# Patient Record
Sex: MALE | Race: BLACK OR AFRICAN AMERICAN | HISPANIC OR LATINO | Employment: FULL TIME | URBAN - METROPOLITAN AREA
[De-identification: names, ages, dates, MRNs, and addresses within clinical notes are randomized per-mention and may not be internally consistent; named-entity substitution may affect disease eponyms.]

---

## 2020-09-19 ENCOUNTER — HOSPITAL ENCOUNTER (EMERGENCY)
Facility: HOSPITAL | Age: 55
Discharge: HOME | End: 2020-09-19
Attending: EMERGENCY MEDICINE | Admitting: EMERGENCY MEDICINE
Payer: COMMERCIAL

## 2020-09-19 VITALS
WEIGHT: 214.1 LBS | DIASTOLIC BLOOD PRESSURE: 83 MMHG | SYSTOLIC BLOOD PRESSURE: 146 MMHG | RESPIRATION RATE: 18 BRPM | OXYGEN SATURATION: 99 % | TEMPERATURE: 97.5 F | HEART RATE: 89 BPM

## 2020-09-19 DIAGNOSIS — Z76.0 MEDICATION REFILL: Primary | ICD-10-CM

## 2020-09-19 PROCEDURE — 99281 EMR DPT VST MAYX REQ PHY/QHP: CPT

## 2020-09-19 PROCEDURE — 99281 EMR DPT VST MAYX REQ PHY/QHP: CPT | Performed by: EMERGENCY MEDICINE

## 2020-09-19 RX ORDER — ERGOCALCIFEROL (VITAMIN D2) 10 MCG
1 TABLET ORAL WEEKLY
Qty: 5 TABLET | Refills: 0 | Status: SHIPPED | OUTPATIENT
Start: 2020-09-19 | End: 2020-11-12 | Stop reason: SDUPTHER

## 2020-09-19 RX ORDER — LATANOPROST 50 UG/ML
1 SOLUTION/ DROPS OPHTHALMIC
Qty: 2.5 ML | Refills: 0 | Status: SHIPPED | OUTPATIENT
Start: 2020-09-19 | End: 2020-11-12 | Stop reason: SDUPTHER

## 2020-09-19 RX ORDER — LISINOPRIL 10 MG/1
10 TABLET ORAL DAILY
Qty: 20 TABLET | Refills: 0 | Status: SHIPPED | OUTPATIENT
Start: 2020-09-19 | End: 2020-10-19 | Stop reason: SDUPTHER

## 2020-09-19 NOTE — ED NOTES
Patient seen assessed and discharged before primary RN assessment      Kofi Rascon RN  09/19/20 2369

## 2020-09-19 NOTE — ED PROVIDER NOTES
History  Chief Complaint   Patient presents with    Medication Refill     needs refill on lisinopril 10mg; Vit D and lantanoprost     Recently moved from [facility]  No PCP  Working on getting insurance  Needs 3 medications refilled  No complaints at this time  History provided by:  Patient and spouse   used: Yes    Medication Refill       None       Past Medical History:   Diagnosis Date    Diabetes mellitus ([facility] [address] )     Glaucoma     Hypertension        Past Surgical History:   Procedure Laterality Date    CARPAL TUNNEL RELEASE      HERNIA REPAIR         History reviewed  No pertinent family history  I have reviewed and agree with the history as documented  E-Cigarette/Vaping    E-Cigarette Use Never User      E-Cigarette/Vaping Substances    Nicotine No     THC No     CBD No     Flavoring No     Other No     Unknown No      Social History     Tobacco Use    Smoking status: Never Smoker    Smokeless tobacco: Never Used   Substance Use Topics    Alcohol use: Never     Frequency: Never    Drug use: Never       Review of Systems   Constitutional: Negative  Negative for chills and fever  HENT: Negative  Negative for rhinorrhea, sore throat, trouble swallowing and voice change  Eyes: Negative  Negative for pain and visual disturbance  Respiratory: Negative  Negative for cough, shortness of breath and wheezing  Cardiovascular: Negative  Negative for chest pain and palpitations  Gastrointestinal: Negative for abdominal pain, diarrhea, nausea and vomiting  Genitourinary: Negative  Negative for dysuria and frequency  Musculoskeletal: Negative  Negative for neck pain and neck stiffness  Skin: Negative  Negative for rash  Neurological: Negative  Negative for dizziness, speech difficulty, weakness, light-headedness and numbness  Physical Exam  Physical Exam  Vitals signs and nursing note reviewed     Constitutional:       General: He is not in acute distress  Appearance: He is well-developed  HENT:      Head: Normocephalic and atraumatic  Eyes:      Conjunctiva/sclera: Conjunctivae normal       Pupils: Pupils are equal, round, and reactive to light  Neck:      Musculoskeletal: Normal range of motion and neck supple  Trachea: No tracheal deviation  Cardiovascular:      Rate and Rhythm: Normal rate and regular rhythm  Pulmonary:      Effort: Pulmonary effort is normal  No respiratory distress  Breath sounds: Normal breath sounds  No wheezing or rales  Abdominal:      General: Bowel sounds are normal  There is no distension  Palpations: Abdomen is soft  Tenderness: There is no abdominal tenderness  There is no guarding or rebound  Musculoskeletal: Normal range of motion  General: No tenderness or deformity  Skin:     General: Skin is warm and dry  Capillary Refill: Capillary refill takes less than 2 seconds  Findings: No rash  Neurological:      Mental Status: He is alert and oriented to person, place, and time  Psychiatric:         Behavior: Behavior normal          Vital Signs  ED Triage Vitals [09/19/20 1517]   Temperature Pulse Respirations Blood Pressure SpO2   97 5 °F (36 4 °C) 89 18 146/83 99 %      Temp Source Heart Rate Source Patient Position - Orthostatic VS BP Location FiO2 (%)   Tympanic Monitor Sitting Left arm --      Pain Score       --           Vitals:    09/19/20 1517   BP: 146/83   Pulse: 89   Patient Position - Orthostatic VS: Sitting         Visual Acuity      ED Medications  Medications - No data to display    Diagnostic Studies  Results Reviewed     None                 No orders to display              Procedures  Procedures         ED Course                                       MDM  Number of Diagnoses or Management Options  Medication refill:   Diagnosis management comments: Meds refilled, Vitals reviewed and okay   No complaints and no abnormalities noted on exam  Given PCP contact information  Strict return precautions discussed  Disposition  Final diagnoses:   Medication refill     Time reflects when diagnosis was documented in both MDM as applicable and the Disposition within this note     Time User Action Codes Description Comment    9/19/2020  3:21 PM Millicent Price Add [Z76 0] Medication refill       ED Disposition     ED Disposition Condition Date/Time Comment    Discharge Stable Sat Sep 19, 2020  3:21 PM Vince Buffalo discharge to home/self care  Follow-up Information     Follow up With Specialties Details Why Contact Info Additional [address] In 1 week  [address], [address] [facility] [id number]  [address], [address], [address] [place], [place], [address]          Patient's Medications   Discharge Prescriptions    ERGOCALCIFEROL (VITAMIN D2) 10 MCG (400 UNIT) TABS    Take 1 tablet (400 Units total) by mouth once a week       Start Date: 9/19/2020 End Date: --       Order Dose: 400 Units       Quantity: 5 tablet    Refills: 0    LATANOPROST (XALATAN) 0 005 % OPHTHALMIC SOLUTION    Administer 1 drop to both eyes daily at bedtime       Start Date: 9/19/2020 End Date: --       Order Dose: 1 drop       Quantity: 2 5 mL    Refills: 0    LISINOPRIL (ZESTRIL) 10 MG TABLET    Take 1 tablet (10 mg total) by mouth daily       Start Date: 9/19/2020 End Date: --       Order Dose: 10 mg       Quantity: 20 tablet    Refills: 0     No discharge procedures on file      PDMP Review     None          ED Provider  Electronically Signed by           Hector Thibodeaux DO  09/19/20 4913

## 2020-10-19 ENCOUNTER — HOSPITAL ENCOUNTER (EMERGENCY)
Facility: HOSPITAL | Age: 55
Discharge: HOME | End: 2020-10-19
Attending: EMERGENCY MEDICINE | Admitting: EMERGENCY MEDICINE
Payer: COMMERCIAL

## 2020-10-19 VITALS
OXYGEN SATURATION: 98 % | TEMPERATURE: 98.5 F | SYSTOLIC BLOOD PRESSURE: 160 MMHG | HEART RATE: 84 BPM | RESPIRATION RATE: 18 BRPM | DIASTOLIC BLOOD PRESSURE: 85 MMHG | WEIGHT: 217.59 LBS

## 2020-10-19 DIAGNOSIS — Z76.0 MEDICATION REFILL: Primary | ICD-10-CM

## 2020-10-19 LAB — GLUCOSE SERPL-MCNC: 198 MG/DL (ref 65–140)

## 2020-10-19 PROCEDURE — 82948 REAGENT STRIP/BLOOD GLUCOSE: CPT

## 2020-10-19 PROCEDURE — 99283 EMERGENCY DEPT VISIT LOW MDM: CPT | Performed by: EMERGENCY MEDICINE

## 2020-10-19 PROCEDURE — 99282 EMERGENCY DEPT VISIT SF MDM: CPT

## 2020-10-19 RX ORDER — LISINOPRIL 10 MG/1
10 TABLET ORAL DAILY
Qty: 30 TABLET | Refills: 1 | Status: SHIPPED | OUTPATIENT
Start: 2020-10-19 | End: 2020-11-12 | Stop reason: SDUPTHER

## 2020-10-26 ENCOUNTER — APPOINTMENT (OUTPATIENT)
Dept: LAB | Facility: CLINIC | Age: 55
End: 2020-10-26
Payer: COMMERCIAL

## 2020-10-26 ENCOUNTER — OFFICE VISIT (OUTPATIENT)
Dept: FAMILY MEDICINE CLINIC | Facility: CLINIC | Age: 55
End: 2020-10-26

## 2020-10-26 VITALS
OXYGEN SATURATION: 97 % | WEIGHT: 217 LBS | TEMPERATURE: 98 F | RESPIRATION RATE: 16 BRPM | DIASTOLIC BLOOD PRESSURE: 82 MMHG | SYSTOLIC BLOOD PRESSURE: 130 MMHG | BODY MASS INDEX: 34.87 KG/M2 | HEIGHT: 66 IN | HEART RATE: 88 BPM

## 2020-10-26 DIAGNOSIS — Z76.89 ESTABLISHING CARE WITH NEW DOCTOR, ENCOUNTER FOR: ICD-10-CM

## 2020-10-26 DIAGNOSIS — Z76.89 ESTABLISHING CARE WITH NEW DOCTOR, ENCOUNTER FOR: Primary | ICD-10-CM

## 2020-10-26 DIAGNOSIS — Z11.4 ENCOUNTER FOR SCREENING FOR HIV: ICD-10-CM

## 2020-10-26 DIAGNOSIS — E11.9 TYPE 2 DIABETES MELLITUS WITHOUT COMPLICATION, WITHOUT LONG-TERM CURRENT USE OF INSULIN (HCC): ICD-10-CM

## 2020-10-26 DIAGNOSIS — Z23 NEED FOR VACCINATION: ICD-10-CM

## 2020-10-26 PROBLEM — R53.83 OTHER FATIGUE: Status: ACTIVE | Noted: 2020-10-26

## 2020-10-26 PROBLEM — E78.49 OTHER HYPERLIPIDEMIA: Status: ACTIVE | Noted: 2020-10-26

## 2020-10-26 LAB
ALBUMIN SERPL BCP-MCNC: 4 G/DL (ref 3.5–5)
ALP SERPL-CCNC: 141 U/L (ref 46–116)
ALT SERPL W P-5'-P-CCNC: 69 U/L (ref 12–78)
ANION GAP SERPL CALCULATED.3IONS-SCNC: 3 MMOL/L (ref 4–13)
AST SERPL W P-5'-P-CCNC: 31 U/L (ref 5–45)
BASOPHILS # BLD AUTO: 0.04 THOUSANDS/ΜL (ref 0–0.1)
BASOPHILS NFR BLD AUTO: 1 % (ref 0–1)
BILIRUB SERPL-MCNC: 0.42 MG/DL (ref 0.2–1)
BUN SERPL-MCNC: 8 MG/DL (ref 5–25)
CALCIUM SERPL-MCNC: 9.3 MG/DL (ref 8.3–10.1)
CHLORIDE SERPL-SCNC: 104 MMOL/L (ref 100–108)
CHOLEST SERPL-MCNC: 175 MG/DL (ref 50–200)
CO2 SERPL-SCNC: 31 MMOL/L (ref 21–32)
CREAT SERPL-MCNC: 0.89 MG/DL (ref 0.6–1.3)
CREAT UR-MCNC: 387 MG/DL
EOSINOPHIL # BLD AUTO: 0.17 THOUSAND/ΜL (ref 0–0.61)
EOSINOPHIL NFR BLD AUTO: 3 % (ref 0–6)
ERYTHROCYTE [DISTWIDTH] IN BLOOD BY AUTOMATED COUNT: 13.3 % (ref 11.6–15.1)
GFR SERPL CREATININE-BSD FRML MDRD: 98 ML/MIN/1.73SQ M
GLUCOSE P FAST SERPL-MCNC: 147 MG/DL (ref 65–99)
HCT VFR BLD AUTO: 44.4 % (ref 36.5–49.3)
HDLC SERPL-MCNC: 53 MG/DL
HGB BLD-MCNC: 15.4 G/DL (ref 12–17)
IMM GRANULOCYTES # BLD AUTO: 0.03 THOUSAND/UL (ref 0–0.2)
IMM GRANULOCYTES NFR BLD AUTO: 1 % (ref 0–2)
LDLC SERPL CALC-MCNC: 99 MG/DL (ref 0–100)
LYMPHOCYTES # BLD AUTO: 1.52 THOUSANDS/ΜL (ref 0.6–4.47)
LYMPHOCYTES NFR BLD AUTO: 27 % (ref 14–44)
MCH RBC QN AUTO: 30 PG (ref 26.8–34.3)
MCHC RBC AUTO-ENTMCNC: 34.7 G/DL (ref 31.4–37.4)
MCV RBC AUTO: 87 FL (ref 82–98)
MICROALBUMIN UR-MCNC: 47 MG/L (ref 0–20)
MICROALBUMIN/CREAT 24H UR: 12 MG/G CREATININE (ref 0–30)
MONOCYTES # BLD AUTO: 0.49 THOUSAND/ΜL (ref 0.17–1.22)
MONOCYTES NFR BLD AUTO: 9 % (ref 4–12)
NEUTROPHILS # BLD AUTO: 3.42 THOUSANDS/ΜL (ref 1.85–7.62)
NEUTS SEG NFR BLD AUTO: 59 % (ref 43–75)
NRBC BLD AUTO-RTO: 0 /100 WBCS
PLATELET # BLD AUTO: 137 THOUSANDS/UL (ref 149–390)
PMV BLD AUTO: 12.6 FL (ref 8.9–12.7)
POTASSIUM SERPL-SCNC: 3.8 MMOL/L (ref 3.5–5.3)
PROT SERPL-MCNC: 7.8 G/DL (ref 6.4–8.2)
RBC # BLD AUTO: 5.13 MILLION/UL (ref 3.88–5.62)
SL AMB POCT HEMOGLOBIN AIC: 7.4 (ref ?–6.5)
SODIUM SERPL-SCNC: 138 MMOL/L (ref 136–145)
TRIGL SERPL-MCNC: 115 MG/DL
TSH SERPL DL<=0.05 MIU/L-ACNC: 3.13 UIU/ML (ref 0.36–3.74)
WBC # BLD AUTO: 5.67 THOUSAND/UL (ref 4.31–10.16)

## 2020-10-26 PROCEDURE — 90682 RIV4 VACC RECOMBINANT DNA IM: CPT

## 2020-10-26 PROCEDURE — 90471 IMMUNIZATION ADMIN: CPT

## 2020-10-26 PROCEDURE — 3051F HG A1C>EQUAL 7.0%<8.0%: CPT | Performed by: FAMILY MEDICINE

## 2020-10-26 PROCEDURE — 82043 UR ALBUMIN QUANTITATIVE: CPT | Performed by: FAMILY MEDICINE

## 2020-10-26 PROCEDURE — 99204 OFFICE O/P NEW MOD 45 MIN: CPT | Performed by: FAMILY MEDICINE

## 2020-10-26 PROCEDURE — 3061F NEG MICROALBUMINURIA REV: CPT | Performed by: FAMILY MEDICINE

## 2020-10-26 PROCEDURE — 36415 COLL VENOUS BLD VENIPUNCTURE: CPT

## 2020-10-26 PROCEDURE — 85025 COMPLETE CBC W/AUTO DIFF WBC: CPT

## 2020-10-26 PROCEDURE — 87389 HIV-1 AG W/HIV-1&-2 AB AG IA: CPT

## 2020-10-26 PROCEDURE — 3725F SCREEN DEPRESSION PERFORMED: CPT | Performed by: FAMILY MEDICINE

## 2020-10-26 PROCEDURE — 83036 HEMOGLOBIN GLYCOSYLATED A1C: CPT | Performed by: FAMILY MEDICINE

## 2020-10-26 PROCEDURE — 90715 TDAP VACCINE 7 YRS/> IM: CPT

## 2020-10-26 PROCEDURE — 3008F BODY MASS INDEX DOCD: CPT | Performed by: FAMILY MEDICINE

## 2020-10-26 PROCEDURE — 80061 LIPID PANEL: CPT

## 2020-10-26 PROCEDURE — 80053 COMPREHEN METABOLIC PANEL: CPT

## 2020-10-26 PROCEDURE — 82570 ASSAY OF URINE CREATININE: CPT | Performed by: FAMILY MEDICINE

## 2020-10-26 PROCEDURE — 84443 ASSAY THYROID STIM HORMONE: CPT

## 2020-10-26 PROCEDURE — 90472 IMMUNIZATION ADMIN EACH ADD: CPT

## 2020-10-27 LAB — HIV 1+2 AB+HIV1 P24 AG SERPL QL IA: NORMAL

## 2020-11-09 ENCOUNTER — TELEPHONE (OUTPATIENT)
Dept: FAMILY MEDICINE CLINIC | Facility: CLINIC | Age: 55
End: 2020-11-09

## 2020-11-11 DIAGNOSIS — Z76.0 MEDICATION REFILL: ICD-10-CM

## 2020-11-12 ENCOUNTER — TELEPHONE (OUTPATIENT)
Dept: FAMILY MEDICINE CLINIC | Facility: CLINIC | Age: 55
End: 2020-11-12

## 2020-11-12 RX ORDER — LATANOPROST 50 UG/ML
1 SOLUTION/ DROPS OPHTHALMIC
Qty: 2.5 ML | Refills: 0 | Status: SHIPPED | OUTPATIENT
Start: 2020-11-12 | End: 2020-11-16 | Stop reason: SDUPTHER

## 2020-11-12 RX ORDER — ERGOCALCIFEROL (VITAMIN D2) 10 MCG
1 TABLET ORAL WEEKLY
Qty: 5 TABLET | Refills: 0 | Status: SHIPPED | OUTPATIENT
Start: 2020-11-12 | End: 2020-11-16 | Stop reason: SDUPTHER

## 2020-11-12 RX ORDER — LISINOPRIL 10 MG/1
10 TABLET ORAL DAILY
Qty: 30 TABLET | Refills: 1 | Status: SHIPPED | OUTPATIENT
Start: 2020-11-12 | End: 2020-11-16 | Stop reason: SDUPTHER

## 2020-11-16 ENCOUNTER — TELEPHONE (OUTPATIENT)
Dept: FAMILY MEDICINE CLINIC | Facility: CLINIC | Age: 55
End: 2020-11-16

## 2020-11-16 DIAGNOSIS — Z76.0 MEDICATION REFILL: ICD-10-CM

## 2020-11-16 PROCEDURE — 4010F ACE/ARB THERAPY RXD/TAKEN: CPT | Performed by: FAMILY MEDICINE

## 2020-11-16 RX ORDER — ERGOCALCIFEROL (VITAMIN D2) 10 MCG
1 TABLET ORAL WEEKLY
Qty: 5 TABLET | Refills: 0 | Status: SHIPPED | OUTPATIENT
Start: 2020-11-16 | End: 2021-06-04 | Stop reason: ALTCHOICE

## 2020-11-16 RX ORDER — LISINOPRIL 10 MG/1
10 TABLET ORAL DAILY
Qty: 30 TABLET | Refills: 1 | Status: SHIPPED | OUTPATIENT
Start: 2020-11-16 | End: 2021-01-15 | Stop reason: SDUPTHER

## 2020-11-16 RX ORDER — LATANOPROST 50 UG/ML
1 SOLUTION/ DROPS OPHTHALMIC
Qty: 2.5 ML | Refills: 0 | Status: SHIPPED | OUTPATIENT
Start: 2020-11-16 | End: 2020-12-10 | Stop reason: SDUPTHER

## 2020-12-10 DIAGNOSIS — Z76.0 MEDICATION REFILL: ICD-10-CM

## 2020-12-10 RX ORDER — LATANOPROST 50 UG/ML
1 SOLUTION/ DROPS OPHTHALMIC
Qty: 2.5 ML | Refills: 0 | Status: CANCELLED | OUTPATIENT
Start: 2020-12-10

## 2020-12-10 RX ORDER — LATANOPROST 50 UG/ML
1 SOLUTION/ DROPS OPHTHALMIC
Qty: 2.5 ML | Refills: 0 | Status: SHIPPED | OUTPATIENT
Start: 2020-12-10 | End: 2021-01-15 | Stop reason: SDUPTHER

## 2020-12-15 ENCOUNTER — HOSPITAL ENCOUNTER (EMERGENCY)
Facility: HOSPITAL | Age: 55
Discharge: HOME | End: 2020-12-15
Attending: EMERGENCY MEDICINE
Payer: COMMERCIAL

## 2020-12-15 VITALS
WEIGHT: 215 LBS | RESPIRATION RATE: 18 BRPM | HEART RATE: 79 BPM | SYSTOLIC BLOOD PRESSURE: 155 MMHG | BODY MASS INDEX: 34.7 KG/M2 | TEMPERATURE: 98.5 F | OXYGEN SATURATION: 100 % | DIASTOLIC BLOOD PRESSURE: 77 MMHG

## 2020-12-15 DIAGNOSIS — Z20.822 SUSPECTED COVID-19 VIRUS INFECTION: Primary | ICD-10-CM

## 2020-12-15 PROCEDURE — 99283 EMERGENCY DEPT VISIT LOW MDM: CPT

## 2020-12-15 PROCEDURE — 99284 EMERGENCY DEPT VISIT MOD MDM: CPT | Performed by: PHYSICIAN ASSISTANT

## 2020-12-15 PROCEDURE — 87637 SARSCOV2&INF A&B&RSV AMP PRB: CPT | Performed by: PHYSICIAN ASSISTANT

## 2020-12-15 RX ORDER — BENZONATATE 100 MG/1
100 CAPSULE ORAL EVERY 8 HOURS
Qty: 21 CAPSULE | Refills: 0 | Status: SHIPPED | OUTPATIENT
Start: 2020-12-15 | End: 2021-01-15 | Stop reason: ALTCHOICE

## 2020-12-15 RX ORDER — ALBUTEROL SULFATE 90 UG/1
1-2 AEROSOL, METERED RESPIRATORY (INHALATION) EVERY 6 HOURS PRN
Qty: 1 INHALER | Refills: 0 | Status: SHIPPED | OUTPATIENT
Start: 2020-12-15 | End: 2021-06-04 | Stop reason: ALTCHOICE

## 2020-12-15 RX ORDER — MULTIVIT WITH MINERALS/LUTEIN
1000 TABLET ORAL DAILY
Qty: 14 TABLET | Refills: 0 | Status: SHIPPED | OUTPATIENT
Start: 2020-12-15 | End: 2021-06-04 | Stop reason: ALTCHOICE

## 2020-12-15 RX ORDER — MULTIVITAMIN
1 TABLET ORAL DAILY
Qty: 14 TABLET | Refills: 0 | Status: SHIPPED | OUTPATIENT
Start: 2020-12-15 | End: 2021-01-15 | Stop reason: ALTCHOICE

## 2020-12-15 RX ORDER — ACETAMINOPHEN 500 MG
1000 TABLET ORAL EVERY 6 HOURS PRN
Qty: 30 TABLET | Refills: 0 | Status: SHIPPED | OUTPATIENT
Start: 2020-12-15 | End: 2021-01-15 | Stop reason: ALTCHOICE

## 2020-12-15 RX ORDER — MELATONIN
1000 DAILY
Qty: 14 TABLET | Refills: 0 | Status: SHIPPED | OUTPATIENT
Start: 2020-12-15 | End: 2021-09-09 | Stop reason: SDUPTHER

## 2020-12-18 LAB
FLUAV RNA NPH QL NAA+PROBE: NOT DETECTED
FLUBV RNA NPH QL NAA+PROBE: NOT DETECTED
RSV RNA NPH QL NAA+PROBE: NOT DETECTED
SARS-COV-2 RNA NPH QL NAA+PROBE: DETECTED

## 2021-01-06 ENCOUNTER — TELEMEDICINE (OUTPATIENT)
Dept: FAMILY MEDICINE CLINIC | Facility: CLINIC | Age: 56
End: 2021-01-06

## 2021-01-06 DIAGNOSIS — B34.9 VIRAL INFECTION, UNSPECIFIED: ICD-10-CM

## 2021-01-06 DIAGNOSIS — Z20.822 EXPOSURE TO COVID-19 VIRUS: ICD-10-CM

## 2021-01-06 DIAGNOSIS — U07.1 2019 NOVEL CORONAVIRUS DISEASE (COVID-19): Primary | ICD-10-CM

## 2021-01-06 PROCEDURE — 99213 OFFICE O/P EST LOW 20 MIN: CPT | Performed by: PHYSICIAN ASSISTANT

## 2021-01-06 NOTE — LETTER
January 6, 2021     Patient: Fannie Gomez   YOB: 1965   Date of Visit: 1/6/2021       To Whom it May Concern:    Fannie Gomez is under my professional care  He was seen in my office virtually on 1/6/2021  Patient has tested positive for COVID-19  Patient may not return to work until cleared by PCP  If you have any questions or concerns, please don't hesitate to call           Sincerely,          Caitlyn Martinez PA-C        CC: No Recipients

## 2021-01-06 NOTE — PROGRESS NOTES
COVID-19 Virtual Visit     Assessment/Plan:    Problem List Items Addressed This Visit     None      Visit Diagnoses     2019 novel coronavirus disease (COVID-19)    -  Primary    Exposure to COVID-19 virus        Relevant Orders    Novel Coronavirus (COVID-19), PCR [facility] - Collected at [facility] or [facility]    Viral infection, unspecified        Relevant Orders    Novel Coronavirus (COVID-19), PCR [facility] - Collected at [place] [id number] or [facility]         Disposition:     I recommended continued isolation until at least 24 hours have passed since recovery defined as resolution of fever without the use of fever-reducing medications and improvement in respiratory symptoms (e g , cough, shortness of breath) AND 10 days have passed since onset of symptoms      - Patient originally tested positive for COVID-19 on 12/15/2020  Patient completed another test on 01/02/2021 which was still positive  Patient requires negative COVID-19 test in order to return to work  Advised patient to wait at least one more week before completing another COVID-19 test   - Advised patient to continue to follow good infection control measures including frequent hand washing, wearing a mask when around others, and to practice social distancing     - Advised patient to call the office or report to ED if symptoms persist, worsen, or new symptoms arise such as worsening shortness of breath or difficulty breathing  I have spent 13 minutes directly with the patient  Greater than 50% of this time was spent in counseling/coordination of care regarding: patient and family education and risk factor reductions  Encounter provider Leticia Cardona PA-C    Provider located at [address] [place] [phone] [phone]    Recent Visits  No visits were found meeting these conditions     Showing recent visits within past 7 days and meeting all other requirements     Today's Visits  Date Type Provider Dept   01/06/21 Telemedicine [facility]   Showing today's visits and meeting all other requirements     Future Appointments  No visits were found meeting these conditions  Showing future appointments within next 150 days and meeting all other requirements        Patient agrees to participate in a virtual check in via telephone or video visit instead of presenting to the office to address urgent/immediate medical needs  Patient is aware this is a billable service  After connecting through Telephone, the patient was identified by name and date of birth  Kristen Pollard was informed that this was a telemedicine visit and that the exam was being conducted confidentially over secure lines  My office door was closed  No one else was in the room  Kristen Pollard acknowledged consent and understanding of privacy and security of the telemedicine visit  I informed the patient that I have reviewed his record in Epic and presented the opportunity for him to ask any questions regarding the visit today  The patient agreed to participate  It was my intent to perform this visit via video technology but the patient was not able to do a video connection so the visit was completed via audio telephone only  Subjective:   Kristen Pollard is a 50 y o  male who has been screened for COVID-19  Symptom change since last report: resolving  Patient's symptoms include cough (slight)  Patient denies fever, chills, fatigue, sore throat, anosmia, loss of taste, shortness of breath, abdominal pain, nausea, vomiting, diarrhea and headaches  [facility] has been staying home and has isolated themselves in his home  He is taking care to not share personal items and is cleaning all surfaces that are touched often, like counters, tabletops, and doorknobs using household cleaning sprays or wipes  He is wearing a mask when he leaves his room       Patient notes he originally tested positive for COVID-19 on 12/15/2020 in the emergency room  Patient notes his wife also tested positive on this date  Patient notes he then was in quarantine at his house until 01/02/2021  Patient notes him and his wife both had another COVID-19 test completed at [facility] on this date  Patient notes his wife tested negative, but he tested positive  Patient notes overall he feels fine but does have a mild cough  Patient notes he is unable to return to work until he has a negative test result      Lab Results   Component Value Date    SARSCOV2 Detected (A) 12/15/2020     Past Medical History:   Diagnosis Date    Diabetes mellitus ([facility] [address] )     Glaucoma     Hypertension      Past Surgical History:   Procedure Laterality Date    CARPAL TUNNEL RELEASE      HERNIA REPAIR       Current Outpatient Medications   Medication Sig Dispense Refill    acetaminophen (TYLENOL) 500 mg tablet Take 2 tablets (1,000 mg total) by mouth every 6 (six) hours as needed for fever 30 tablet 0    albuterol (PROVENTIL HFA,VENTOLIN HFA) 90 mcg/act inhaler Inhale 1-2 puffs every 6 (six) hours as needed for wheezing 1 Inhaler 0    Ascorbic Acid (vitamin C) 1000 MG tablet Take 1 tablet (1,000 mg total) by mouth daily 14 tablet 0    benzonatate (TESSALON PERLES) 100 mg capsule Take 1 capsule (100 mg total) by mouth every 8 (eight) hours 21 capsule 0    cholecalciferol (VITAMIN D3) 1,000 units tablet Take 1 tablet (1,000 Units total) by mouth daily 14 tablet 0    Ergocalciferol (Vitamin D2) 10 MCG (400 UNIT) TABS Take 1 tablet (400 Units total) by mouth once a week 5 tablet 0    guaiFENesin (ROBITUSSIN) 100 MG/5ML oral liquid Take 5-10 mL (100-200 mg total) by mouth every 4 (four) hours as needed for cough 60 mL 0    latanoprost (XALATAN) 0 005 % ophthalmic solution Administer 1 drop to both eyes daily at bedtime 2 5 mL 0    lisinopril (ZESTRIL) 10 mg tablet Take 1 tablet (10 mg total) by mouth daily 30 tablet 1    Multiple Vitamin (multivitamin) tablet Take 1 tablet by mouth daily 14 tablet 0     No current facility-administered medications for this visit  No Known Allergies    Review of Systems   Constitutional: Negative for chills, fatigue and fever  HENT: Negative for sore throat  Respiratory: Positive for cough (slight)  Negative for shortness of breath  Gastrointestinal: Negative for abdominal pain, diarrhea, nausea and vomiting  Neurological: Negative for headaches  Objective: There were no vitals filed for this visit  Physical Exam  Constitutional:       General: He is not in acute distress  Pulmonary:      Effort: Pulmonary effort is normal  No respiratory distress  Neurological:      Mental Status: He is alert and oriented to person, place, and time  Psychiatric:         Speech: Speech normal           - Utilized [employer] phones for translation  VIRTUAL VISIT DISCLAIMER    Ramonita Mccoy acknowledges that he has consented to an online visit or consultation  He understands that the online visit is based solely on information provided by him, and that, in the absence of a face-to-face physical evaluation by the physician, the diagnosis he receives is both limited and provisional in terms of accuracy and completeness  This is not intended to replace a full medical face-to-face evaluation by the physician  Rmaonita Mccoy understands and accepts these terms

## 2021-01-14 NOTE — PROGRESS NOTES
Assessment/Plan:    Type 2 diabetes mellitus without complication, without long-term current use of insulin ([facility])  A  Lab Results   Component Value Date    HGBA1C 9 3 (A) 01/15/2021     HbA1c goal of 7  Currently A1c trending up from 7 3-->9  3   Will start patient on Metformin 500 mg x2 weeks, and then increase dose to 500 mg BID   BG supplies ordered for patient to check fasting BG daily   Advised to brings BG logs to next visit   Not on anti-lipid medication, start Lipitor 10 mg   Microalbuminuria most likely 2/2 to uncontrolled DMT2, continue with Lisinopril 20 mg   Adhere to diabetic diet and exercise  Will consider diabetic education if his BG are not controlled at the next visit   Referral to podiatry initiated  Annual ophtalmology and podiatry visits encouraged     BMI 35 0-35 9,adult  BMI Counseling: Body mass index is 34 86 kg/m²  The BMI is above normal  Nutrition recommendations include reducing portion sizes, decreasing overall calorie intake, 3-5 servings of fruits/vegetables daily, reducing fast food intake, consuming healthier snacks, decreasing soda and/or juice intake, moderation in carbohydrate intake, increasing intake of lean protein and reducing intake of saturated fat and trans fat  Exercise recommendations include exercising 3-5 times per week        Other hyperlipidemia  Lipid panel reviewed   ASCVD risk 7 6%  Will start patient on Lipitor 10 mg HS  Adhere to low fat diet     Encounter for screening colonoscopy  Discussed the importance of undergoing screening colonoscopy   Patient agreeable, will send referral to general surgery for consult    Erectile dysfunction  Chronic, ongoing x 15 yrs   Ambulatory referral to urology for further eval and management     Need for vaccination  PPSV-23 given today in the office        Diagnoses and all orders for this visit:    Type 2 diabetes mellitus without complication, without long-term current use of insulin ([facility] [address] )  -     POCT hemoglobin A1c  - metFORMIN (GLUCOPHAGE) 500 mg tablet; Take 1 tablet (500 mg) by mouth once a day for the first 2 weeks  Then take 1 tablet (500 mg) by mouth 2 times a day with meals  -     Ambulatory referral to Podiatry; Future  -     glucose blood (OneTouch Verio) test strip; Use as instructed  -     OneTouch Delica Lancets 12B MISC; Use every morning  -     Blood Glucose Monitoring Suppl (OneTouch Verio) w/Device KIT; Use daily    Other hyperlipidemia  -     atorvastatin (LIPITOR) 10 mg tablet; Take 1 tablet (10 mg total) by mouth daily    Medication refill  -     lisinopril (ZESTRIL) 10 mg tablet; Take 1 tablet (10 mg total) by mouth daily  -     Discontinue: latanoprost (XALATAN) 0 005 % ophthalmic solution; Administer 1 drop to both eyes daily at bedtime  -     latanoprost (XALATAN) 0 005 % ophthalmic solution; Administer 1 drop to both eyes daily at bedtime    Erectile dysfunction, unspecified erectile dysfunction type  -     Ambulatory referral to Urology; Future    Encounter for screening colonoscopy  -     Ambulatory referral to General Surgery; Future    Need for vaccination  -     PNEUMOCOCCAL POLYSACCHARIDE VACCINE 23-VALENT =>1YO SQ IM    BMI 35 0-35 9,adult    Other orders  -     Cancel: Ambulatory referral to Gastroenterology; Future          Subjective:      Patient ID: Joselo Koehler is a 50 y o  male who presents today for chronic conditions f/u visit  HPI     Geneva Franklin reports he had diabetes for 20 yrs  Mentions he was d/c off Metformin 3 yrs ago due to normalization of labs  States he doesn't check BG at home since that time  He is currently not taking any medications  Mentions he would like to review his labs as he's not aware of recent results  He hasn't received metformin prescription at the pharmacy       Currently, patient denies microvascular sx (blurry vision, visual disturbances, polyuria, tingling, numbness, pain) and macrovascular sx (chest pain, palpitation, excertional and rest shortness of breath, lower ext  swelling)  Erectile dysfunction  Trouble to have erections for 15 yrs  Was investigated in the past with labs and mentions he had PSA and testosterone about 1 yr ago wnl  He would like to see a specialist for further eval      Colonoscopy: not done   Ophthalmologic exam: 8 month ago  The following portions of the patient's history were reviewed and updated as appropriate: allergies, current medications, past family history, past medical history, past social history, past surgical history and problem list     Review of Systems   Constitutional: Negative for chills and fever  HENT: Negative for sore throat  Eyes: Negative for visual disturbance  Respiratory: Negative  Negative for cough and shortness of breath  Cardiovascular: Negative for chest pain and leg swelling  Gastrointestinal: Negative  Negative for abdominal pain, blood in stool, constipation, diarrhea, nausea and vomiting  Genitourinary: Negative  Negative for dysuria and hematuria  Skin: Negative for rash  Neurological: Negative for dizziness and headaches  Psychiatric/Behavioral: The patient is not nervous/anxious  Objective:      /86 (BP Location: Left arm, Patient Position: Sitting, Cuff Size: Adult)   Pulse 98   Temp 97 6 °F (36 4 °C) (Temporal)   Resp 15   Wt 98 kg (216 lb)   SpO2 98%   BMI 34 86 kg/m²          Physical Exam  Vitals signs and nursing note reviewed  Constitutional:       General: He is not in acute distress  Appearance: Normal appearance  He is well-developed  He is obese  He is not ill-appearing, toxic-appearing or diaphoretic  HENT:      Head: Normocephalic and atraumatic  Nose: Nose normal    Eyes:      Extraocular Movements: Extraocular movements intact  Conjunctiva/sclera: Conjunctivae normal       Pupils: Pupils are equal, round, and reactive to light  Neck:      Musculoskeletal: Normal range of motion and neck supple     Cardiovascular: Rate and Rhythm: Normal rate and regular rhythm  Pulses:           Dorsalis pedis pulses are 1+ on the right side and 1+ on the left side  Posterior tibial pulses are 1+ on the right side and 1+ on the left side  Heart sounds: Normal heart sounds  Pulmonary:      Effort: Pulmonary effort is normal  No respiratory distress  Breath sounds: Normal breath sounds  Abdominal:      General: Bowel sounds are normal       Palpations: Abdomen is soft  Tenderness: There is no abdominal tenderness  Musculoskeletal: Normal range of motion  General: No tenderness  Right lower leg: No edema  Left lower leg: No edema  Feet:      Right foot:      Skin integrity: No ulcer, erythema or warmth  Left foot:      Skin integrity: No ulcer, erythema or warmth  Skin:     General: Skin is warm  Neurological:      Mental Status: He is alert and oriented to person, place, and time  Psychiatric:         Mood and Affect: Mood normal          Behavior: Behavior normal          Thought Content: Thought content normal          Judgment: Judgment normal        Patient's shoes and socks removed  Right Foot/Ankle   Right Foot Inspection  Skin Exam: skin intact and abnormal color (discolored nail beds ) no warmth, no erythema and no ulcer                            Sensory   Vibration: diminished    Monofilament testing: intact  Vascular    The right DP pulse is 1+  The right PT pulse is 1+  Left Foot/Ankle  Left Foot Inspection  Skin Exam: skin intact and abnormal color (discolored nail beds)no warmth, no erythema and no ulcer                                         Sensory   Vibration: diminished    Monofilament: intact  Vascular    The left DP pulse is 1+  The left PT pulse is 1+  Assign Risk Category:  No deformity present;  No loss of protective sensation;        Risk: 0

## 2021-01-15 ENCOUNTER — OFFICE VISIT (OUTPATIENT)
Dept: FAMILY MEDICINE CLINIC | Facility: CLINIC | Age: 56
End: 2021-01-15

## 2021-01-15 VITALS
BODY MASS INDEX: 34.86 KG/M2 | HEART RATE: 98 BPM | RESPIRATION RATE: 15 BRPM | WEIGHT: 216 LBS | DIASTOLIC BLOOD PRESSURE: 86 MMHG | SYSTOLIC BLOOD PRESSURE: 134 MMHG | TEMPERATURE: 97.6 F | OXYGEN SATURATION: 98 %

## 2021-01-15 DIAGNOSIS — Z12.11 ENCOUNTER FOR SCREENING COLONOSCOPY: ICD-10-CM

## 2021-01-15 DIAGNOSIS — E11.9 TYPE 2 DIABETES MELLITUS WITHOUT COMPLICATION, WITHOUT LONG-TERM CURRENT USE OF INSULIN (HCC): Primary | ICD-10-CM

## 2021-01-15 DIAGNOSIS — N52.9 ERECTILE DYSFUNCTION, UNSPECIFIED ERECTILE DYSFUNCTION TYPE: ICD-10-CM

## 2021-01-15 DIAGNOSIS — Z76.0 MEDICATION REFILL: ICD-10-CM

## 2021-01-15 DIAGNOSIS — Z23 NEED FOR VACCINATION: ICD-10-CM

## 2021-01-15 DIAGNOSIS — E78.49 OTHER HYPERLIPIDEMIA: ICD-10-CM

## 2021-01-15 LAB — SL AMB POCT HEMOGLOBIN AIC: 9.3 (ref ?–6.5)

## 2021-01-15 PROCEDURE — 90732 PPSV23 VACC 2 YRS+ SUBQ/IM: CPT

## 2021-01-15 PROCEDURE — 3046F HEMOGLOBIN A1C LEVEL >9.0%: CPT | Performed by: FAMILY MEDICINE

## 2021-01-15 PROCEDURE — 90471 IMMUNIZATION ADMIN: CPT

## 2021-01-15 PROCEDURE — 4010F ACE/ARB THERAPY RXD/TAKEN: CPT | Performed by: FAMILY MEDICINE

## 2021-01-15 PROCEDURE — 83036 HEMOGLOBIN GLYCOSYLATED A1C: CPT | Performed by: FAMILY MEDICINE

## 2021-01-15 PROCEDURE — 99213 OFFICE O/P EST LOW 20 MIN: CPT | Performed by: FAMILY MEDICINE

## 2021-01-15 RX ORDER — BLOOD SUGAR DIAGNOSTIC
STRIP MISCELLANEOUS
Qty: 100 EACH | Refills: 0 | Status: SHIPPED | OUTPATIENT
Start: 2021-01-15 | End: 2021-05-04

## 2021-01-15 RX ORDER — BLOOD-GLUCOSE METER
EACH MISCELLANEOUS DAILY
Qty: 1 KIT | Refills: 0 | Status: SHIPPED | OUTPATIENT
Start: 2021-01-15

## 2021-01-15 RX ORDER — LANCETS 33 GAUGE
EACH MISCELLANEOUS EVERY MORNING
Qty: 100 EACH | Refills: 0 | Status: SHIPPED | OUTPATIENT
Start: 2021-01-15 | End: 2021-02-13

## 2021-01-15 RX ORDER — ATORVASTATIN CALCIUM 10 MG/1
10 TABLET, FILM COATED ORAL DAILY
Qty: 30 TABLET | Refills: 3 | Status: SHIPPED | OUTPATIENT
Start: 2021-01-15 | End: 2021-02-12 | Stop reason: SDUPTHER

## 2021-01-15 RX ORDER — LATANOPROST 50 UG/ML
1 SOLUTION/ DROPS OPHTHALMIC
Qty: 2.5 ML | Refills: 0 | Status: SHIPPED | OUTPATIENT
Start: 2021-01-15 | End: 2021-01-15

## 2021-01-15 RX ORDER — LISINOPRIL 10 MG/1
10 TABLET ORAL DAILY
Qty: 30 TABLET | Refills: 3 | Status: SHIPPED | OUTPATIENT
Start: 2021-01-15 | End: 2021-02-12 | Stop reason: SDUPTHER

## 2021-01-15 RX ORDER — LATANOPROST 50 UG/ML
1 SOLUTION/ DROPS OPHTHALMIC
Qty: 2.5 ML | Refills: 3 | Status: SHIPPED | OUTPATIENT
Start: 2021-01-15 | End: 2021-07-08

## 2021-01-15 NOTE — ASSESSMENT & PLAN NOTE
BMI Counseling: Body mass index is 34 86 kg/m²  The BMI is above normal  Nutrition recommendations include reducing portion sizes, decreasing overall calorie intake, 3-5 servings of fruits/vegetables daily, reducing fast food intake, consuming healthier snacks, decreasing soda and/or juice intake, moderation in carbohydrate intake, increasing intake of lean protein and reducing intake of saturated fat and trans fat  Exercise recommendations include exercising 3-5 times per week

## 2021-01-15 NOTE — PATIENT INSTRUCTIONS
Conteo básico de carbohidratos   CUIDADO AMBULATORIO:   El conteo de carbohidratos es [place] de planificar eric comidas contando la cantidad de carbohidratos de los alimentos  [place] Furlough son los azúcares, almidones y fibras que se encuentran en las frutas, granos, verduras y productos lácteos  Los carbohidratos [employer] niveles de azúcar en la trevor  El conteo de carbohidratos puede ayudarle a comer la cantidad [place] de carbohidratos para mantener eric niveles de glucosa (azúcar) en trevor bajo control  Lo que necesita saber sobre la planificación de las comidas utilizando el conteo de carbohidratos:  · Un dietista o un médico le ayudará a desarrollar un plan alimenticio saludable que trabajará mejor para usted  Le enseñarán cuántos carbohidratos debe consumir o beber en cada comida o merienda  Hines plan alimenticio estará basado en hines edad, peso, dieta usual y [address], Susan. \Hospital Has a New Name and Outlook.\""  Si usted tiene diabetes, también incluirá hines nivel de azúcar en trevor y medicamentos para la diabetes  Cuando usted está informado de la cantidad de carbohidratos que debe consumir, entonces puede decidir los tipos de alimentos que quiere comer  · Necesitará saber qué alimentos contienen carbohidratos y cuántos contienen  Lleva la cuenta de la cantidad de carbohidratos en alimentos y meriendas para poder seguir hines plan alimenticio  No evite los carbohidratos ni omita comidas  Si usted no consume suficiente cantidad de carbohidratos u omite comidas, los niveles de azúcar en hines trevor pueden bajar excesivamente  Alimentos que contienen carbohidratos:  · Panes: Cada porción de comida en la lista siguiente contiene cerca de 15 g de carbohidratos     ? 1 rebanada de pan (1 onza) o 1 tortilla de harina o maíz (6 pulgadas)    ? La ½ de pan para hamburguesa o ¼ de paola rosquilla cristel (aproximadamente 1 onza)    ?  1 panqueque (4 pulgadas en diámetro y ¼ de [place])    · Cereales y granos: [address] porciones de cereales listos para comer puede variar  Syed el tamaño de la porción y la cantidad de carbohidratos alistados en la etiqueta alimenticia  Cada porción de comida en la lista siguiente contiene cerca de 15 g de carbohidratos     ? ¾ taza de cereal seco, sin endulzar, listo para comer o ¼ taza de granola baja en grasa    ? ½ taza de jakub u otros cereales cocidos    ? ? taza de arroz o pasta cocida    · Frijoles y vegetales con almidón: Cada porción de comida en la lista siguiente contiene cerca de 15 g de carbohidratos     ? ½ de taza de maíz, chícharos verdes, camote o puré de papa    ? ¼ de paola papa cristel horneada    ? ½ taza de frijoles, lentejas y guisantes (garbanzo, callahan, habichuela, salmon, partido, de randy roxane)    · Gilbert y meriendas: Cada porción de comida en la lista siguiente contiene cerca de 15 g de carbohidratos     ? 3 galletas de harina cuadradas u 8 galletas en forma de animalitos    ? 6 galletas saladas    ? 3 tazas de palomitas de maíz o ¾ onzas de pretzels, pippa fritas o totopos    · Frutas: Cada porción de comida en la lista siguiente contiene cerca de 15 g de carbohidratos     ? 1 pieza pequeña (4 onzas) de fruta fresca o ¾ a 1 taza de fruta fresca    ? ½ taza de fruta enlatada o congelada, envasada en jugo natural    ? ½ taza (4 onzas) de [place] de fruta sin azúcar    ? 2 cucharadas de fruta seca    · Alimentos azucarados o postres: Cada porción de comida en la lista siguiente contiene cerca de 15 g de carbohidratos     ? 1 coleen de 2 pulgadas de pastel sin glaseado o brownie    ? 2 galletas dulces pequeñas    ? ½ taza de helado, yogur congelado o yogur congelado sin lactosa    ? ¼ de taza de sorbete    ? 1 cucharada de [place] regular, mermelada o jalea    ? 2 cucharadas de jarabe ligero    · Leche y yogur: Nemiah Or contienen cerca de 12 g de carbohidratos por ración  ? 1 taza de leche sin grasa o baja en grasa    ?  [address] [place] [address] soja    ? 1 taza de yogur sin grasa que ha sido endulzado con endulzante artificial    · Verduras sin almidón: Cada porción de comida contiene cerca de 5 g de carbohidratos [place] porciones de vegetales sin almidón cuentan benjamin 1 porción de carbohidratos  ? ½ de taza de verduras cocidas o 1 taza de verduras crudas [place] incluyen remolachas, bróculi, repollo, coliflor, pepino, champiñones, tomates y calabaza  ? ½ taza de jugo de verduras    Cómo utilizar el conteo de carbohidratos para planificar las comidas:  · [employer] las cantidades de carbohidratos usando el tamaño de las porciones:     ? Ejemplo de paola ritu de pasta: Planifica comer pasta, ensalada mixta y un vaso de 8 onzas de [place]  Otoole médico le dice que puede consumir 4 porciones de carbohidratos para la ritu  Paola porción de carbohidratos de pasta es ? de taza  Paola taza de pasta será igual a 3 porciones de carbohidratos  Un vaso de 8 onzas de leche se cuenta benjamin 1 porción de carbohidratos  Estas cantidades de alimentos serían iguales a 4 porciones de carbohidratos  Paola taza de ensalada mixta no cuenta para las porciones de carbohidratos  · Cuente la cantidad de carbohidratos utilizando las etiquetas alimenticias: Busque la cantidad total de los carbohidratos en los alimentos envasados leyendo la etiqueta alimenticia  Las etiquetas alimenticias explican el tamaño de la porción del alimento y la cantidad total de los carbohidratos en cada porción  Busque el tamaño de la porción en la etiqueta alimenticia y después decida cuántas porciones comerá  Multiplique el número de porciones que planea comer por la cantidad de carbohidratos por porción  ? Ejemplo de paola merienda con Brice ferguson: Otoole plan de comidas le permite consumir 2 porciones de carbohidratos (30 gramos) benjamin bocadillo  Planea comer 1 paquete de silvio ferguson, el cual contiene 2 barras  Según la etiqueta alimenticia, el tamaño de la porción en curtis paquete es 1 liang   Cada porción (1 liang) contiene 25 gramos de carbohidratos  La cantidad total de carbohidratos en el paquete de barras de granola sería 50 gramos  Basándose en hines plan alimenticio, usted debe de comer sólo 1 liang de granola  Acuda a eric consultas de control con hines médico según le indicaron  Anote eric preguntas para que se acuerde de hacerlas cira eric visitas  © Copyright [address] [place] [address] [employer] Information is for End User's use only and may not be sold, redistributed or otherwise used for commercial purposes  All illustrations and images included in CareNotes® are the copyrighted property of [employer]  or [address] [place] [address] es sólo para uso en educación  Hines intención no es darle un consejo médico sobre enfermedades o tratamientos  Colsulte con hines Lori Clipper farmacéutico antes de seguir cualquier régimen médico para saber si es seguro y efectivo para usted  ifestyle Medicine Tip Sheet- [facility]    1  Coma alimentos predominantemente menos procesados, benjamin comida rápida, cenas de televisión y tocino  2  Coma cerca de la naturaleza (mercados de agricultores, productos frescos o congelados)    3  Coma paola dieta predominantemente basada en plantas  a  Verdes frondosos oscuros hyun   b  Frutas y vegetales  c   Granos integrales: malvin integral, apenas, bayas de malvin, quinua, jakub cortada en hannah, arroz integral, pasta integral   d  Legumbres: frijoles, frijoles pintos, frijoles blancos, frijoles negros, garbanzos (garbanzos), frijoles lima (maduros, secos), arvejas, lentejas y edamame (frijoles de soya verdes)      4  Al menos la mitad del plato debe contener frutas o verduras  5  El líquido debe ser predominantemente agua (limite el refresco y [place])    6  Geovanna el tamaño de la porción  7  ¿Qué alimentos reena evitar o limitar? - Grasas: Específicamente saturadas y grasas trans   Se encuentran en margarinas, muchas comidas rápidas y algunos productos horneados comprados en la cathleen  Las grasas saturadas y grasas trans pueden elevar hines nivel de colesterol y hines probabilidad de contraer enfermedades cardíacas  - Cuando cocine, es mejor no usar aceites, kristina si es necesario, trate de limitar la cantidad de aceite utilizado, ya que el aceite contiene muchas calorías por volumen y es muy poco saludable cuando se calienta cira la cocción   - Azúcar: limite o evite el azúcar, los dulces y los granos refinados  Los granos refinados se encuentran en el pan salmon, el arroz salmon, la mayoría de las formas de pasta y la mayoría de los alimentos "aperitivos" envasados  - Intente no cocinar con jose y evite agregar jose adicional a eric comidas  - Sydnee Tuan: los estudios ro demostrado que comer mucha patricia Jessica riesgo de ciertos problemas de [place], benjamin enfermedades cardíacas y cáncer  8  7-9 horas de sueño    9  Ejercicio diario mínimo de 30 minutos (caminando alrededor de la nya)    10  Socialización (amigos y familiares)    - Explora tu vecindario  Ve al parque, pasa tiempo en la biblioteca  Si está interesado, puede leer más sobre las opciones de alimentos saludables en los siguientes sitios web:  a  Nutritionfacts  org  b  Home cooking recipes: https://www Photos to Photos com/  c  http://susanne info/  d  Familydoctor  org

## 2021-01-15 NOTE — ASSESSMENT & PLAN NOTE
A  Lab Results   Component Value Date    HGBA1C 9 3 (A) 01/15/2021     HbA1c goal of 7  Currently A1c trending up from 7 3-->9  3   Will start patient on Metformin 500 mg x2 weeks, and then increase dose to 500 mg BID   BG supplies ordered for patient to check fasting BG daily   Advised to brings BG logs to next visit   Not on anti-lipid medication, start Lipitor 10 mg   Microalbuminuria most likely 2/2 to uncontrolled DMT2, continue with Lisinopril 20 mg   Adhere to diabetic diet and exercise   Will consider diabetic education if his BG are not controlled at the next visit   Referral to podiatry initiated  Annual ophtalmology and podiatry visits encouraged

## 2021-01-16 PROBLEM — N52.9 ERECTILE DYSFUNCTION: Status: ACTIVE | Noted: 2021-01-16

## 2021-01-16 PROBLEM — Z12.11 ENCOUNTER FOR SCREENING COLONOSCOPY: Status: ACTIVE | Noted: 2021-01-16

## 2021-01-16 NOTE — ASSESSMENT & PLAN NOTE
Discussed the importance of undergoing screening colonoscopy   Patient agreeable, will send referral to general surgery for consult

## 2021-01-16 NOTE — ASSESSMENT & PLAN NOTE
Lipid panel reviewed   ASCVD risk 7 6%  Will start patient on Lipitor 10 mg HS  Adhere to low fat diet

## 2021-01-20 ENCOUNTER — TELEMEDICINE (OUTPATIENT)
Dept: FAMILY MEDICINE CLINIC | Facility: CLINIC | Age: 56
End: 2021-01-20

## 2021-01-20 DIAGNOSIS — U07.1 COVID-19 VIRUS INFECTION: Primary | ICD-10-CM

## 2021-01-20 PROCEDURE — 99212 OFFICE O/P EST SF 10 MIN: CPT | Performed by: FAMILY MEDICINE

## 2021-01-20 PROCEDURE — 1036F TOBACCO NON-USER: CPT | Performed by: FAMILY MEDICINE

## 2021-01-20 NOTE — ASSESSMENT & PLAN NOTE
First positive COVID test: 12/15/20  He was asymptomatic and remains the same   Had two repeat COVID-19 tests on 01/02 and 01/14 with positive results  He took these tests as his work required him to have negative results for returning    Discussed with patient that per CDC guidelines it is not recommended to have repeat Covid test within 3 months period from infection due to persisting positive result   He completed quarantine period and is cleared to return to work on 01/25//21  Will provide work letter for patient to  tomorrow in person

## 2021-01-20 NOTE — PROGRESS NOTES
COVID-19 Virtual Visit     Assessment/Plan:    Problem List Items Addressed This Visit        Other    COVID-19 virus infection - Primary     First positive COVID test: 12/15/20  He was asymptomatic and remains the same   Had two repeat COVID-19 tests on 01/02 and 01/14 with positive results  He took these tests as his work required him to have negative results for returning  Discussed with patient that per CDC guidelines it is not recommended to have repeat Covid test within 3 months period from infection due to persisting positive result   He completed quarantine period and is cleared to return to work on 01/25//21  Will provide work letter for patient to  tomorrow in person                 Disposition:         Patient completed quarantine, he is cleared to return to work  I have spent 15 minutes directly with the patient  Encounter provider Donal Diaz MD    Provider located at [address] [place] [id number] [phone]    Recent Visits  Date Type Provider Dept   01/15/21 Office Visit Donal Diaz MD  [facility]   Showing recent visits within past 7 days and meeting all other requirements     Today's Visits  Date Type Provider Dept   01/20/21 Herman Barrera MD  [facility]   Showing today's visits and meeting all other requirements     Future Appointments  No visits were found meeting these conditions  Showing future appointments within next 150 days and meeting all other requirements        Patient agrees to participate in a virtual check in via telephone or video visit instead of presenting to the office to address urgent/immediate medical needs  Patient is aware this is a billable service  After connecting through Telephone, the patient was identified by name and date of birth   Joselo Koehler was informed that this was a telemedicine visit and that the exam was being conducted confidentially over secure lines  My office door was closed  No one else was in the room  Maine Irvin acknowledged consent and understanding of privacy and security of the telemedicine visit  I informed the patient that I have reviewed his record in Epic and presented the opportunity for him to ask any questions regarding the visit today  The patient agreed to participate  Subjective:   Maine Irvin is a 50 y o  male who has been screened for COVID-19  Symptom change since last report: resolving  Patient is currently asymptomatic  Patient denies fever, chills, fatigue, malaise, congestion, rhinorrhea, sore throat, anosmia, loss of taste, cough, shortness of breath, chest tightness, abdominal pain, nausea, vomiting, diarrhea, myalgias and headaches  Kwadwo Barragan has been staying home and has isolated themselves in his home  He is taking care to not share personal items and is cleaning all surfaces that are touched often, like counters, tabletops, and doorknobs using household cleaning sprays or wipes  He is wearing a mask when he leaves his room       Date of exposure: 12/15/2020  Date of positive COVID-19 PCR: 12/15/2020    Lab Results   Component Value Date    SARSCOV2 Detected (A) 12/15/2020     Past Medical History:   Diagnosis Date    Diabetes mellitus ([facility] [address] )     Glaucoma     Hypertension      Past Surgical History:   Procedure Laterality Date    CARPAL TUNNEL RELEASE      HERNIA REPAIR       Current Outpatient Medications   Medication Sig Dispense Refill    albuterol (PROVENTIL HFA,VENTOLIN HFA) 90 mcg/act inhaler Inhale 1-2 puffs every 6 (six) hours as needed for wheezing 1 Inhaler 0    Ascorbic Acid (vitamin C) 1000 MG tablet Take 1 tablet (1,000 mg total) by mouth daily 14 tablet 0    atorvastatin (LIPITOR) 10 mg tablet Take 1 tablet (10 mg total) by mouth daily 30 tablet 3    Blood Glucose Monitoring Suppl (OneTouch Verio) w/Device KIT Use daily 1 kit 0    cholecalciferol (VITAMIN D3) 1,000 units tablet Take 1 tablet (1,000 Units total) by mouth daily 14 tablet 0    Ergocalciferol (Vitamin D2) 10 MCG (400 UNIT) TABS Take 1 tablet (400 Units total) by mouth once a week 5 tablet 0    glucose blood (OneTouch Verio) test strip Use as instructed 100 each 0    latanoprost (XALATAN) 0 005 % ophthalmic solution Administer 1 drop to both eyes daily at bedtime 2 5 mL 3    lisinopril (ZESTRIL) 10 mg tablet Take 1 tablet (10 mg total) by mouth daily 30 tablet 3    metFORMIN (GLUCOPHAGE) 500 mg tablet Take 1 tablet (500 mg) by mouth once a day for the first 2 weeks  Then take 1 tablet (500 mg) by mouth 2 times a day with meals  60 tablet 1    OneTouch Delica Lancets 90I MISC Use every morning 100 each 0     No current facility-administered medications for this visit  No Known Allergies    Review of Systems   Constitutional: Negative for chills, fatigue and fever  HENT: Negative for congestion, rhinorrhea and sore throat  Respiratory: Negative for cough, chest tightness and shortness of breath  Gastrointestinal: Negative for abdominal pain, diarrhea, nausea and vomiting  Musculoskeletal: Negative for myalgias  Neurological: Negative for headaches  Objective:    Telephone Exam  Rudolph Martinez appears alert, cooperative and in no apparent distress  It was my intent to perform this visit via video technology but the patient was not able to do a video connection so the visit was completed via audio telephone only  There were no vitals filed for this visit  VIRTUAL VISIT DISCLAIMER    Tracey Nichole acknowledges that he has consented to an online visit or consultation  He understands that the online visit is based solely on information provided by him, and that, in the absence of a face-to-face physical evaluation by the physician, the diagnosis he receives is both limited and provisional in terms of accuracy and completeness  This is not intended to replace a full medical face-to-face evaluation by the physician  Polina Padgett understands and accepts these terms

## 2021-01-20 NOTE — LETTER
January 20, 2021     Patient: Ally Ramos   YOB: 1965   Date of Visit: 1/20/2021       To Whom it May Concern:    Ally Ramos is under my professional care  He was seen via virtual visit on 1/20/2021  He may return to work on January 25, 2021  Patient completed quarantine period for positive COVID-19 resulted on 12/15/2020  Per CDC guidelines he does not need negative COVID-19 test to return to work  If you have any questions or concerns, please don't hesitate to call           Sincerely,      Ericka Rodriguez MD        CC: No Recipients

## 2021-01-25 RX ORDER — COVID-19 TEST SPECIMEN COLLECT
MISCELLANEOUS MISCELLANEOUS
COMMUNITY
Start: 2021-01-04 | End: 2021-12-09 | Stop reason: ALTCHOICE

## 2021-02-11 NOTE — PROGRESS NOTES
Assessment/Plan:    Type 2 diabetes mellitus without complication, without long-term current use of insulin (HCC)    Lab Results   Component Value Date    HGBA1C 9 3 (A) 01/15/2021     HbA1c goal of 7  Current A1c 9 3   Increase Metformin from 500 mg BID to 1000 mg BID   Advised to brings BG logs to next visit   Continue with Lipitor 10 mg HS  Microalbuminuria most likely 2/2 to uncontrolled DMT2, continue with Lisinopril 20 mg QD  Adhere to diabetic diet and exercise  Will consider diabetic education if his BG are not controlled at the next visit  Annual ophtalmology and podiatry visits encouraged  F/u in 1 month         Diagnoses and all orders for this visit:    Type 2 diabetes mellitus without complication, without long-term current use of insulin (HCC)  -     metFORMIN (GLUCOPHAGE) 1000 MG tablet; Take 1 tablet (1000 mg) by mouth 2 times a day with meals  Other hyperlipidemia  -     atorvastatin (LIPITOR) 10 mg tablet; Take 1 tablet (10 mg total) by mouth daily    Medication refill  -     lisinopril (ZESTRIL) 10 mg tablet; Take 1 tablet (10 mg total) by mouth daily          Subjective:      Patient ID: Nerissa Benson is a 50 y o  male who presents today for DMT2 f/u visit  HPI     Patient states he checks BG at home highest reading 277 and lowest 158  Compliant at home with taking Metformin 500 mg BID  Denies any medication side effects with current home regimen  Denies any recent hypoglycemic events  Diet consists of salads, lean meat  Last podiatry date was misplaced, he forgot the date and missed the appt  Ophthalmology scheduled in March 2021  Currently, patient denies microvascular sx (blurry vision, visual disturbances, polyuria, tingling, numbness, pain) and macrovascular sx (chest pain, palpitation, excertional and rest shortness of breath, lower ext  swelling)       The following portions of the patient's history were reviewed and updated as appropriate: allergies, current medications, past family history, past medical history, past social history, past surgical history and problem list     Review of Systems   Constitutional: Negative for chills and fever  HENT: Negative for sore throat  Eyes: Negative for visual disturbance  Respiratory: Negative  Negative for cough and shortness of breath  Cardiovascular: Negative for chest pain and leg swelling  Gastrointestinal: Negative  Negative for abdominal pain, blood in stool, constipation, diarrhea, nausea and vomiting  Genitourinary: Negative  Neurological: Negative for dizziness and headaches  Psychiatric/Behavioral: The patient is not nervous/anxious  Objective:    /88 (BP Location: Right arm, Patient Position: Sitting, Cuff Size: Adult)   Pulse 80   Temp (!) 97 4 °F (36 3 °C) (Temporal)   Resp 20   Ht 5' 6" (1 676 m)   Wt 97 3 kg (214 lb 8 oz)   SpO2 96%   BMI 34 62 kg/m²      Physical Exam  Vitals signs and nursing note reviewed  Constitutional:       General: He is not in acute distress  Appearance: Normal appearance  He is well-developed  He is obese  He is not ill-appearing, toxic-appearing or diaphoretic  HENT:      Head: Normocephalic and atraumatic  Nose: Nose normal    Eyes:      Extraocular Movements: Extraocular movements intact  Conjunctiva/sclera: Conjunctivae normal       Pupils: Pupils are equal, round, and reactive to light  Neck:      Musculoskeletal: Normal range of motion and neck supple  Cardiovascular:      Rate and Rhythm: Normal rate and regular rhythm  Heart sounds: Normal heart sounds  Pulmonary:      Effort: Pulmonary effort is normal  No respiratory distress  Breath sounds: Normal breath sounds  Abdominal:      General: Bowel sounds are normal       Palpations: Abdomen is soft  Tenderness: There is no abdominal tenderness  Musculoskeletal: Normal range of motion  General: No tenderness or deformity  Right lower leg: No edema  Left lower leg: No edema  Skin:     General: Skin is warm  Findings: No rash  Neurological:      Mental Status: He is alert and oriented to person, place, and time  Psychiatric:         Mood and Affect: Mood normal          Behavior: Behavior normal          Thought Content:  Thought content normal          Judgment: Judgment normal

## 2021-02-11 NOTE — ASSESSMENT & PLAN NOTE
Lab Results   Component Value Date    HGBA1C 9 3 (A) 01/15/2021     HbA1c goal of 7  Current A1c 9 3   Increase Metformin from 500 mg BID to 1000 mg BID   Advised to brings BG logs to next visit   Continue with Lipitor 10 mg HS  Microalbuminuria most likely 2/2 to uncontrolled DMT2, continue with Lisinopril 20 mg QD  Adhere to diabetic diet and exercise  Will consider diabetic education if his BG are not controlled at the next visit  Annual ophtalmology and podiatry visits encouraged    F/u in 1 month

## 2021-02-12 ENCOUNTER — OFFICE VISIT (OUTPATIENT)
Dept: FAMILY MEDICINE CLINIC | Facility: CLINIC | Age: 56
End: 2021-02-12

## 2021-02-12 VITALS
TEMPERATURE: 97.4 F | WEIGHT: 214.5 LBS | RESPIRATION RATE: 20 BRPM | HEART RATE: 80 BPM | SYSTOLIC BLOOD PRESSURE: 134 MMHG | OXYGEN SATURATION: 96 % | DIASTOLIC BLOOD PRESSURE: 88 MMHG | HEIGHT: 66 IN | BODY MASS INDEX: 34.47 KG/M2

## 2021-02-12 DIAGNOSIS — E11.9 TYPE 2 DIABETES MELLITUS WITHOUT COMPLICATION, WITHOUT LONG-TERM CURRENT USE OF INSULIN (HCC): Primary | ICD-10-CM

## 2021-02-12 DIAGNOSIS — E78.49 OTHER HYPERLIPIDEMIA: ICD-10-CM

## 2021-02-12 DIAGNOSIS — Z76.0 MEDICATION REFILL: ICD-10-CM

## 2021-02-12 PROCEDURE — 4010F ACE/ARB THERAPY RXD/TAKEN: CPT | Performed by: PODIATRIST

## 2021-02-12 PROCEDURE — 99213 OFFICE O/P EST LOW 20 MIN: CPT | Performed by: FAMILY MEDICINE

## 2021-02-12 PROCEDURE — 4010F ACE/ARB THERAPY RXD/TAKEN: CPT | Performed by: SURGERY

## 2021-02-12 RX ORDER — LISINOPRIL 10 MG/1
10 TABLET ORAL DAILY
Qty: 30 TABLET | Refills: 3 | Status: SHIPPED | OUTPATIENT
Start: 2021-02-12 | End: 2021-06-04 | Stop reason: SDUPTHER

## 2021-02-12 RX ORDER — ATORVASTATIN CALCIUM 10 MG/1
10 TABLET, FILM COATED ORAL DAILY
Qty: 30 TABLET | Refills: 3 | Status: SHIPPED | OUTPATIENT
Start: 2021-02-12 | End: 2021-06-04 | Stop reason: SDUPTHER

## 2021-02-12 NOTE — PATIENT INSTRUCTIONS
Conteo básico de carbohidratos   CUIDADO AMBULATORIO:   El conteo de carbohidratos es [place] de planificar eric comidas contando la cantidad de carbohidratos de los alimentos  Mercedez Aranza son los azúcares, almidones y fibras que se encuentran en las frutas, granos, verduras y productos lácteos  Los carbohidratos [employer] niveles de azúcar en la trevor  El conteo de carbohidratos puede ayudarle a comer la cantidad [place] de carbohidratos para mantener eric niveles de glucosa (azúcar) en trevor bajo control  Lo que necesita saber sobre la planificación de las comidas utilizando el conteo de carbohidratos:  · Un dietista o un médico le ayudará a desarrollar un plan alimenticio saludable que trabajará mejor para usted  Le enseñarán cuántos carbohidratos debe consumir o beber en cada comida o merienda  Hines plan alimenticio estará basado en hines edad, peso, dieta usual y [facility]  Si usted tiene diabetes, también incluirá hines nivel de azúcar en trevor y medicamentos para la diabetes  Cuando usted está informado de la cantidad de carbohidratos que debe consumir, entonces puede decidir los tipos de alimentos que quiere comer  · Necesitará saber qué alimentos contienen carbohidratos y cuántos contienen  Lleva la cuenta de la cantidad de carbohidratos en alimentos y meriendas para poder seguir hines plan alimenticio  No evite los carbohidratos ni omita comidas  Si usted no consume suficiente cantidad de carbohidratos u omite comidas, los niveles de azúcar en hines trevor pueden bajar excesivamente  Alimentos que contienen carbohidratos:  · Panes: Cada porción de comida en la lista siguiente contiene cerca de 15 g de carbohidratos     ? 1 rebanada de pan (1 onza) o 1 tortilla de harina o maíz (6 pulgadas)    ? La ½ de pan para hamburguesa o ¼ de paola rosquilla cristel (aproximadamente 1 onza)    ?  1 panqueque (4 pulgadas en diámetro y ¼ de [place])    · Cereales y granos: [address] porciones de cereales listos para comer puede variar  Syed el tamaño de la porción y la cantidad de carbohidratos alistados en la etiqueta alimenticia  Cada porción de comida en la lista siguiente contiene cerca de 15 g de carbohidratos     ? ¾ taza de cereal seco, sin endulzar, listo para comer o ¼ taza de granola baja en grasa    ? ½ taza de jakub u otros cereales cocidos    ? ? taza de arroz o pasta cocida    · Frijoles y vegetales con almidón: Cada porción de comida en la lista siguiente contiene cerca de 15 g de carbohidratos     ? ½ de taza de maíz, chícharos verdes, camote o puré de papa    ? ¼ de paola papa cristel horneada    ? ½ taza de frijoles, lentejas y guisantes (garbanzo, callahan, habichuela, salmon, partido, de randy roxane)    · Gilbert y meriendas: Cada porción de comida en la lista siguiente contiene cerca de 15 g de carbohidratos     ? 3 galletas de harina cuadradas u 8 galletas en forma de animalitos    ? 6 galletas saladas    ? 3 tazas de palomitas de maíz o ¾ onzas de pretzels, pippa fritas o totopos    · Frutas: Cada porción de comida en la lista siguiente contiene cerca de 15 g de carbohidratos     ? 1 pieza pequeña (4 onzas) de fruta fresca o ¾ a 1 taza de fruta fresca    ? ½ taza de fruta enlatada o congelada, envasada en jugo natural    ? ½ taza (4 onzas) de [place] de fruta sin azúcar    ? 2 cucharadas de fruta seca    · Alimentos azucarados o postres: Cada porción de comida en la lista siguiente contiene cerca de 15 g de carbohidratos     ? 1 coleen de 2 pulgadas de pastel sin glaseado o brownie    ? 2 galletas dulces pequeñas    ? ½ taza de helado, yogur congelado o yogur congelado sin lactosa    ? ¼ de taza de sorbete    ? 1 cucharada de [place] regular, mermelada o jalea    ? 2 cucharadas de jarabe ligero    · Leche y yogur: Susanne Morgan contienen cerca de 12 g de carbohidratos por ración  ? 1 taza de leche sin grasa o baja en grasa    ?  [address] [facility] soja    ? 1 taza de yogur sin grasa que ha sido endulzado con endulzante artificial    · Verduras sin almidón: Cada porción de comida contiene cerca de 5 g de carbohidratos [place]bin porciones de vegetales sin almidón cuentan benjamin 1 porción de carbohidratos  ? ½ de taza de verduras cocidas o 1 taza de verduras crudas [place] incluyen remolachas, bróculi, repollo, coliflor, pepino, champiñones, tomates y calabaza  ? ½ taza de jugo de verduras    Cómo utilizar el conteo de carbohidratos para planificar las comidas:  · [employer] las cantidades de carbohidratos usando el tamaño de las porciones:     ? Ejemplo de paola ritu de pasta: Planifica comer pasta, ensalada mixta y un vaso de 8 onzas de [place]  Otoole médico le dice que puede consumir 4 porciones de carbohidratos para la ritu  Paola porción de carbohidratos de pasta es ? de taza  Paola taza de pasta será igual a 3 porciones de carbohidratos  Un vaso de 8 onzas de leche se cuenta benjamin 1 porción de carbohidratos  Estas cantidades de alimentos serían iguales a 4 porciones de carbohidratos  [place] taza de ensalada mixta no cuenta para las porciones de carbohidratos  · Cuente la cantidad de carbohidratos utilizando las etiquetas alimenticias: Busque la cantidad total de los carbohidratos en los alimentos envasados leyendo la etiqueta alimenticia  Las etiquetas alimenticias explican el tamaño de la porción del alimento y la cantidad total de los carbohidratos en cada porción  Busque el tamaño de la porción en la etiqueta alimenticia y después decida cuántas porciones comerá  Multiplique el número de porciones que planea comer por la cantidad de carbohidratos por porción  ? Ejemplo de paola merienda con [place] liang de granola: Otoole plan de comidas le permite consumir 2 porciones de carbohidratos (30 gramos) benjamin bocadillo  Planea comer 1 paquete de [facility], el cual contiene 2 barras  Según la etiqueta alimenticia, el tamaño de la porción en curtis paquete es 1 liang   Cada porción (1 liang) contiene 25 gramos de carbohidratos  La cantidad total de carbohidratos en el paquete de barras de granola sería 50 gramos  Basándose en hines plan alimenticio, usted debe de comer sólo 1 liang de granola  Acuda a eric consultas de control con hines médico según le indicaron  Anote eric preguntas para que se acuerde de hacerlas cira eric visitas  © [address] Information is for End User's use only and may not be sold, redistributed or otherwise used for commercial purposes  All illustrations and images included in CareNotes® are the copyrighted property of A D A M , [employer]  or [address] [place] [address] es sólo para uso en educación  Hines intención no es darle un consejo médico sobre enfermedades o tratamientos  Colsulte con hines Ady Riley farmacéutico antes de seguir cualquier régimen médico para saber si es seguro y efectivo para usted

## 2021-02-13 DIAGNOSIS — E11.9 TYPE 2 DIABETES MELLITUS WITHOUT COMPLICATION, WITHOUT LONG-TERM CURRENT USE OF INSULIN (HCC): ICD-10-CM

## 2021-02-13 RX ORDER — LANCETS 33 GAUGE
EACH MISCELLANEOUS
Qty: 100 EACH | Refills: 1 | Status: SHIPPED | OUTPATIENT
Start: 2021-02-13

## 2021-02-22 ENCOUNTER — OFFICE VISIT (OUTPATIENT)
Dept: FAMILY MEDICINE CLINIC | Facility: CLINIC | Age: 56
End: 2021-02-22

## 2021-02-22 ENCOUNTER — APPOINTMENT (OUTPATIENT)
Dept: LAB | Facility: CLINIC | Age: 56
End: 2021-02-22
Payer: COMMERCIAL

## 2021-02-22 VITALS
RESPIRATION RATE: 20 BRPM | SYSTOLIC BLOOD PRESSURE: 142 MMHG | TEMPERATURE: 97.6 F | HEART RATE: 88 BPM | WEIGHT: 212 LBS | DIASTOLIC BLOOD PRESSURE: 84 MMHG | OXYGEN SATURATION: 95 % | BODY MASS INDEX: 34.07 KG/M2 | HEIGHT: 66 IN

## 2021-02-22 DIAGNOSIS — B35.1 ONYCHOMYCOSIS: ICD-10-CM

## 2021-02-22 DIAGNOSIS — E11.9 TYPE 2 DIABETES MELLITUS WITHOUT COMPLICATION, WITHOUT LONG-TERM CURRENT USE OF INSULIN (HCC): ICD-10-CM

## 2021-02-22 DIAGNOSIS — B35.1 ONYCHOMYCOSIS: Primary | ICD-10-CM

## 2021-02-22 LAB
ALBUMIN SERPL BCP-MCNC: 4.2 G/DL (ref 3.5–5)
ALP SERPL-CCNC: 109 U/L (ref 46–116)
ALT SERPL W P-5'-P-CCNC: 91 U/L (ref 12–78)
AST SERPL W P-5'-P-CCNC: 48 U/L (ref 5–45)
BILIRUB DIRECT SERPL-MCNC: 0.19 MG/DL (ref 0–0.2)
BILIRUB SERPL-MCNC: 0.72 MG/DL (ref 0.2–1)
PROT SERPL-MCNC: 7.6 G/DL (ref 6.4–8.2)

## 2021-02-22 PROCEDURE — 36415 COLL VENOUS BLD VENIPUNCTURE: CPT

## 2021-02-22 PROCEDURE — 80076 HEPATIC FUNCTION PANEL: CPT

## 2021-02-22 PROCEDURE — 1036F TOBACCO NON-USER: CPT | Performed by: PODIATRIST

## 2021-02-22 PROCEDURE — 99202 OFFICE O/P NEW SF 15 MIN: CPT | Performed by: PODIATRIST

## 2021-02-22 NOTE — PROGRESS NOTES
At [address] 50 y o  male MRN: [id number]  Encounter: [id number]      Assessment/Plan        Diagnoses and all orders for this visit:    Onychomycosis  -     Hepatic function panel; Future    Type 2 diabetes mellitus without complication, without long-term current use of insulin ([facility] )       Plan:   Patient was seen/examined  All questions and concerns addressed   Discussed treatment options for onychomycosis including OTC options, topicals, and oral terbinafine  He has tried oral terbinafine in the past for 7d, but would like to try again  LFTs ordered to assess prior to rx for lamisil   Educated patient on proper diabetic foot care; checking feet every day, wearing white socks, always wearing diabetic shoes even in house, tight glycemic control, proper low-sugar diet and exercise   RTC in 1 week(s) to review studies and rx lamisil    Dr Chauncey Amato was present during this entire procedure  History of Present Illness     HPI:  Ana Cruz is a 50 y o  male who presents with mycotic toenails  States that their nails are elongated and he does not like the appearance of them  He tried lamisil for 7d with limited success in the past  Patient denies numbness/tingling  They state their last blood glucose level was 130mg/dL  The patient denies any nausea, vomiting, fever, chills, shortness of breath, or chest pains  Review of Systems   Constitutional: Negative  HENT: Negative  Eyes: Negative  Respiratory: Negative  Cardiovascular: Negative  Gastrointestinal: Negative  Musculoskeletal: Negative   Skin: elongated thickened toenails  Neurological: Negative          Historical Information   Past Medical History:   Diagnosis Date    Diabetes mellitus ([facility] )     Glaucoma     Hypertension      Past Surgical History:   Procedure Laterality Date    CARPAL TUNNEL RELEASE      HERNIA REPAIR       Social History   Social History     Substance and Sexual Activity Alcohol Use Never    Frequency: Never     Social History     Substance and Sexual Activity   Drug Use Never     Social History     Tobacco Use   Smoking Status Never Smoker   Smokeless Tobacco Never Used     Family History: No family history on file  Meds/Allergies   (Not in a hospital admission)    No Known Allergies    Objective     Current Vitals:   Blood Pressure: 142/84 (02/22/21 0852)  Pulse: 88 (02/22/21 0852)  Temperature: 97 6 °F (36 4 °C) (02/22/21 0852)  Temp Source: Temporal (02/22/21 0852)  Respirations: 20 (02/22/21 0852)  Height: 5' 6" (167 6 cm) (02/22/21 6666)  Weight - Scale: 96 2 kg (212 lb) (02/22/21 0852)  SpO2: 95 % (02/22/21 0852)        /84 (BP Location: Right arm, Patient Position: Sitting, Cuff Size: Large)   Pulse 88   Temp 97 6 °F (36 4 °C) (Temporal)   Resp 20   Ht 5' 6" (1 676 m)   Wt 96 2 kg (212 lb)   SpO2 95%   BMI 34 22 kg/m²       Lower Extremity Exam:    Pulses:  Right foot DP +2; PT +2     Left foot DP +2; PT +2    Edema: There is no lower extremity edema bilateral     Sensation to 5 07 [place]-Arun nylon filament:      Toes positive bilaterally      Midfoot  positive bilaterally      Heel positive bilaterally      Leg positive bilaterally    Vibratory sense :       Distal Foot  positive bilaterally                    Sharp/Dull sense is positive bilaterally      Skin:      Skin Condition:  mobility and turgor normal and no abnormal pigmentation                There is not evidence of macerated tissue between toe spaces  Nail Exam: onychomycosis of the toenails  Open ulcerations: No     Calluses: No    Musculoskeletal:     There is 5/5 strength throughout the bilateral lower extremity  full ankle range of motion with well maintained subtalar range of motion  There is no tenderness over the foot and ankle          There is not foot deformities:    Risk Category:   0 = No loss of protective sensation

## 2021-02-23 ENCOUNTER — CONSULT (OUTPATIENT)
Dept: SURGERY | Facility: CLINIC | Age: 56
End: 2021-02-23
Payer: COMMERCIAL

## 2021-02-23 VITALS
HEART RATE: 79 BPM | BODY MASS INDEX: 34.07 KG/M2 | SYSTOLIC BLOOD PRESSURE: 134 MMHG | TEMPERATURE: 97.9 F | WEIGHT: 212 LBS | HEIGHT: 66 IN | DIASTOLIC BLOOD PRESSURE: 88 MMHG

## 2021-02-23 DIAGNOSIS — E11.9 TYPE 2 DIABETES MELLITUS WITHOUT COMPLICATION, WITHOUT LONG-TERM CURRENT USE OF INSULIN (HCC): ICD-10-CM

## 2021-02-23 DIAGNOSIS — Z12.11 SCREENING FOR COLON CANCER: Primary | ICD-10-CM

## 2021-02-23 PROCEDURE — 3008F BODY MASS INDEX DOCD: CPT | Performed by: SURGERY

## 2021-02-23 PROCEDURE — 99204 OFFICE O/P NEW MOD 45 MIN: CPT | Performed by: SURGERY

## 2021-02-23 PROCEDURE — 1036F TOBACCO NON-USER: CPT | Performed by: SURGERY

## 2021-02-23 PROCEDURE — 3008F BODY MASS INDEX DOCD: CPT | Performed by: PODIATRIST

## 2021-02-23 NOTE — H&P (VIEW-ONLY)
Assessment/Plan:    Screening for colon cancer, average risk and age-appropriate  Discussed risks and benefits of colonoscopy including bleeding if polypectomy performed, abdominal discomfort and small risk of colon perforation  Explained bowel prep in detail and gave instructions   For MiraLax bowel prep  Procedure will be scheduled at earliest convenience  no preoperative clearance required    No problem-specific Assessment & Plan notes found for this encounter  Diagnoses and all orders for this visit:    Screening for colon cancer    Type 2 diabetes mellitus without complication, without long-term current use of insulin ([address] )  -     Ambulatory referral to Podiatry    Other orders  -     metFORMIN (GLUCOPHAGE) 500 mg tablet          Subjective:      Patient ID: Trevor Costello is a 50 y o  male  Patient presents for screening colonoscopy  He has had no previous colonoscopy  He denies any family history of colon cancer  No constipation / diarrhea, no blood in his stool, abdominal pain or rectal pain  The following portions of the patient's history were reviewed and updated as appropriate: He  has a past medical history of Diabetes mellitus ([address] ), Glaucoma, and Hypertension  He   Patient Active Problem List    Diagnosis Date Noted    COVID-19 virus infection 01/20/2021    Encounter for screening colonoscopy 01/16/2021    Erectile dysfunction 01/16/2021    Establishing care with new doctor, encounter for 10/26/2020    BMI 35 0-35 9,adult 10/26/2020    Need for vaccination 10/26/2020    Type 2 diabetes mellitus without complication, without long-term current use of insulin ([address] ) 10/26/2020    Other hyperlipidemia 10/26/2020    Other fatigue 10/26/2020    Encounter for screening for HIV 10/26/2020     He  has a past surgical history that includes Hernia repair and Carpal tunnel release  His family history is not on file  He  reports that he has never smoked   He has never used smokeless tobacco  He reports that he does not drink alcohol or use drugs  Current Outpatient Medications   Medication Sig Dispense Refill    atorvastatin (LIPITOR) 10 mg tablet Take 1 tablet (10 mg total) by mouth daily 30 tablet 3    Blood Glucose Monitoring Suppl (OneTouch Verio) w/Device KIT Use daily 1 kit 0    glucose blood (OneTouch Verio) test strip Use as instructed 100 each 0    Lancets (OneTouch Delica Plus [id number]) MISC USE EVERY MORNING 100 each 1    latanoprost (XALATAN) 0 005 % ophthalmic solution Administer 1 drop to both eyes daily at bedtime 2 5 mL 3    lisinopril (ZESTRIL) 10 mg tablet Take 1 tablet (10 mg total) by mouth daily 30 tablet 3    metFORMIN (GLUCOPHAGE) 1000 MG tablet Take 1 tablet (1000 mg) by mouth 2 times a day with meals  60 tablet 2    albuterol (PROVENTIL HFA,VENTOLIN HFA) 90 mcg/act inhaler Inhale 1-2 puffs every 6 (six) hours as needed for wheezing (Patient not taking: Reported on 2/23/2021) 1 Inhaler 0    Ascorbic Acid (vitamin C) 1000 MG tablet Take 1 tablet (1,000 mg total) by mouth daily (Patient not taking: Reported on 2/12/2021) 14 tablet 0    cholecalciferol (VITAMIN D3) 1,000 units tablet Take 1 tablet (1,000 Units total) by mouth daily (Patient not taking: Reported on 2/12/2021) 14 tablet 0    COVID-19 Specimen Collection KIT TEST AS DIRECTED      Ergocalciferol (Vitamin D2) 10 MCG (400 UNIT) TABS Take 1 tablet (400 Units total) by mouth once a week (Patient not taking: Reported on 2/12/2021) 5 tablet 0    metFORMIN (GLUCOPHAGE) 500 mg tablet        No current facility-administered medications for this visit  He has No Known Allergies       Review of Systems   All other systems reviewed and are negative          Objective:      /88 (BP Location: Left arm, Patient Position: Sitting, Cuff Size: Large)   Pulse 79   Temp 97 9 °F (36 6 °C) (Tympanic)   Ht 5' 6" (1 676 m)   Wt 96 2 kg (212 lb)   BMI 34 22 kg/m²          Physical Exam  Vitals signs reviewed  Constitutional:       Appearance: Normal appearance  He is obese  HENT:      Head: Normocephalic and atraumatic  Eyes:      Conjunctiva/sclera: Conjunctivae normal       Pupils: Pupils are equal, round, and reactive to light  Neck:      Musculoskeletal: Normal range of motion and neck supple  Cardiovascular:      Rate and Rhythm: Normal rate and regular rhythm  Pulmonary:      Effort: Pulmonary effort is normal  No respiratory distress  Breath sounds: Normal breath sounds  Abdominal:      General: Abdomen is flat  There is no distension  Palpations: Abdomen is soft  Tenderness: There is no abdominal tenderness  Musculoskeletal: Normal range of motion  General: No swelling or tenderness  Skin:     General: Skin is warm and dry  Neurological:      General: No focal deficit present  Mental Status: He is alert and oriented to person, place, and time  Psychiatric:         Mood and Affect: Mood normal          Behavior: Behavior normal          Thought Content:  Thought content normal          Judgment: Judgment normal

## 2021-02-23 NOTE — PROGRESS NOTES
Assessment/Plan:    Screening for colon cancer, average risk and age-appropriate  Discussed risks and benefits of colonoscopy including bleeding if polypectomy performed, abdominal discomfort and small risk of colon perforation  Explained bowel prep in detail and gave instructions   For MiraLax bowel prep  Procedure will be scheduled at earliest convenience  no preoperative clearance required    No problem-specific Assessment & Plan notes found for this encounter  Diagnoses and all orders for this visit:    Screening for colon cancer    Type 2 diabetes mellitus without complication, without long-term current use of insulin ([address] )  -     Ambulatory referral to Podiatry    Other orders  -     metFORMIN (GLUCOPHAGE) 500 mg tablet          Subjective:      Patient ID: Leticia Meraz is a 50 y o  male  Patient presents for screening colonoscopy  He has had no previous colonoscopy  He denies any family history of colon cancer  No constipation / diarrhea, no blood in his stool, abdominal pain or rectal pain  The following portions of the patient's history were reviewed and updated as appropriate: He  has a past medical history of Diabetes mellitus ([address] ), Glaucoma, and Hypertension  He   Patient Active Problem List    Diagnosis Date Noted    COVID-19 virus infection 01/20/2021    Encounter for screening colonoscopy 01/16/2021    Erectile dysfunction 01/16/2021    Establishing care with new doctor, encounter for 10/26/2020    BMI 35 0-35 9,adult 10/26/2020    Need for vaccination 10/26/2020    Type 2 diabetes mellitus without complication, without long-term current use of insulin ([address] ) 10/26/2020    Other hyperlipidemia 10/26/2020    Other fatigue 10/26/2020    Encounter for screening for HIV 10/26/2020     He  has a past surgical history that includes Hernia repair and Carpal tunnel release  His family history is not on file  He  reports that he has never smoked   He has never used smokeless tobacco  He reports that he does not drink alcohol or use drugs  Current Outpatient Medications   Medication Sig Dispense Refill    atorvastatin (LIPITOR) 10 mg tablet Take 1 tablet (10 mg total) by mouth daily 30 tablet 3    Blood Glucose Monitoring Suppl (OneTouch Verio) w/Device KIT Use daily 1 kit 0    glucose blood (OneTouch Verio) test strip Use as instructed 100 each 0    Lancets (OneTouch Delica Plus [id number]) MISC USE EVERY MORNING 100 each 1    latanoprost (XALATAN) 0 005 % ophthalmic solution Administer 1 drop to both eyes daily at bedtime 2 5 mL 3    lisinopril (ZESTRIL) 10 mg tablet Take 1 tablet (10 mg total) by mouth daily 30 tablet 3    metFORMIN (GLUCOPHAGE) 1000 MG tablet Take 1 tablet (1000 mg) by mouth 2 times a day with meals  60 tablet 2    albuterol (PROVENTIL HFA,VENTOLIN HFA) 90 mcg/act inhaler Inhale 1-2 puffs every 6 (six) hours as needed for wheezing (Patient not taking: Reported on 2/23/2021) 1 Inhaler 0    Ascorbic Acid (vitamin C) 1000 MG tablet Take 1 tablet (1,000 mg total) by mouth daily (Patient not taking: Reported on 2/12/2021) 14 tablet 0    cholecalciferol (VITAMIN D3) 1,000 units tablet Take 1 tablet (1,000 Units total) by mouth daily (Patient not taking: Reported on 2/12/2021) 14 tablet 0    COVID-19 Specimen Collection KIT TEST AS DIRECTED      Ergocalciferol (Vitamin D2) 10 MCG (400 UNIT) TABS Take 1 tablet (400 Units total) by mouth once a week (Patient not taking: Reported on 2/12/2021) 5 tablet 0    metFORMIN (GLUCOPHAGE) 500 mg tablet        No current facility-administered medications for this visit  He has No Known Allergies       Review of Systems   All other systems reviewed and are negative          Objective:      /88 (BP Location: Left arm, Patient Position: Sitting, Cuff Size: Large)   Pulse 79   Temp 97 9 °F (36 6 °C) (Tympanic)   Ht 5' 6" (1 676 m)   Wt 96 2 kg (212 lb)   BMI 34 22 kg/m²          Physical Exam  Vitals signs reviewed  Constitutional:       Appearance: Normal appearance  He is obese  HENT:      Head: Normocephalic and atraumatic  Eyes:      Conjunctiva/sclera: Conjunctivae normal       Pupils: Pupils are equal, round, and reactive to light  Neck:      Musculoskeletal: Normal range of motion and neck supple  Cardiovascular:      Rate and Rhythm: Normal rate and regular rhythm  Pulmonary:      Effort: Pulmonary effort is normal  No respiratory distress  Breath sounds: Normal breath sounds  Abdominal:      General: Abdomen is flat  There is no distension  Palpations: Abdomen is soft  Tenderness: There is no abdominal tenderness  Musculoskeletal: Normal range of motion  General: No swelling or tenderness  Skin:     General: Skin is warm and dry  Neurological:      General: No focal deficit present  Mental Status: He is alert and oriented to person, place, and time  Psychiatric:         Mood and Affect: Mood normal          Behavior: Behavior normal          Thought Content:  Thought content normal          Judgment: Judgment normal

## 2021-03-16 ENCOUNTER — ANESTHESIA EVENT (OUTPATIENT)
Dept: GASTROENTEROLOGY | Facility: HOSPITAL | Age: 56
End: 2021-03-16

## 2021-03-17 ENCOUNTER — HOSPITAL ENCOUNTER (OUTPATIENT)
Dept: GASTROENTEROLOGY | Facility: HOSPITAL | Age: 56
Setting detail: OUTPATIENT SURGERY
Discharge: HOME | End: 2021-03-17
Attending: SURGERY | Admitting: SURGERY
Payer: COMMERCIAL

## 2021-03-17 ENCOUNTER — ANESTHESIA (OUTPATIENT)
Dept: GASTROENTEROLOGY | Facility: HOSPITAL | Age: 56
End: 2021-03-17

## 2021-03-17 VITALS
HEIGHT: 66 IN | TEMPERATURE: 97 F | OXYGEN SATURATION: 99 % | DIASTOLIC BLOOD PRESSURE: 73 MMHG | HEART RATE: 66 BPM | WEIGHT: 212 LBS | BODY MASS INDEX: 34.07 KG/M2 | SYSTOLIC BLOOD PRESSURE: 139 MMHG | RESPIRATION RATE: 20 BRPM

## 2021-03-17 DIAGNOSIS — Z12.11 SCREENING FOR COLON CANCER: ICD-10-CM

## 2021-03-17 LAB — GLUCOSE SERPL-MCNC: 113 MG/DL (ref 65–140)

## 2021-03-17 PROCEDURE — 45380 COLONOSCOPY AND BIOPSY: CPT | Performed by: SURGERY

## 2021-03-17 PROCEDURE — 88305 TISSUE EXAM BY PATHOLOGIST: CPT | Performed by: PATHOLOGY

## 2021-03-17 PROCEDURE — 82948 REAGENT STRIP/BLOOD GLUCOSE: CPT

## 2021-03-17 RX ORDER — SODIUM CHLORIDE 9 MG/ML
50 INJECTION, SOLUTION INTRAVENOUS CONTINUOUS
Status: DISCONTINUED | OUTPATIENT
Start: 2021-03-17 | End: 2021-03-21 | Stop reason: HOSPADM

## 2021-03-17 RX ORDER — PROPOFOL 10 MG/ML
INJECTION, EMULSION INTRAVENOUS AS NEEDED
Status: DISCONTINUED | OUTPATIENT
Start: 2021-03-17 | End: 2021-03-17

## 2021-03-17 RX ADMIN — PROPOFOL 50 MG: 10 INJECTION, EMULSION INTRAVENOUS at 11:11

## 2021-03-17 RX ADMIN — PROPOFOL 20 MG: 10 INJECTION, EMULSION INTRAVENOUS at 11:25

## 2021-03-17 RX ADMIN — SODIUM CHLORIDE: 0.9 INJECTION, SOLUTION INTRAVENOUS at 10:54

## 2021-03-17 RX ADMIN — PROPOFOL 50 MG: 10 INJECTION, EMULSION INTRAVENOUS at 11:13

## 2021-03-17 RX ADMIN — PROPOFOL 50 MG: 10 INJECTION, EMULSION INTRAVENOUS at 11:22

## 2021-03-17 RX ADMIN — PROPOFOL 50 MG: 10 INJECTION, EMULSION INTRAVENOUS at 11:18

## 2021-03-17 NOTE — DISCHARGE INSTRUCTIONS
Colonoscopia   LO QUE NECESITA SABER:   Paola colonoscopia es un procedimiento para examinar con un endoscopio el interior de hines colon (intestino)  Cira paola colonoscopia, es posible que le retiren pólipos o crecimientos de tejidos  Es normal que se sienta inflamado o que tenga molestia abdominal  Usted debería estar expulsando los gases  Si tiene hemorroides o si le removieron pólipos, usted podría presentar paola pequeña cantidad de sangrado  [facility]IONES [facility]:   Llame a hines médico si:  · Usted presenta paola cantidad cristel de trevor glen brillante en eric evacuaciones intestinales  · Hines abdomen está nimo y firme y usted siente dolor intenso  · Usted tiene dificultad repentina para respirar  · Usted presenta sarpullido o urticaria  · Usted tiene fiebre dentro de las 24 horas después de hines procedimiento  · Usted no ha tenido paola evacuación intestinal después de 3 días de hines procedimiento  · Usted tiene preguntas o inquietudes acerca de hines condición o cuidado  Después de la colonoscopia:  · No levante nada, no se esfuerce o corra cira 3 días  · Descanse el mayor tiempo posible  A usted le ro administrado medicamento para relajarse  No maneje ni tome decisiones importantes por al menos 24 horas  Regrese a eric actividades normales según le indiquen  · [employer] gases y la incomodidad de la inflamación acostándose sobre hines lado vero con paola almohada térmica sobre hines abdomen  Es posible que necesite caminar un poco para ayudar a eliminar los gases  Coma comidas pequeñas hasta que se alivie de la inflamación  Si a usted le removieron pólipos: Por 7 días después de hines procedimiento:  · No  tome aspirina  · No  realice paseos largos en caleb  Ayude a prevenir el estreñimiento:  · Consuma alimentos saludables y variados   Los alimentos saludables incluyen fruta, vegetales, panes integrales, productos lácteos bajo en grasa, frijoles, mayur sin grasa, y pescado  Pregunte si necesita seguir paola dieta especial  Hines médico puede recomendarle que coma alimentos ricos en fibra, benjamin frijoles cocidos  La fibra lo ayuda a tener evacuaciones intestinales regulares  · [address] se le haya indicado  Los adultos deberían de beber entre 9 a 13 vasos de 8 onzas de líquidos cada día  Pregunte cuál es la cantidad [place] para usted  Para [place], los mejores líquidos son Juanell Can, y [place]  · Ejercítese según indicaciones  Consulte con hines médico acerca de cuál es el mejor régimen de ejercicio para usted  El ejercicio puede ayudar a prevenir estreñimiento, reducir hines presión arterial y [employer]  Acuda a eric consultas de control con hines médico según le indicaron  Anote eric preguntas para que se acuerde de hacerlas cira eric visitas  © Copyright [facility] [address] Information is for End User's use only and may not be sold, redistributed or otherwise used for commercial purposes  All illustrations and images included in CareNotes® are the copyrighted property of A D A Hark  or [address] [place] [address] es sólo para uso en educación  Hines intención no es darle un consejo médico sobre enfermedades o tratamientos  Colsulte con hines Maya Srinivasa farmacéutico antes de seguir cualquier régimen médico para saber si es seguro y efectivo para usted

## 2021-03-17 NOTE — ANESTHESIA POSTPROCEDURE EVALUATION
Post-Op Assessment Note    CV Status:  Stable  Pain Score: 0    Pain management: adequate     Mental Status:  Alert   Hydration Status:  Stable   PONV Controlled:  Controlled   Airway Patency:  Patent      Post Op Vitals Reviewed: Yes      Staff: CRNA         No complications documented      BP      Temp     Pulse     Resp      SpO2 100

## 2021-03-17 NOTE — INTERVAL H&P NOTE
H&P reviewed  After examining the patient I find no changes in the patients condition since the H&P had been written      Vitals:    03/17/21 1026   BP: 139/76   Pulse: 69   Resp: 20   Temp: (!) 97 °F (36 1 °C)   SpO2: 99%

## 2021-03-17 NOTE — ANESTHESIA PREPROCEDURE EVALUATION
Procedure:  COLONOSCOPY    Relevant Problems   ANESTHESIA (within normal limits)      CARDIO   (+) Other hyperlipidemia      ENDO   (+) Type 2 diabetes mellitus without complication, without long-term current use of insulin (HCC) (b sugar 113)      /RENAL (within normal limits)      HEMATOLOGY (within normal limits)      MUSCULOSKELETAL (within normal limits)      NEURO/PSYCH (within normal limits)      PULMONARY (within normal limits)      Other   (+) BMI 35 0-35 9,adult        Physical Exam    Airway    Mallampati score: II  TM Distance: >3 FB  Neck ROM: full     Dental       Cardiovascular  Cardiovascular exam normal    Pulmonary  Pulmonary exam normal     Other Findings        Anesthesia Plan  ASA Score- 2     Anesthesia Type- IV sedation with anesthesia with ASA Monitors  Additional Monitors:   Airway Plan:           Plan Factors-Exercise tolerance (METS): >4 METS  Chart reviewed  Existing labs reviewed  Patient is not a current smoker  Patient not instructed to abstain from smoking on day of procedure  Patient did not smoke on day of surgery  Induction-     Postoperative Plan-     Informed Consent- Anesthetic plan and risks discussed with patient  I personally reviewed this patient with the CRNA  Discussed and agreed on the Anesthesia Plan with the CRNA             Lab Results   Component Value Date    HGBA1C 9 3 (A) 01/15/2021       Lab Results   Component Value Date    K 3 8 10/26/2020     10/26/2020    CO2 31 10/26/2020    BUN 8 10/26/2020    CREATININE 0 89 10/26/2020    GLUF 147 (H) 10/26/2020    CALCIUM 9 3 10/26/2020    AST 48 (H) 02/22/2021    ALT 91 (H) 02/22/2021    ALKPHOS 109 02/22/2021    EGFR 98 10/26/2020       Lab Results   Component Value Date    WBC 5 67 10/26/2020    HGB 15 4 10/26/2020    HCT 44 4 10/26/2020    MCV 87 10/26/2020     (L) 10/26/2020

## 2021-03-17 NOTE — NURSING NOTE
Pt is awake,alert,tolerated diet, no complaints, OOB to BR, written and verbal instructions given, pt verbalizes an understanding and voices no questions or complaints

## 2021-03-17 NOTE — RESULT ENCOUNTER NOTE
Reviewed results  Please call patient and let him know a few polyps were found during colonoscopy, awaiting final biopsy result  For now, preliminary colonoscopy expected in 10 years, more to be formulated after pathology report  Thank you!

## 2021-04-04 DIAGNOSIS — E11.9 TYPE 2 DIABETES MELLITUS WITHOUT COMPLICATION, WITHOUT LONG-TERM CURRENT USE OF INSULIN (HCC): ICD-10-CM

## 2021-05-04 DIAGNOSIS — E11.9 TYPE 2 DIABETES MELLITUS WITHOUT COMPLICATION, WITHOUT LONG-TERM CURRENT USE OF INSULIN (HCC): ICD-10-CM

## 2021-05-04 RX ORDER — BLOOD SUGAR DIAGNOSTIC
STRIP MISCELLANEOUS
Qty: 100 STRIP | Refills: 3 | Status: SHIPPED | OUTPATIENT
Start: 2021-05-04

## 2021-05-30 DIAGNOSIS — E11.9 TYPE 2 DIABETES MELLITUS WITHOUT COMPLICATION, WITHOUT LONG-TERM CURRENT USE OF INSULIN (HCC): ICD-10-CM

## 2021-06-03 NOTE — PROGRESS NOTES
Assessment/Plan:    Type 2 diabetes mellitus without complication, without long-term current use of insulin (HCC)    Lab Results   Component Value Date    HGBA1C 9 3 (A) 01/15/2021     HbA1c goal of 7  Current A1c 9 3 -->6 6  Continue Metformin 1000 mg BID   Advised to brings BG logs to next visit   Continue with Lipitor 10 mg HS  Microalbuminuria most likely 2/2 to uncontrolled DMT2, continue with Lisinopril 20 mg QD  Adhere to diabetic diet and exercise  Will consider diabetic education if his BG are not controlled at the next visit  Annual ophtalmology and podiatry visits encouraged        Other hyperlipidemia  Continue for now with Lipitor 10 mg HS  Will obtain new lipid panel to guide therapy  Adhere to low fat diet and exercise     Varicose veins of left lower extremity  Asymptomatic, with bilateral, intermittent LE edema worse on the left  Discussed with patient to elevate legs, wear compression stockings at work  Stay well hydrated and eat a healthy diet  Avoid alcohol intake and caffeinated drinks  Chalazion of right upper eyelid  Instructed patient to use warm compresses with gentle massage over the involved area 4-5 times per day for 10-15 minutes  Reviewed this aids in resolving any ductal obstruction in the longer meibomian gland and helps drain sebum  Proper lid hygiene which included washing the affected eyelid with drops of baby shampoo   Reassurance provided antibiotic treatment is not warranted  Keeping the eyelid free of discharge, pus, or crusting helps to improve the condition  Ambulatory referral to ophthalmology for diabetic eye exam and regular check ups  Diagnoses and all orders for this visit:    Type 2 diabetes mellitus without complication, without long-term current use of insulin (HCC)  -     POCT hemoglobin A1c  -     Comprehensive metabolic panel; Future  -     Ambulatory referral to Ophthalmology;  Future  -     metFORMIN (GLUCOPHAGE) 1000 MG tablet; TAKE 1 TABLET(1000 MG) BY MOUTH TWICE DAILY WITH MEALS    Other hyperlipidemia  -     Lipid Panel with Direct LDL reflex; Future  -     atorvastatin (LIPITOR) 10 mg tablet; Take 1 tablet (10 mg total) by mouth daily    Chalazion of right upper eyelid    Medication refill  -     lisinopril (ZESTRIL) 10 mg tablet; Take 1 tablet (10 mg total) by mouth daily    Asymptomatic varicose veins of left lower extremity          Subjective:      Patient ID: Bry [place] is a 50 y o  male who presents today to the office for chronic conditions f/u visit  HPI     History of Present Illness:   Vicki Waterman is a 50 y o  M with PMH of type 2 diabetes without long term use of insulin  Denies complications of neuropathy, CKD, retinopathy, hx foot ulcer/PVD  Denies recent illness or hospitalizations  Denies recent severe hypoglycemic or severe hyperglycemic episodes  Compliant with current regimen  Home glucose monitoring: are performed regularly 1x per day  Home blood glucose readings:   Before breakfast: typically 122-173     Current regimen: Metformin 1000 mg BID    Last Eye Exam: >1 yr  Last Foot Exam: 2/22/21     Has hypertension: Taking Lisinopril 10 mg   Has hyperlipidemia: Taking Lipitor 10 mg     Current Concerns:    BLE edema  More prominent in the LLE  Patient mentions he stands on his feet at work, and notices the swelling at nighttime  Hx of varicose veins  No pain in the feet  He is not currently using compression stockings  Mentions he drinks enough water throughout the day  Does not drink or smoke  Denies dizziness, headache,vision changes, chest pain, sob,GI sx  Lump on eyelid  Location: R upper eyelid  Mentions he had similar lump on the left eyelid that resolved  Denies pain, discharge and vision disturbance       The following portions of the patient's history were reviewed and updated as appropriate: allergies, current medications, past family history, past medical history, past social history, past surgical history and problem list     Review of Systems   Constitutional: Negative for chills and fever  HENT: Negative for sore throat  Eyes: Negative for pain, discharge and visual disturbance  Right upper eyelid lump   Respiratory: Negative  Negative for cough and shortness of breath  Cardiovascular: Positive for leg swelling (left leg more than right)  Negative for chest pain  Gastrointestinal: Negative  Negative for abdominal pain, blood in stool, constipation, diarrhea, nausea and vomiting  Genitourinary: Negative  Negative for dysuria and hematuria  Musculoskeletal: Positive for arthralgias  Skin: Negative for rash  Neurological: Negative for dizziness and headaches  Psychiatric/Behavioral: The patient is not nervous/anxious  Objective:      /86 (BP Location: Left arm, Patient Position: Sitting, Cuff Size: Large)   Pulse 77   Temp 98 °F (36 7 °C) (Temporal)   Resp 16   Ht 5' 6" (1 676 m)   Wt 95 7 kg (211 lb)   SpO2 97%   BMI 34 06 kg/m²        Physical Exam  Vitals signs and nursing note reviewed  Constitutional:       General: He is not in acute distress  Appearance: Normal appearance  He is well-developed  He is obese  He is not ill-appearing, toxic-appearing or diaphoretic  HENT:      Head: Normocephalic and atraumatic  Nose: Nose normal    Eyes:      Extraocular Movements: Extraocular movements intact  Conjunctiva/sclera: Conjunctivae normal       Pupils: Pupils are equal, round, and reactive to light  Neck:      Musculoskeletal: Normal range of motion and neck supple  Cardiovascular:      Rate and Rhythm: Normal rate and regular rhythm  Heart sounds: Normal heart sounds  Pulmonary:      Effort: Pulmonary effort is normal  No respiratory distress  Breath sounds: Normal breath sounds  Abdominal:      General: Bowel sounds are normal       Palpations: Abdomen is soft  Tenderness:  There is no abdominal tenderness  Musculoskeletal: Normal range of motion  General: No tenderness  Right lower leg: Edema (trace) present  Left lower leg: Edema (+1 pitting edema, ankle level) present  Comments: Varicose veins of LLE   Skin:     General: Skin is warm  Findings: No rash  Neurological:      Mental Status: He is alert and oriented to person, place, and time  Psychiatric:         Mood and Affect: Mood normal          Behavior: Behavior normal          Thought Content:  Thought content normal          Judgment: Judgment normal

## 2021-06-03 NOTE — ASSESSMENT & PLAN NOTE
Lab Results   Component Value Date    HGBA1C 9 3 (A) 01/15/2021     HbA1c goal of 7  Current A1c 9 3 -->6 6  Continue Metformin 1000 mg BID   Advised to brings BG logs to next visit   Continue with Lipitor 10 mg HS  Microalbuminuria most likely 2/2 to uncontrolled DMT2, continue with Lisinopril 20 mg QD  Adhere to diabetic diet and exercise  Will consider diabetic education if his BG are not controlled at the next visit    Annual ophtalmology and podiatry visits encouraged

## 2021-06-04 ENCOUNTER — OFFICE VISIT (OUTPATIENT)
Dept: FAMILY MEDICINE CLINIC | Facility: CLINIC | Age: 56
End: 2021-06-04

## 2021-06-04 VITALS
WEIGHT: 211 LBS | OXYGEN SATURATION: 97 % | DIASTOLIC BLOOD PRESSURE: 86 MMHG | BODY MASS INDEX: 33.91 KG/M2 | TEMPERATURE: 98 F | SYSTOLIC BLOOD PRESSURE: 140 MMHG | RESPIRATION RATE: 16 BRPM | HEIGHT: 66 IN | HEART RATE: 77 BPM

## 2021-06-04 DIAGNOSIS — H00.11 CHALAZION OF RIGHT UPPER EYELID: ICD-10-CM

## 2021-06-04 DIAGNOSIS — I83.92 ASYMPTOMATIC VARICOSE VEINS OF LEFT LOWER EXTREMITY: ICD-10-CM

## 2021-06-04 DIAGNOSIS — Z76.0 MEDICATION REFILL: ICD-10-CM

## 2021-06-04 DIAGNOSIS — E78.49 OTHER HYPERLIPIDEMIA: ICD-10-CM

## 2021-06-04 DIAGNOSIS — E11.9 TYPE 2 DIABETES MELLITUS WITHOUT COMPLICATION, WITHOUT LONG-TERM CURRENT USE OF INSULIN (HCC): Primary | ICD-10-CM

## 2021-06-04 LAB — SL AMB POCT HEMOGLOBIN AIC: 6.6 (ref ?–6.5)

## 2021-06-04 PROCEDURE — 99213 OFFICE O/P EST LOW 20 MIN: CPT | Performed by: FAMILY MEDICINE

## 2021-06-04 PROCEDURE — 3044F HG A1C LEVEL LT 7.0%: CPT | Performed by: FAMILY MEDICINE

## 2021-06-04 PROCEDURE — 3725F SCREEN DEPRESSION PERFORMED: CPT | Performed by: FAMILY MEDICINE

## 2021-06-04 PROCEDURE — 4010F ACE/ARB THERAPY RXD/TAKEN: CPT | Performed by: FAMILY MEDICINE

## 2021-06-04 PROCEDURE — 3008F BODY MASS INDEX DOCD: CPT | Performed by: FAMILY MEDICINE

## 2021-06-04 PROCEDURE — 83036 HEMOGLOBIN GLYCOSYLATED A1C: CPT | Performed by: FAMILY MEDICINE

## 2021-06-04 PROCEDURE — 1036F TOBACCO NON-USER: CPT | Performed by: FAMILY MEDICINE

## 2021-06-04 RX ORDER — ATORVASTATIN CALCIUM 10 MG/1
10 TABLET, FILM COATED ORAL DAILY
Qty: 30 TABLET | Refills: 0 | Status: SHIPPED | OUTPATIENT
Start: 2021-06-04 | End: 2021-08-02

## 2021-06-04 RX ORDER — LISINOPRIL 10 MG/1
10 TABLET ORAL DAILY
Qty: 30 TABLET | Refills: 3 | Status: SHIPPED | OUTPATIENT
Start: 2021-06-04 | End: 2021-09-09 | Stop reason: SDUPTHER

## 2021-06-04 NOTE — ASSESSMENT & PLAN NOTE
Asymptomatic, with bilateral, intermittent LE edema worse on the left  Discussed with patient to elevate legs, wear compression stockings at work  Stay well hydrated and eat a healthy diet  Avoid alcohol intake and caffeinated drinks

## 2021-06-04 NOTE — ASSESSMENT & PLAN NOTE
Continue for now with Lipitor 10 mg HS  Will obtain new lipid panel to guide therapy     Adhere to low fat diet and exercise

## 2021-06-04 NOTE — PATIENT INSTRUCTIONS
Compra shoe insoles    Chalazión   LO QUE NECESITA SABER:   Un chalazión o quiste meibomiano es un bulto en el párpado  Rosa bulto se desarrolla porque paola glándula sebácea en el párpado es bloqueada  Un chalazión o quiste meibomiano puede ser pequeño y empezar a crecer poco a poco   INSTRUCCIONES [facility]:   Medicamentos:  · Antibióticos: Estos medicamentos pueden ser administrados para ayudarle a combatir las infecciones causadas por bacterias  Siempre tome eric antibióticos exactamente benjamin es indicado por hines médico  No deje de fawn hines medicamento a menos que se lo haya indicado hines médico  Nunca guarde antibióticos, ni tome antibióticos que fueron recetados para otra enfermedad  · AINEs (analgésicos antiinflamatorios no esteroides): Estos medicamentos disminuyen la inflamación, dolor y [place]  Los AINEs se pueden obtener sin receta médica  Pregunte cuál de estos medicamentos es apropiado para usted y qué dosis debería fawn  Tómelos benjamin se le indique  Cuando no se ele de la [employer], los medicamentos antiinflamatorios no esteroides pueden causar sangrado estomacal o problemas renales  · Geraldine eric medicamentos benjamin se le haya indicado  Consulte con hines médico si usted sasha que hines medicamento no le está ayudando o si presenta efectos secundarios  Infórmele si es alérgico a algún medicamento  Mantenga paola lista actualizada de los [place], las vitaminas y los productos herbales que shane  Incluya los siguientes datos de los medicamentos: cantidad, frecuencia y motivo de administración  Traiga con usted la lista o los envases de las píldoras a eric citas de seguimiento  Lleve la lista de los medicamentos con usted en woo de paola emergencia  Acuda a eric consultas de control con hines médico según le indicaron  Es posible que deba regresar para que revisen el randy  Anote eric preguntas para que se acuerde de hacerlas cira eric visitas    Formas en las que puede ayudar a disminuir la hinchazón y el dolor de hines párpado:  · Aplíquese poala compresa tibia: Moje un paño con agua tibia y póngalo sobre hines randy  [place] va a disminuir inflamación y el dolor  Hines médico le dirá la frecuencia con la que debe usar la compresa  · De un masaje a hines párpado: Si usted tuvo paola inyección de esteroides, suavemente de un masaje en vicente área  [place] ayudara a disminuir el dolor y la inflamación  Comuníquese con hines médico si:  · La hinchazón de hines párpado no disminuye con el tratamiento  · Usted ve o siente un nuevo bulto en hines párpado  · Usted siente presión detrás de los ojos  · Usted tiene preguntas o inquietudes acerca de hines condición o cuidado  Regrese a la neli de emergencias si:  · Usted tiene dificultad para  los ojos  · Hines párpado u randy Launie Pablo a sangrar  · Hines visión empeora repentinamente  · Hines párpado repentinamente se hincha más  © Copyright [facility] Information is for End User's use only and may not be sold, redistributed or otherwise used for commercial purposes  All illustrations and images included in CareNotes® are the copyrighted property of A D A Safello  or [address] [place] [address] es sólo para uso en educación  Hines intención no es darle un consejo médico sobre enfermedades o tratamientos  Colsulte con hines [place] farmacéutico antes de seguir cualquier régimen médico para saber si es seguro y efectivo para usted

## 2021-06-04 NOTE — ASSESSMENT & PLAN NOTE
Instructed patient to use warm compresses with gentle massage over the involved area 4-5 times per day for 10-15 minutes  Reviewed this aids in resolving any ductal obstruction in the longer meibomian gland and helps drain sebum  Proper lid hygiene which included washing the affected eyelid with drops of baby shampoo   Reassurance provided antibiotic treatment is not warranted  Keeping the eyelid free of discharge, pus, or crusting helps to improve the condition  Ambulatory referral to ophthalmology for diabetic eye exam and regular check ups

## 2021-06-12 ENCOUNTER — APPOINTMENT (OUTPATIENT)
Dept: LAB | Facility: HOSPITAL | Age: 56
End: 2021-06-12
Payer: COMMERCIAL

## 2021-06-12 DIAGNOSIS — E11.9 TYPE 2 DIABETES MELLITUS WITHOUT COMPLICATION, WITHOUT LONG-TERM CURRENT USE OF INSULIN (HCC): ICD-10-CM

## 2021-06-12 DIAGNOSIS — E78.49 OTHER HYPERLIPIDEMIA: ICD-10-CM

## 2021-06-12 LAB
ALBUMIN SERPL BCP-MCNC: 4.2 G/DL (ref 3–5.2)
ALP SERPL-CCNC: 99 U/L (ref 43–122)
ALT SERPL W P-5'-P-CCNC: 51 U/L
ANION GAP SERPL CALCULATED.3IONS-SCNC: 6 MMOL/L (ref 5–14)
AST SERPL W P-5'-P-CCNC: 45 U/L (ref 17–59)
BILIRUB SERPL-MCNC: 0.52 MG/DL
BUN SERPL-MCNC: 12 MG/DL (ref 5–25)
CALCIUM SERPL-MCNC: 9 MG/DL (ref 8.4–10.2)
CHLORIDE SERPL-SCNC: 104 MMOL/L (ref 97–108)
CHOLEST SERPL-MCNC: 117 MG/DL
CO2 SERPL-SCNC: 32 MMOL/L (ref 22–30)
CREAT SERPL-MCNC: 0.77 MG/DL (ref 0.7–1.5)
GFR SERPL CREATININE-BSD FRML MDRD: 104 ML/MIN/1.73SQ M
GLUCOSE P FAST SERPL-MCNC: 131 MG/DL (ref 70–99)
HDLC SERPL-MCNC: 36 MG/DL
LDLC SERPL CALC-MCNC: 61 MG/DL
POTASSIUM SERPL-SCNC: 4.3 MMOL/L (ref 3.6–5)
PROT SERPL-MCNC: 7.4 G/DL (ref 5.9–8.4)
SODIUM SERPL-SCNC: 142 MMOL/L (ref 137–147)
TRIGL SERPL-MCNC: 99 MG/DL

## 2021-06-12 PROCEDURE — 80061 LIPID PANEL: CPT

## 2021-06-12 PROCEDURE — 36415 COLL VENOUS BLD VENIPUNCTURE: CPT

## 2021-06-12 PROCEDURE — 80053 COMPREHEN METABOLIC PANEL: CPT

## 2021-08-31 ENCOUNTER — HOSPITAL ENCOUNTER (EMERGENCY)
Facility: HOSPITAL | Age: 56
Discharge: HOME | End: 2021-08-31
Attending: EMERGENCY MEDICINE | Admitting: EMERGENCY MEDICINE
Payer: COMMERCIAL

## 2021-08-31 VITALS
BODY MASS INDEX: 33.35 KG/M2 | DIASTOLIC BLOOD PRESSURE: 84 MMHG | HEART RATE: 77 BPM | RESPIRATION RATE: 14 BRPM | OXYGEN SATURATION: 100 % | WEIGHT: 206.6 LBS | TEMPERATURE: 97.5 F | SYSTOLIC BLOOD PRESSURE: 146 MMHG

## 2021-08-31 DIAGNOSIS — E86.0 DEHYDRATION: ICD-10-CM

## 2021-08-31 DIAGNOSIS — R74.01 TRANSAMINITIS: ICD-10-CM

## 2021-08-31 DIAGNOSIS — R42 DIZZINESS: Primary | ICD-10-CM

## 2021-08-31 LAB
ALBUMIN SERPL BCP-MCNC: 4.3 G/DL (ref 3–5.2)
ALP SERPL-CCNC: 112 U/L (ref 43–122)
ALT SERPL W P-5'-P-CCNC: 65 U/L
ANION GAP SERPL CALCULATED.3IONS-SCNC: 9 MMOL/L (ref 5–14)
APTT PPP: 27 SECONDS (ref 23–37)
AST SERPL W P-5'-P-CCNC: 62 U/L (ref 17–59)
BASOPHILS # BLD AUTO: 0 THOUSANDS/ΜL (ref 0–0.1)
BASOPHILS NFR BLD AUTO: 1 % (ref 0–1)
BILIRUB SERPL-MCNC: 0.45 MG/DL
BUN SERPL-MCNC: 11 MG/DL (ref 5–25)
CALCIUM SERPL-MCNC: 9.3 MG/DL (ref 8.4–10.2)
CHLORIDE SERPL-SCNC: 103 MMOL/L (ref 97–108)
CO2 SERPL-SCNC: 28 MMOL/L (ref 22–30)
CREAT SERPL-MCNC: 0.72 MG/DL (ref 0.7–1.5)
EOSINOPHIL # BLD AUTO: 0.1 THOUSAND/ΜL (ref 0–0.4)
EOSINOPHIL NFR BLD AUTO: 1 % (ref 0–6)
ERYTHROCYTE [DISTWIDTH] IN BLOOD BY AUTOMATED COUNT: 14.2 %
GFR SERPL CREATININE-BSD FRML MDRD: 106 ML/MIN/1.73SQ M
GLUCOSE SERPL-MCNC: 197 MG/DL (ref 70–99)
GLUCOSE SERPL-MCNC: 212 MG/DL (ref 65–140)
HCT VFR BLD AUTO: 42.8 % (ref 41–53)
HGB BLD-MCNC: 14.5 G/DL (ref 13.5–17.5)
INR PPP: 1.15 (ref 0.84–1.19)
LIPASE SERPL-CCNC: 142 U/L (ref 23–300)
LYMPHOCYTES # BLD AUTO: 1 THOUSANDS/ΜL (ref 0.5–4)
LYMPHOCYTES NFR BLD AUTO: 17 % (ref 25–45)
MAGNESIUM SERPL-MCNC: 1.8 MG/DL (ref 1.6–2.3)
MCH RBC QN AUTO: 29.9 PG (ref 26–34)
MCHC RBC AUTO-ENTMCNC: 33.8 G/DL (ref 31–36)
MCV RBC AUTO: 88 FL (ref 80–100)
MONOCYTES # BLD AUTO: 0.4 THOUSAND/ΜL (ref 0.2–0.9)
MONOCYTES NFR BLD AUTO: 7 % (ref 1–10)
NEUTROPHILS # BLD AUTO: 4.6 THOUSANDS/ΜL (ref 1.8–7.8)
NEUTS SEG NFR BLD AUTO: 75 % (ref 45–65)
NT-PROBNP SERPL-MCNC: 31.2 PG/ML (ref 0–299)
PHOSPHATE SERPL-MCNC: 2.5 MG/DL (ref 2.5–4.8)
PLATELET # BLD AUTO: 112 THOUSANDS/UL (ref 150–450)
PMV BLD AUTO: 9.9 FL (ref 8.9–12.7)
POTASSIUM SERPL-SCNC: 3.8 MMOL/L (ref 3.6–5)
PROT SERPL-MCNC: 7.2 G/DL (ref 5.9–8.4)
PROTHROMBIN TIME: 14.8 SECONDS (ref 11.6–14.5)
RBC # BLD AUTO: 4.85 MILLION/UL (ref 4.5–5.9)
SODIUM SERPL-SCNC: 140 MMOL/L (ref 137–147)
TROPONIN I SERPL-MCNC: <0.01 NG/ML (ref 0–0.03)
WBC # BLD AUTO: 6.2 THOUSAND/UL (ref 4.5–11)

## 2021-08-31 PROCEDURE — 36415 COLL VENOUS BLD VENIPUNCTURE: CPT | Performed by: EMERGENCY MEDICINE

## 2021-08-31 PROCEDURE — 83690 ASSAY OF LIPASE: CPT | Performed by: EMERGENCY MEDICINE

## 2021-08-31 PROCEDURE — 93005 ELECTROCARDIOGRAM TRACING: CPT

## 2021-08-31 PROCEDURE — 85610 PROTHROMBIN TIME: CPT | Performed by: EMERGENCY MEDICINE

## 2021-08-31 PROCEDURE — 85730 THROMBOPLASTIN TIME PARTIAL: CPT | Performed by: EMERGENCY MEDICINE

## 2021-08-31 PROCEDURE — 99282 EMERGENCY DEPT VISIT SF MDM: CPT | Performed by: EMERGENCY MEDICINE

## 2021-08-31 PROCEDURE — 85025 COMPLETE CBC W/AUTO DIFF WBC: CPT | Performed by: EMERGENCY MEDICINE

## 2021-08-31 PROCEDURE — 83880 ASSAY OF NATRIURETIC PEPTIDE: CPT | Performed by: EMERGENCY MEDICINE

## 2021-08-31 PROCEDURE — 84100 ASSAY OF PHOSPHORUS: CPT | Performed by: EMERGENCY MEDICINE

## 2021-08-31 PROCEDURE — 82948 REAGENT STRIP/BLOOD GLUCOSE: CPT

## 2021-08-31 PROCEDURE — 99284 EMERGENCY DEPT VISIT MOD MDM: CPT

## 2021-08-31 PROCEDURE — 83735 ASSAY OF MAGNESIUM: CPT | Performed by: EMERGENCY MEDICINE

## 2021-08-31 PROCEDURE — 96360 HYDRATION IV INFUSION INIT: CPT

## 2021-08-31 PROCEDURE — 84484 ASSAY OF TROPONIN QUANT: CPT | Performed by: EMERGENCY MEDICINE

## 2021-08-31 PROCEDURE — 80053 COMPREHEN METABOLIC PANEL: CPT | Performed by: EMERGENCY MEDICINE

## 2021-08-31 RX ADMIN — SODIUM CHLORIDE 1000 ML: 0.9 INJECTION, SOLUTION INTRAVENOUS at 17:43

## 2021-08-31 NOTE — Clinical Note
Adrianne Neetu was seen and treated in our emergency department on 8/31/2021  Diagnosis:     Delgado Gomez    He may return on this date: 09/01/2021         If you have any questions or concerns, please don't hesitate to call        Arnulfo Young DO    ______________________________           _______________          _______________  Hospital Representative                              Date                                Time

## 2021-09-01 LAB
ATRIAL RATE: 75 BPM
P AXIS: -22 DEGREES
PR INTERVAL: 130 MS
QRS AXIS: 55 DEGREES
QRSD INTERVAL: 88 MS
QT INTERVAL: 378 MS
QTC INTERVAL: 422 MS
T WAVE AXIS: 37 DEGREES
VENTRICULAR RATE: 75 BPM

## 2021-09-01 PROCEDURE — 93010 ELECTROCARDIOGRAM REPORT: CPT

## 2021-09-03 NOTE — ED PROVIDER NOTES
History  Chief Complaint   Patient presents with    Dizziness     Pt came to ER c/o dizziness, dyspnea, and blurred vision for 3-4 hours  Pt denies CP  History provided by:  Patient  Dizziness  Quality:  Lightheadedness  Severity:  Mild  Onset quality:  Gradual  Duration:  3 hours  Timing:  Intermittent  Progression:  Waxing and waning  Chronicity:  New  Context: physical activity and standing up    Relieved by:  Nothing  Worsened by:  Nothing  Ineffective treatments:  None tried  Associated symptoms: vision changes (Patient complains of some mild blurry vision that comes and goes associated with the dizziness, no visual field deficits, no headache )    Associated symptoms: no chest pain, no diarrhea, no headaches, no nausea, no shortness of breath and no weakness        Prior to Admission Medications   Prescriptions Last Dose Informant Patient Reported? Taking?    Blood Glucose Monitoring Suppl (OneTouch Verio) w/Device KIT  Self No No   Sig: Use daily   COVID-19 Specimen Collection KIT  Self Yes No   Sig: TEST AS DIRECTED   Lancets (OneTouch Delica Plus [id number]) MISC  Self No No   Sig: USE EVERY MORNING   atorvastatin (LIPITOR) 10 mg tablet   No No   Sig: TAKE 1 TABLET(10 MG) BY MOUTH DAILY   cholecalciferol (VITAMIN D3) 1,000 units tablet  Self No No   Sig: Take 1 tablet (1,000 Units total) by mouth daily   Patient not taking: Reported on 2/12/2021   glucose blood (OneTouch Verio) test strip   No No   Sig: USE TO CHECK BLOOD SUGAR EVERY MORNING   latanoprost (XALATAN) 0 005 % ophthalmic solution   No No   Sig: INSTILL 1 DROP IN BOTH EYES DAILY AT BEDTIME   lisinopril (ZESTRIL) 10 mg tablet   No No   Sig: Take 1 tablet (10 mg total) by mouth daily   metFORMIN (GLUCOPHAGE) 1000 MG tablet   No No   Sig: TAKE 1 TABLET(1000 MG) BY MOUTH TWICE DAILY WITH MEALS      Facility-Administered Medications: None       Past Medical History:   Diagnosis Date    Diabetes mellitus (HCC)     Glaucoma     Hypertension Past Surgical History:   Procedure Laterality Date    CARPAL TUNNEL RELEASE      HERNIA REPAIR         History reviewed  No pertinent family history  I have reviewed and agree with the history as documented  E-Cigarette/Vaping    E-Cigarette Use Never User      E-Cigarette/Vaping Substances    Nicotine No     THC No     CBD No     Flavoring No     Other No     Unknown No      Social History     Tobacco Use    Smoking status: Never Smoker    Smokeless tobacco: Never Used   Vaping Use    Vaping Use: Never used   Substance Use Topics    Alcohol use: Never    Drug use: Never       Review of Systems   Constitutional: Negative for chills and fever  HENT: Negative for rhinorrhea, sore throat and trouble swallowing  Eyes: Negative for pain  Respiratory: Negative for cough, shortness of breath, wheezing and stridor  Cardiovascular: Negative for chest pain and leg swelling  Gastrointestinal: Negative for abdominal pain, diarrhea and nausea  Endocrine: Negative for polyuria  Genitourinary: Negative for dysuria, flank pain and urgency  Musculoskeletal: Negative for joint swelling, myalgias and neck stiffness  Skin: Negative for rash  Allergic/Immunologic: Negative for immunocompromised state  Neurological: Positive for dizziness  Negative for syncope, weakness, numbness and headaches  Psychiatric/Behavioral: Negative for confusion and suicidal ideas  All other systems reviewed and are negative  Physical Exam  Physical Exam  Vitals and nursing note reviewed  Constitutional:       Appearance: Normal appearance  He is well-developed  HENT:      Head: Normocephalic and atraumatic  Nose: Nose normal       Mouth/Throat:      Mouth: Mucous membranes are moist    Eyes:      Extraocular Movements: Extraocular movements intact  Pupils: Pupils are equal, round, and reactive to light  Cardiovascular:      Rate and Rhythm: Normal rate and regular rhythm        Heart sounds: No murmur heard  No friction rub  Pulmonary:      Effort: No respiratory distress  Breath sounds: Normal breath sounds  No wheezing or rales  Abdominal:      General: Bowel sounds are normal  There is no distension  Palpations: Abdomen is soft  Tenderness: There is no abdominal tenderness  Musculoskeletal:         General: No tenderness  Normal range of motion  Cervical back: Normal range of motion and neck supple  Skin:     General: Skin is warm  Findings: No rash  Neurological:      Mental Status: He is alert and oriented to person, place, and time  Comments: GCS 15  AAOx4  No focal neuro deficits  CN II-XII intact  PERRL  EOMI  Dolores Cutting No pronator drift   strength 5/5 bilaterally  B/L UE strength 5/5 throughout  Finger to nose normal  Cerebellar function normal  Ambulates without difficulty  B/L LE strength 5/5 throughout  Gross sensation to b/l upper and lower extremities intact         Psychiatric:         Mood and Affect: Mood normal          Vital Signs  ED Triage Vitals [08/31/21 1710]   Temperature Pulse Respirations Blood Pressure SpO2   97 5 °F (36 4 °C) 83 20 155/78 98 %      Temp Source Heart Rate Source Patient Position - Orthostatic VS BP Location FiO2 (%)   Tympanic Monitor Sitting Left arm --      Pain Score       --           Vitals:    08/31/21 1710 08/31/21 1800 08/31/21 1830   BP: 155/78 134/82 146/84   Pulse: 83 72 77   Patient Position - Orthostatic VS: Sitting Sitting Sitting         Visual Acuity      ED Medications  Medications   sodium chloride 0 9 % bolus 1,000 mL (0 mL Intravenous Stopped 8/31/21 1845)       Diagnostic Studies  Results Reviewed     Procedure Component Value Units Date/Time    Troponin I [[id number]]  (Normal) Collected: 08/31/21 1743    Lab Status: Final result Specimen: Blood from Arm, Right Updated: 08/31/21 1814     Troponin I <0 01 ng/mL     NT-BNP PRO [[id number]]  (Normal) Collected: 08/31/21 1743    Lab Status: Final result Specimen: Blood from Arm, Right Updated: 08/31/21 1811     NT-proBNP 31 2 pg/mL     Phosphorus [[id number]]  (Normal) Collected: 08/31/21 1743    Lab Status: Final result Specimen: Blood from Arm, Right Updated: 08/31/21 1803     Phosphorus 2 5 mg/dL     Magnesium [[id number]]  (Normal) Collected: 08/31/21 1743    Lab Status: Final result Specimen: Blood from Arm, Right Updated: 08/31/21 1803     Magnesium 1 8 mg/dL     Lipase [[id number]]  (Normal) Collected: 08/31/21 1743    Lab Status: Final result Specimen: Blood from Arm, Right Updated: 08/31/21 1803     Lipase 142 u/L     Comprehensive metabolic panel [[id number]]  (Abnormal) Collected: 08/31/21 1743    Lab Status: Final result Specimen: Blood from Arm, Right Updated: 08/31/21 1803     Sodium 140 mmol/L      Potassium 3 8 mmol/L      Chloride 103 mmol/L      CO2 28 mmol/L      ANION GAP 9 mmol/L      BUN 11 mg/dL      Creatinine 0 72 mg/dL      Glucose 197 mg/dL      Calcium 9 3 mg/dL      AST 62 U/L      ALT 65 U/L      Alkaline Phosphatase 112 U/L      Total Protein 7 2 g/dL      Albumin 4 3 g/dL      Total Bilirubin 0 45 mg/dL      eGFR 106 ml/min/1 73sq m     Narrative:      [place] guidelines for Chronic Kidney Disease (CKD):     Stage 1 with normal or high GFR (GFR > 90 mL/min/1 73 square meters)    Stage 2 Mild CKD (GFR = 60-89 mL/min/1 73 square meters)    Stage 3A Moderate CKD (GFR = 45-59 mL/min/1 73 square meters)    Stage 3B Moderate CKD (GFR = 30-44 mL/min/1 73 square meters)    Stage 4 Severe CKD (GFR = 15-29 mL/min/1 73 square meters)    Stage 5 End Stage CKD (GFR <15 mL/min/1 73 square meters)  Note: GFR calculation is accurate only with a steady state creatinine    Protime-INR [[id number]]  (Abnormal) Collected: 08/31/21 1743    Lab Status: Final result Specimen: Blood from Arm, Right Updated: 08/31/21 1802     Protime 14 8 seconds      INR 1 15    APTT [[id number]]  (Normal) Collected: 08/31/21 1743    Lab Status: Final result Specimen: Blood from Arm, Right Updated: 08/31/21 1802     PTT 27 seconds     CBC and differential [[id number]]  (Abnormal) Collected: 08/31/21 1743    Lab Status: Final result Specimen: Blood from Arm, Right Updated: 08/31/21 1753     WBC 6 20 Thousand/uL      RBC 4 85 Million/uL      Hemoglobin 14 5 g/dL      Hematocrit 42 8 %      MCV 88 fL      MCH 29 9 pg      MCHC 33 8 g/dL      RDW 14 2 %      MPV 9 9 fL      Platelets 445 Thousands/uL      Neutrophils Relative 75 %      Lymphocytes Relative 17 %      Monocytes Relative 7 %      Eosinophils Relative 1 %      Basophils Relative 1 %      Neutrophils Absolute 4 60 Thousands/µL      Lymphocytes Absolute 1 00 Thousands/µL      Monocytes Absolute 0 40 Thousand/µL      Eosinophils Absolute 0 10 Thousand/µL      Basophils Absolute 0 00 Thousands/µL     Fingerstick Glucose (POCT) [[id number]]  (Abnormal) Collected: 08/31/21 1731    Lab Status: Final result Updated: 08/31/21 1732     POC Glucose 212 mg/dl                  No orders to display              Procedures  ECG 12 Lead Documentation Only    Date/Time: 8/31/2021 7:36 AM  Performed by: Caitlyn Bonilla DO  Authorized by: Caitlyn Bonilla DO     ECG reviewed by me, the ED Provider: yes    Patient location:  ED  Previous ECG:     Previous ECG:  Compared to current    Similarity:  No change  Interpretation:     Interpretation: normal    Rate:     ECG rate assessment: normal    Rhythm:     Rhythm: sinus rhythm    Ectopy:     Ectopy: none    QRS:     QRS axis:  Normal    QRS intervals:  Normal  Conduction:     Conduction: normal    ST segments:     ST segments:  Normal  T waves:     T waves: normal               ED Course  ED Course as of Sep 03 0714   Tue Aug 31, 2021   1816 Labs are unremarkable at this point time  Some noted mild transaminitis which is consistent with prior labs  Will re-evaluate the patient for symptomatic dizziness                                  SBIRT 22yo+      Most Recent Value   SBIRT (22 yo +)   In order to provide better care to our patients, we are screening all of our patients for alcohol and drug use  Would it be okay to ask you these screening questions? Yes Filed at: 08/31/2021 1713   Initial Alcohol Screen: US AUDIT-C    1  How often do you have a drink containing alcohol?  0 Filed at: 08/31/2021 1713   2  How many drinks containing alcohol do you have on a typical day you are drinking? 0 Filed at: 08/31/2021 1713   3a  Male UNDER 65: How often do you have five or more drinks on one occasion? 0 Filed at: 08/31/2021 1713   Audit-C Score  0 Filed at: 08/31/2021 1713   CULLEN: How many times in the past year have you    Used an illegal drug or used a prescription medication for non-medical reasons? Never Filed at: 08/31/2021 1713                    MDM  Number of Diagnoses or Management Options  Dehydration: new and requires workup  Dizziness: new and requires workup  Transaminitis: new and requires workup  Diagnosis management comments: 61-year-old male presents emergency department with noted lightheadedness symptoms and has been present for several hours duration  Patient denies headache denies unilateral muscle fatigue, normal neurological exam on my examination  Patient does admit to significant low p o  Intake with liquids over last 24-48 hours  In the emergency department this point time fluids were given patient was placed on the monitor there is noted transaminitis on his blood work  Otherwise unremarkable examination and no indication for imaging at this point time  Plan will be for outpatient management and follow-up in feels much improved after a L fluids in the department  Pt re-examined and evaluated after testing and treatment  Spoke with the patient and feeling improved and sxs have resolved  Will discharge home with close f/u with pcp and instructed to return to the ED if sxs worsen or continue   Pt agrees with the plan for discharge and feels comfortable to go home with proper f/u  Advised to return for worsening or additional problems  Diagnostic tests were reviewed and questions answered  Diagnosis, care plan and treatment options were discussed  The patient understand instructions and will follow up as directed  Counseling: I had a detailed discussion with the patient and/or guardian regarding: the historical points, exam findings, and any diagnostic results supporting the discharge diagnosis, lab results, radiology results, discharge instructions reviewed with patient and/or family/caregiver and understanding was verbalized  Instructions given to return to the emergency department if symptoms worsen or persist, or if there are any questions or concerns that arise at home  All labs reviewed and utilized in the medical decision making process    All radiology studies independently viewed by me and interpreted by the radiologist        Amount and/or Complexity of Data Reviewed  Clinical lab tests: ordered and reviewed  Decide to obtain previous medical records or to obtain history from someone other than the patient: yes  Review and summarize past medical records: yes  Independent visualization of images, tracings, or specimens: yes        Disposition  Final diagnoses:   Dizziness   Transaminitis   Dehydration     Time reflects when diagnosis was documented in both MDM as applicable and the Disposition within this note     Time User Action Codes Description Comment    8/31/2021  6:28 PM Birtha Carloses Add [R42] Dizziness     8/31/2021  6:28 PM Birtha Carloses Add [R74 01] Transaminitis     8/31/2021  6:28 PM Birtha Coy Add [E86 0] Dehydration       ED Disposition     ED Disposition Condition Date/Time Comment    Discharge Stable Tue Aug 31, 2021  6:28 PM Nicolás Daniels discharge to home/self care              Follow-up Information    None         Discharge Medication List as of 8/31/2021  6:29 PM      CONTINUE these medications which have NOT CHANGED    Details   atorvastatin (LIPITOR) 10 mg tablet TAKE 1 TABLET(10 MG) BY MOUTH DAILY, Normal      Blood Glucose Monitoring Suppl (OneTouch Verio) w/Device KIT Use daily, Starting Fri 1/15/2021, Normal      cholecalciferol (VITAMIN D3) 1,000 units tablet Take 1 tablet (1,000 Units total) by mouth daily, Starting Tue 12/15/2020, Normal      COVID-19 Specimen Collection KIT TEST AS DIRECTED, Historical Med      glucose blood (OneTouch Verio) test strip USE TO CHECK BLOOD SUGAR EVERY MORNING, Normal      Lancets (OneTouch Delica Plus [id number]) MISC USE EVERY MORNING, Normal      latanoprost (XALATAN) 0 005 % ophthalmic solution INSTILL 1 DROP IN BOTH EYES DAILY AT BEDTIME, Normal      lisinopril (ZESTRIL) 10 mg tablet Take 1 tablet (10 mg total) by mouth daily, Starting Fri 6/4/2021, Normal      metFORMIN (GLUCOPHAGE) 1000 MG tablet TAKE 1 TABLET(1000 MG) BY MOUTH TWICE DAILY WITH MEALS, Normal           No discharge procedures on file      PDMP Review     None          ED Provider  Electronically Signed by           Sree Martinez DO  09/03/21 [id number]

## 2021-09-08 NOTE — PROGRESS NOTES
[address] [place] [address]  Annual Well Adult Visit  Assessment/Plan     Encounter for Annual Physical Exam  Immunizations and labs reviewed with patient  HIV (10/26/20) wnl  Hep C Ab testing per USPSTF guidelines  Lifestyle changes with healthy diet and exercise     Type 2 Diabetes Mellitus without long term use of insulin  HbA1c goal of 6 5-7  Current A1c 6 6-->6 8  Continue Metformin 1000 mg BID   Advised to brings BG logs to next visit   Continue Lipitor 10 mg HS  Adhere to diabetic diet and exercise  Will consider diabetic education if his BG are not controlled at the next visit  Annual ophtalmology and podiatry visits encouraged  History of Hepatitis C   Chronic, treated with injectable pharmacotherapy in 2004 in [place]   Patient mentions he underwent liver biopsy   Denies RUQ pain  Denies alcohol and illicit drug use   Will obtain Hep C Ab, Hep C viral load, ferritin and liver US  I suspect this may be JOHNSTON however will rule out hemochromatosis, and active liver disease   Discussed with patient to loose weight in order to prevent hepatic steatosis     Transaminitis  Recent CMP on 08/31/21 (AST 62, ALT 65); AST/ALT ratio <1 meaning it's most likely JOHNSTON versus alcoholic liver disease where AST/ALT ratio >2  Repeat CMP in 3 months to ensure LFTs remain stable     1  Encounter for male adult physical exam   2  Patient Counseling:   · Nutrition: Stressed importance of a well balanced diet, moderation of sodium/saturated fat, caloric balance and sufficient intake of fiber  · Exercise: Stressed the importance of regular exercise with a goal of 150 minutes per week  · Dental Health: Discussed daily flossing and brushing and regular dental visits   · Alcohol Use:  Recommended moderation of alcohol intake    · Immunizations reviewed  · Discussed benefits of screening  · Discussed the patient's BMI with him  The BMI is above average; BMI management plan is completed  3  Cancer Screening  4  Labs: reviewed with patient  5  Follow up in one year  Subjective     Briscoe Libman is a 49 y o   male and is here for routine health maintenance  The patient reports he recently had blood work done which showed elevated liver enzymes  He reports hx of Hep C treated with injectable therapy in 2004 in [place]  He went to a conference and believes he heard there is a Hep C vaccine  He is intested in getting the vaccine  Patient denies RUQ pain and GI sx  Since he was diagnosed with Hep C he quit drinking  Denies illicit drug use  History of Present Illness     HPI    Well Adult Physical   Patient here for a comprehensive physical exam     Diet and Physical Activity  Diet: well balanced diet, low fat diet and low carbohydrate diet  Weight concerns: Patient has class 1 obesity (BMI 30-34  9)  Exercise: infrequently      Depression Screen  PHQ-9 Depression Screening    PHQ-9:   Frequency of the following problems over the past two weeks:              General Health  Hearing: Normal:  bilateral  Vision: most recent eye exam >1 year and wears glasses  Dental: no dental visits for >1 year, brushes teeth twice daily and flosses teeth occasionally    Cancer Screening  Colonoscopy: 03/17/2021-->repeat in 10 yrs   PSA: no indication to order PSA at this time, age less than 54 without sx  No family hx of prostate cancer  Smoker: N/A  Annual screening with low-dose helical computed tomography (CT) for patients age 54 to 76 years with history of smoking at least 30 pack-years and, if a former smoker, had quit within the previous 15 years      The following portions of the patient's history were reviewed and updated as appropriate: allergies, current medications, past family history, past medical history, past social history, past surgical history and problem list     Review of Systems     Review of Systems   Constitutional: Negative for chills and fever  HENT: Negative for sore throat      Eyes: Negative for visual disturbance  Respiratory: Negative  Negative for cough and shortness of breath  Cardiovascular: Negative for chest pain and leg swelling  Gastrointestinal: Negative  Negative for abdominal pain, blood in stool, constipation, diarrhea, nausea and vomiting  Genitourinary: Negative  Negative for dysuria and hematuria  Skin: Negative for rash  Neurological: Negative for dizziness and headaches  Psychiatric/Behavioral: The patient is not nervous/anxious  Past Medical History     Past Medical History:   Diagnosis Date    Diabetes mellitus ([facility] [address] )     Glaucoma     Hypertension        Past Surgical History     Past Surgical History:   Procedure Laterality Date    CARPAL TUNNEL RELEASE      HERNIA REPAIR         Social History     Social History     Socioeconomic History    Marital status: /Civil Union     Spouse name: None    Number of children: None    Years of education: None    Highest education level: None   Occupational History    None   Tobacco Use    Smoking status: Never Smoker    Smokeless tobacco: Never Used   Vaping Use    Vaping Use: Never used   Substance and Sexual Activity    Alcohol use: Never    Drug use: Never    Sexual activity: Yes     Partners: Female   Other Topics Concern    None   Social History Narrative    None     Social Determinants of Health     Financial Resource Strain:     Difficulty of Paying Living Expenses:    Food Insecurity:     Worried About Running Out of Food in the Last Year:     Ran Out of Food in the Last Year:    Transportation Needs:     Lack of Transportation (Medical):      Lack of Transportation (Non-Medical):    Physical Activity:     Days of Exercise per Week:     Minutes of Exercise per Session:    Stress:     Feeling of Stress :    Social Connections:     Frequency of Communication with Friends and Family:     Frequency of Social Gatherings with Friends and Family:     Attends Tenriism Services:     Active Member of Clubs or Organizations:     Attends Club or Organization Meetings:     Marital Status:    Intimate Partner Violence:     Fear of Current or Ex-Partner:     Emotionally Abused:     Physically Abused:     Sexually Abused:        Family History     No family history on file  Current Medications       Current Outpatient Medications:     atorvastatin (LIPITOR) 10 mg tablet, TAKE 1 TABLET(10 MG) BY MOUTH DAILY, Disp: 30 tablet, Rfl: 3    Blood Glucose Monitoring Suppl (OneTouch Verio) w/Device KIT, Use daily, Disp: 1 kit, Rfl: 0    glucose blood (OneTouch Verio) test strip, USE TO CHECK BLOOD SUGAR EVERY MORNING, Disp: 100 strip, Rfl: 3    Lancets (OneTouch Delica Plus [id number]) MISC, USE EVERY MORNING, Disp: 100 each, Rfl: 1    latanoprost (XALATAN) 0 005 % ophthalmic solution, INSTILL 1 DROP IN BOTH EYES DAILY AT BEDTIME, Disp: 2 5 mL, Rfl: 3    lisinopril (ZESTRIL) 10 mg tablet, Take 1 tablet (10 mg total) by mouth daily, Disp: 30 tablet, Rfl: 3    metFORMIN (GLUCOPHAGE) 1000 MG tablet, TAKE 1 TABLET(1000 MG) BY MOUTH TWICE DAILY WITH MEALS, Disp: 60 tablet, Rfl: 3    cholecalciferol (VITAMIN D3) 1,000 units tablet, Take 1 tablet (1,000 Units total) by mouth daily (Patient not taking: Reported on 2/12/2021), Disp: 14 tablet, Rfl: 0    COVID-19 Specimen Collection KIT, TEST AS DIRECTED, Disp: , Rfl:      Allergies     No Known Allergies    Objective     /82 (BP Location: Left arm, Patient Position: Sitting, Cuff Size: Adult)   Pulse 97   Temp (!) 97 2 °F (36 2 °C) (Temporal)   Resp 18   Wt 92 5 kg (204 lb)   SpO2 97%   BMI 32 93 kg/m²      Physical Exam  Vitals and nursing note reviewed  Constitutional:       General: He is not in acute distress  Appearance: Normal appearance  He is well-developed  He is obese  He is not ill-appearing, toxic-appearing or diaphoretic  HENT:      Head: Normocephalic and atraumatic        Nose: Nose normal    Eyes:      General:         Right eye: No discharge  Left eye: No discharge  Conjunctiva/sclera: Conjunctivae normal    Cardiovascular:      Rate and Rhythm: Normal rate and regular rhythm  Heart sounds: Normal heart sounds  Pulmonary:      Effort: Pulmonary effort is normal  No respiratory distress  Breath sounds: Normal breath sounds  Abdominal:      General: Bowel sounds are normal       Palpations: Abdomen is soft  Tenderness: There is no abdominal tenderness  Musculoskeletal:         General: Normal range of motion  Cervical back: Normal range of motion and neck supple  Right lower leg: No edema  Left lower leg: No edema  Skin:     General: Skin is warm  Findings: No rash  Neurological:      Mental Status: He is alert and oriented to person, place, and time  Psychiatric:         Mood and Affect: Mood normal          Behavior: Behavior normal          Thought Content:  Thought content normal          Judgment: Judgment normal            No exam data present    Health Maintenance     Health Maintenance   Topic Date Due    Hepatitis C Screening  Never done    DM Eye Exam  Never done    COVID-19 Vaccine (1) Never done    Annual Physical  Never done    Influenza Vaccine (1) 09/01/2021    BMI: Followup Plan  01/15/2022    Diabetic Foot Exam  01/16/2022    HEMOGLOBIN A1C  03/09/2022    Depression Screening PHQ  06/04/2022    BMI: Adult  08/31/2022    DTaP,Tdap,and Td Vaccines (2 - Td or Tdap) 10/26/2030    Colorectal Cancer Screening  03/17/2031    Pneumococcal Vaccine: Pediatrics (0 to 5 Years) and At-Risk Patients (6 to 59 Years) (2 of 2 - PPSV23) 07/04/2032    HIV Screening  Completed    HIB Vaccine  Aged Out    Hepatitis B Vaccine  Aged Out    IPV Vaccine  Aged Out    Hepatitis A Vaccine  Aged Out    Meningococcal ACWY Vaccine  Aged Out    HPV Vaccine  Aged [employer] History   Administered Date(s) Administered    Influenza, recombinant, quadrivalent,injectable, preservative free 10/26/2020    Pneumococcal Polysaccharide PPV23 01/15/2021    Tdap 10/26/2020         Radha Love MD

## 2021-09-09 ENCOUNTER — OFFICE VISIT (OUTPATIENT)
Dept: FAMILY MEDICINE CLINIC | Facility: CLINIC | Age: 56
End: 2021-09-09

## 2021-09-09 ENCOUNTER — APPOINTMENT (OUTPATIENT)
Dept: LAB | Facility: HOSPITAL | Age: 56
End: 2021-09-09
Payer: COMMERCIAL

## 2021-09-09 VITALS
DIASTOLIC BLOOD PRESSURE: 82 MMHG | BODY MASS INDEX: 32.93 KG/M2 | RESPIRATION RATE: 18 BRPM | SYSTOLIC BLOOD PRESSURE: 130 MMHG | TEMPERATURE: 97.2 F | HEART RATE: 97 BPM | OXYGEN SATURATION: 97 % | WEIGHT: 204 LBS

## 2021-09-09 DIAGNOSIS — R74.01 TRANSAMINITIS: ICD-10-CM

## 2021-09-09 DIAGNOSIS — Z86.19 HISTORY OF HEPATITIS C: ICD-10-CM

## 2021-09-09 DIAGNOSIS — Z76.0 MEDICATION REFILL: ICD-10-CM

## 2021-09-09 DIAGNOSIS — E11.9 TYPE 2 DIABETES MELLITUS WITHOUT COMPLICATION, WITHOUT LONG-TERM CURRENT USE OF INSULIN (HCC): ICD-10-CM

## 2021-09-09 DIAGNOSIS — Z00.00 ENCOUNTER FOR ANNUAL PHYSICAL EXAM: Primary | ICD-10-CM

## 2021-09-09 DIAGNOSIS — E78.49 OTHER HYPERLIPIDEMIA: ICD-10-CM

## 2021-09-09 LAB
FERRITIN SERPL-MCNC: 89 NG/ML (ref 8–388)
SL AMB POCT HEMOGLOBIN AIC: 6.8 (ref ?–6.5)

## 2021-09-09 PROCEDURE — 87522 HEPATITIS C REVRS TRNSCRPJ: CPT

## 2021-09-09 PROCEDURE — 99396 PREV VISIT EST AGE 40-64: CPT | Performed by: FAMILY MEDICINE

## 2021-09-09 PROCEDURE — 82728 ASSAY OF FERRITIN: CPT

## 2021-09-09 PROCEDURE — 36415 COLL VENOUS BLD VENIPUNCTURE: CPT

## 2021-09-09 PROCEDURE — 83036 HEMOGLOBIN GLYCOSYLATED A1C: CPT | Performed by: FAMILY MEDICINE

## 2021-09-09 PROCEDURE — 86803 HEPATITIS C AB TEST: CPT

## 2021-09-09 RX ORDER — ATORVASTATIN CALCIUM 10 MG/1
10 TABLET, FILM COATED ORAL DAILY
Qty: 30 TABLET | Refills: 3 | Status: SHIPPED | OUTPATIENT
Start: 2021-09-09 | End: 2022-02-10

## 2021-09-09 RX ORDER — LATANOPROST 50 UG/ML
1 SOLUTION/ DROPS OPHTHALMIC DAILY
Qty: 2.5 ML | Refills: 3 | Status: SHIPPED | OUTPATIENT
Start: 2021-09-09

## 2021-09-09 RX ORDER — MELATONIN
1000 DAILY
Qty: 30 TABLET | Refills: 3 | Status: SHIPPED | OUTPATIENT
Start: 2021-09-09 | End: 2021-12-09 | Stop reason: SDUPTHER

## 2021-09-09 RX ORDER — LISINOPRIL 10 MG/1
10 TABLET ORAL DAILY
Qty: 30 TABLET | Refills: 3 | Status: SHIPPED | OUTPATIENT
Start: 2021-09-09 | End: 2021-12-09 | Stop reason: SDUPTHER

## 2021-09-09 NOTE — PATIENT INSTRUCTIONS
Conteo básico de carbohidratos   CUIDADO AMBULATORIO:   El conteo de carbohidratos es [place]elia de planificar eric comidas contando la cantidad de carbohidratos de los alimentos  Donnamaria Medic[employer] son los azúcares, almidones y fibras que se encuentran en las frutas, granos, verduras y productos lácteos  Los carbohidratos [employer] niveles de azúcar en la trevor  El conteo de carbohidratos puede ayudarle a comer la cantidad [place] de carbohidratos para mantener eric niveles de glucosa (azúcar) en trevor bajo control  Lo que necesita saber sobre la planificación de las comidas utilizando el conteo de carbohidratos:  · Un dietista o un médico le ayudará a desarrollar un plan alimenticio saludable que trabajará mejor para usted  Le enseñarán cuántos carbohidratos debe consumir o beber en cada comida o merienda  Hines plan alimenticio estará basado en hines edad, peso, dieta usual y [facility]  Si usted tiene diabetes, también incluirá hines nivel de azúcar en trevor y medicamentos para la diabetes  Cuando usted está informado de la cantidad de carbohidratos que debe consumir, entonces puede decidir los tipos de alimentos que quiere comer  · Necesitará saber qué alimentos contienen carbohidratos y cuántos contienen  Lleva la cuenta de la cantidad de carbohidratos en alimentos y meriendas para poder seguir hines plan alimenticio  No evite los carbohidratos ni omita comidas  Si usted no consume suficiente cantidad de carbohidratos u omite comidas, los niveles de azúcar en hines trevor pueden bajar excesivamente  Alimentos que contienen carbohidratos:  · Panes: Cada porción de comida en la lista siguiente contiene cerca de 15 g de carbohidratos     ? 1 rebanada de pan (1 onza) o 1 tortilla de harina o maíz (6 pulgadas)    ? La ½ de pan para hamburguesa o ¼ de paola rosquilla cristel (aproximadamente 1 onza)    ?  1 panqueque (4 pulgadas en diámetro y ¼ de [place])    · Cereales y granos: [address] porciones de cereales listos para comer puede variar  Syed el tamaño de la porción y la cantidad de carbohidratos alistados en la etiqueta alimenticia  Cada porción de comida en la lista siguiente contiene cerca de 15 g de carbohidratos     ? ¾ taza de cereal seco, sin endulzar, listo para comer o ¼ taza de granola baja en grasa    ? ½ taza de jakub u otros cereales cocidos    ? ? taza de arroz o pasta cocida    · Frijoles y vegetales con almidón: Cada porción de comida en la lista siguiente contiene cerca de 15 g de carbohidratos     ? ½ de taza de maíz, chícharos verdes, camote o puré de papa    ? ¼ de paola papa cristel horneada    ? ½ taza de frijoles, lentejas y guisantes (garbanzo, callahan, habichuela, salmon, partido, de randy roxane)    · Gilbert y meriendas: Cada porción de comida en la lista siguiente contiene cerca de 15 g de carbohidratos     ? 3 galletas de harina cuadradas u 8 galletas en forma de animalitos    ? 6 galletas saladas    ? 3 tazas de palomitas de maíz o ¾ onzas de pretzels, pippa fritas o totopos    · Frutas: Cada porción de comida en la lista siguiente contiene cerca de 15 g de carbohidratos     ? 1 pieza pequeña (4 onzas) de fruta fresca o ¾ a 1 taza de fruta fresca    ? ½ taza de fruta enlatada o congelada, envasada en jugo natural    ? ½ taza (4 onzas) de [place] de fruta sin azúcar    ? 2 cucharadas de fruta seca    · Alimentos azucarados o postres: Cada porción de comida en la lista siguiente contiene cerca de 15 g de carbohidratos     ? 1 coleen de 2 pulgadas de pastel sin glaseado o brownie    ? 2 galletas dulces pequeñas    ? ½ taza de helado, yogur congelado o yogur congelado sin lactosa    ? ¼ de taza de sorbete    ? 1 cucharada de [place] regular, mermelada o jalea    ? 2 cucharadas de jarabe ligero    · Leche y yogur: Katina Balzarine contienen cerca de 12 g de carbohidratos por ración  ? 1 taza de leche sin grasa o baja en grasa    ?  [facility] soja    ? 1 taza de yogur sin grasa que ha sido endulzado con endulzante artificial    · Verduras sin almidón: Cada porción de comida contiene cerca de 5 g de carbohidratos Corbin porciones de vegetales sin almidón cuentan benjamin 1 porción de carbohidratos  ? ½ de taza de verduras cocidas o 1 taza de verduras crudas Estas incluyen remolachas, bróculi, repollo, coliflor, pepino, champiñones, tomates y calabaza  ? ½ taza de jugo de verduras    Cómo utilizar el conteo de carbohidratos para planificar las comidas:  · [employer] las cantidades de carbohidratos usando el tamaño de las porciones:     ? Ejemplo de paola ritu de pasta: Planifica comer pasta, ensalada mixta y un vaso de 8 onzas de [place]  Otoole médico le dice que puede consumir 4 porciones de carbohidratos para la ritu  Paola porción de carbohidratos de pasta es ? de taza  Paola taza de pasta será igual a 3 porciones de carbohidratos  Un vaso de 8 onzas de leche se cuenta benjamin 1 porción de carbohidratos  Estas cantidades de alimentos serían iguales a 4 porciones de carbohidratos  Paola taza de ensalada mixta no cuenta para las porciones de carbohidratos  · Cuente la cantidad de carbohidratos utilizando las etiquetas alimenticias: Busque la cantidad total de los carbohidratos en los alimentos envasados leyendo la etiqueta alimenticia  Las etiquetas alimenticias explican el tamaño de la porción del alimento y la cantidad total de los carbohidratos en cada porción  Busque el tamaño de la porción en la etiqueta alimenticia y después decida cuántas porciones comerá  Multiplique el número de porciones que planea comer por la cantidad de carbohidratos por porción  ? Ejemplo de paola merienda con Isma ferguson: Otoole plan de comidas le permite consumir 2 porciones de carbohidratos (30 gramos) benjamin bocadillo  Planea comer 1 paquete de silvio ferguson, el cual contiene 2 barras  Según la etiqueta alimenticia, el tamaño de la porción en curtis paquete es 1 liang   Cada porción (1 liang) contiene 25 gramos de carbohidratos  La cantidad total de carbohidratos en el paquete de barras de granola sería 50 gramos  Basándose en hines plan alimenticio, usted debe de comer sólo 1 liang de granola  Acuda a eric consultas de control con hines médico según le indicaron  Anote eric preguntas para que se acuerde de hacerlas cira eric visitas  © Copyright [employer] 2021 Information is for End User's use only and may not be sold, redistributed or otherwise used for commercial purposes  All illustrations and images included in CareNotes® are the copyrighted property of A D A M , Inc  or [address] [place] [address] es sólo para uso en educación  Hines intención no es darle un consejo médico sobre enfermedades o tratamientos  Colsulte con hines Maria Stein Bandar farmacéutico antes de seguir cualquier régimen médico para saber si es seguro y efectivo para usted

## 2021-09-09 NOTE — ASSESSMENT & PLAN NOTE
Lab Results   Component Value Date    HGBA1C 6 8 (A) 09/09/2021     HbA1c goal of 6 5-7  Current A1c 6 6-->6 8  Continue Metformin 1000 mg BID   Advised to brings BG logs to next visit   Continue Lipitor 10 mg HS  Adhere to diabetic diet and exercise  Will consider diabetic education if his BG are not controlled at the next visit    Annual ophtalmology and podiatry visits encouraged

## 2021-09-09 NOTE — ASSESSMENT & PLAN NOTE
Chronic, treated with injectable pharmacotherapy in 2004 in [place]   Patient mentions he underwent liver biopsy   Denies RUQ pain  Denies alcohol and illicit drug use   Will obtain Hep C Ab, Hep C viral load, ferritin and liver US   I suspect this may be JOHNSTON however will rule out hemochromatosis, and active liver disease   Discussed with patient to loose weight in order to prevent hepatic steatosis

## 2021-09-10 LAB
HCV AB SER QL: ABNORMAL
HCV RNA SERPL NAA+PROBE-ACNC: NORMAL IU/ML
TEST INFORMATION: NORMAL

## 2021-09-13 PROBLEM — R74.01 TRANSAMINITIS: Status: ACTIVE | Noted: 2021-09-13

## 2021-09-13 PROBLEM — Z23 NEED FOR VACCINATION: Status: RESOLVED | Noted: 2020-10-26 | Resolved: 2021-09-13

## 2021-09-13 PROBLEM — E66.811 CLASS 1 OBESITY DUE TO EXCESS CALORIES WITHOUT SERIOUS COMORBIDITY WITH BODY MASS INDEX (BMI) OF 32.0 TO 32.9 IN ADULT: Status: ACTIVE | Noted: 2020-10-26

## 2021-09-13 PROBLEM — R53.83 OTHER FATIGUE: Status: RESOLVED | Noted: 2020-10-26 | Resolved: 2021-09-13

## 2021-09-13 PROBLEM — H00.11 CHALAZION OF RIGHT UPPER EYELID: Status: RESOLVED | Noted: 2021-06-04 | Resolved: 2021-09-13

## 2021-09-13 PROBLEM — E66.09 CLASS 1 OBESITY DUE TO EXCESS CALORIES WITHOUT SERIOUS COMORBIDITY WITH BODY MASS INDEX (BMI) OF 32.0 TO 32.9 IN ADULT: Status: ACTIVE | Noted: 2020-10-26

## 2021-09-13 PROBLEM — Z76.89 ESTABLISHING CARE WITH NEW DOCTOR, ENCOUNTER FOR: Status: RESOLVED | Noted: 2020-10-26 | Resolved: 2021-09-13

## 2021-09-17 ENCOUNTER — HOSPITAL ENCOUNTER (OUTPATIENT)
Dept: ULTRASOUND IMAGING | Facility: HOSPITAL | Age: 56
Discharge: HOME | End: 2021-09-17
Payer: COMMERCIAL

## 2021-09-17 DIAGNOSIS — Z86.19 HISTORY OF HEPATITIS C: ICD-10-CM

## 2021-09-17 PROCEDURE — 76705 ECHO EXAM OF ABDOMEN: CPT

## 2021-10-27 ENCOUNTER — HOSPITAL ENCOUNTER (EMERGENCY)
Facility: HOSPITAL | Age: 56
Discharge: HOME | End: 2021-10-27
Attending: EMERGENCY MEDICINE | Admitting: EMERGENCY MEDICINE
Payer: COMMERCIAL

## 2021-10-27 VITALS
RESPIRATION RATE: 18 BRPM | WEIGHT: 205.25 LBS | HEART RATE: 69 BPM | SYSTOLIC BLOOD PRESSURE: 145 MMHG | BODY MASS INDEX: 33.13 KG/M2 | TEMPERATURE: 96.7 F | OXYGEN SATURATION: 99 % | DIASTOLIC BLOOD PRESSURE: 82 MMHG

## 2021-10-27 DIAGNOSIS — H10.32 ACUTE BACTERIAL CONJUNCTIVITIS OF LEFT EYE: Primary | ICD-10-CM

## 2021-10-27 PROCEDURE — 99284 EMERGENCY DEPT VISIT MOD MDM: CPT | Performed by: EMERGENCY MEDICINE

## 2021-10-27 PROCEDURE — 99283 EMERGENCY DEPT VISIT LOW MDM: CPT

## 2021-10-27 RX ORDER — TETRACAINE HYDROCHLORIDE 5 MG/ML
2 SOLUTION OPHTHALMIC ONCE
Status: COMPLETED | OUTPATIENT
Start: 2021-10-27 | End: 2021-10-27

## 2021-10-27 RX ORDER — ERYTHROMYCIN 5 MG/G
0.5 OINTMENT OPHTHALMIC EVERY 6 HOURS SCHEDULED
Qty: 3.5 G | Refills: 0 | Status: SHIPPED | OUTPATIENT
Start: 2021-10-27

## 2021-10-27 RX ADMIN — TETRACAINE HYDROCHLORIDE 2 DROP: 5 SOLUTION OPHTHALMIC at 08:23

## 2021-10-27 RX ADMIN — FLUORESCEIN SODIUM 1 STRIP: 1 STRIP OPHTHALMIC at 08:23

## 2021-12-09 ENCOUNTER — OFFICE VISIT (OUTPATIENT)
Dept: FAMILY MEDICINE CLINIC | Facility: CLINIC | Age: 56
End: 2021-12-09

## 2021-12-09 VITALS
OXYGEN SATURATION: 96 % | RESPIRATION RATE: 18 BRPM | HEART RATE: 88 BPM | DIASTOLIC BLOOD PRESSURE: 82 MMHG | TEMPERATURE: 97.1 F | WEIGHT: 202 LBS | BODY MASS INDEX: 32.6 KG/M2 | SYSTOLIC BLOOD PRESSURE: 130 MMHG

## 2021-12-09 DIAGNOSIS — E11.9 TYPE 2 DIABETES MELLITUS WITHOUT COMPLICATION, WITHOUT LONG-TERM CURRENT USE OF INSULIN (HCC): Primary | ICD-10-CM

## 2021-12-09 DIAGNOSIS — Z23 ENCOUNTER FOR IMMUNIZATION: ICD-10-CM

## 2021-12-09 DIAGNOSIS — R25.2 LEG CRAMPING: ICD-10-CM

## 2021-12-09 DIAGNOSIS — R74.01 TRANSAMINITIS: ICD-10-CM

## 2021-12-09 DIAGNOSIS — Z76.0 MEDICATION REFILL: ICD-10-CM

## 2021-12-09 PROBLEM — U07.1 COVID-19 VIRUS INFECTION: Status: RESOLVED | Noted: 2021-01-20 | Resolved: 2021-12-09

## 2021-12-09 PROBLEM — Z11.4 ENCOUNTER FOR SCREENING FOR HIV: Status: RESOLVED | Noted: 2020-10-26 | Resolved: 2021-12-09

## 2021-12-09 LAB — SL AMB POCT HEMOGLOBIN AIC: 6.9 (ref ?–6.5)

## 2021-12-09 PROCEDURE — 90471 IMMUNIZATION ADMIN: CPT | Performed by: FAMILY MEDICINE

## 2021-12-09 PROCEDURE — 83036 HEMOGLOBIN GLYCOSYLATED A1C: CPT | Performed by: FAMILY MEDICINE

## 2021-12-09 PROCEDURE — 90682 RIV4 VACC RECOMBINANT DNA IM: CPT | Performed by: FAMILY MEDICINE

## 2021-12-09 PROCEDURE — 99213 OFFICE O/P EST LOW 20 MIN: CPT | Performed by: FAMILY MEDICINE

## 2021-12-09 RX ORDER — MELATONIN
1000 DAILY
Qty: 30 TABLET | Refills: 3 | Status: SHIPPED | OUTPATIENT
Start: 2021-12-09 | End: 2022-04-18

## 2021-12-09 RX ORDER — LISINOPRIL 10 MG/1
10 TABLET ORAL DAILY
Qty: 30 TABLET | Refills: 3 | Status: SHIPPED | OUTPATIENT
Start: 2021-12-09 | End: 2022-06-15

## 2021-12-27 ENCOUNTER — APPOINTMENT (EMERGENCY)
Dept: RADIOLOGY | Facility: HOSPITAL | Age: 56
End: 2021-12-27
Payer: COMMERCIAL

## 2021-12-27 ENCOUNTER — HOSPITAL ENCOUNTER (EMERGENCY)
Facility: HOSPITAL | Age: 56
Discharge: HOME | End: 2021-12-27
Attending: EMERGENCY MEDICINE | Admitting: EMERGENCY MEDICINE
Payer: COMMERCIAL

## 2021-12-27 VITALS
DIASTOLIC BLOOD PRESSURE: 76 MMHG | HEART RATE: 85 BPM | OXYGEN SATURATION: 98 % | RESPIRATION RATE: 18 BRPM | SYSTOLIC BLOOD PRESSURE: 149 MMHG | TEMPERATURE: 98.4 F

## 2021-12-27 DIAGNOSIS — R53.83 FATIGUE: ICD-10-CM

## 2021-12-27 DIAGNOSIS — R42 EPISODIC LIGHTHEADEDNESS: Primary | ICD-10-CM

## 2021-12-27 DIAGNOSIS — R73.9 HYPERGLYCEMIA: ICD-10-CM

## 2021-12-27 LAB
ALBUMIN SERPL BCP-MCNC: 3.7 G/DL (ref 3.5–5)
ALP SERPL-CCNC: 145 U/L (ref 46–116)
ALT SERPL W P-5'-P-CCNC: 51 U/L (ref 12–78)
ANION GAP SERPL CALCULATED.3IONS-SCNC: 8 MMOL/L (ref 4–13)
AST SERPL W P-5'-P-CCNC: 24 U/L (ref 5–45)
ATRIAL RATE: 68 BPM
BASOPHILS # BLD MANUAL: 0 THOUSAND/UL (ref 0–0.1)
BASOPHILS NFR MAR MANUAL: 0 % (ref 0–1)
BILIRUB SERPL-MCNC: 0.2 MG/DL (ref 0.2–1)
BUN SERPL-MCNC: 13 MG/DL (ref 5–25)
CALCIUM SERPL-MCNC: 8.4 MG/DL (ref 8.3–10.1)
CARDIAC TROPONIN I PNL SERPL HS: 4 NG/L
CHLORIDE SERPL-SCNC: 103 MMOL/L (ref 100–108)
CO2 SERPL-SCNC: 28 MMOL/L (ref 21–32)
CREAT SERPL-MCNC: 0.86 MG/DL (ref 0.6–1.3)
EOSINOPHIL # BLD MANUAL: 0.05 THOUSAND/UL (ref 0–0.4)
EOSINOPHIL NFR BLD MANUAL: 1 % (ref 0–6)
ERYTHROCYTE [DISTWIDTH] IN BLOOD BY AUTOMATED COUNT: 13.1 % (ref 11.6–15.1)
GFR SERPL CREATININE-BSD FRML MDRD: 98 ML/MIN/1.73SQ M
GLUCOSE SERPL-MCNC: 220 MG/DL (ref 65–140)
GLUCOSE SERPL-MCNC: 221 MG/DL (ref 65–140)
HCT VFR BLD AUTO: 41.9 % (ref 36.5–49.3)
HGB BLD-MCNC: 14.3 G/DL (ref 12–17)
LYMPHOCYTES # BLD AUTO: 1.81 THOUSAND/UL (ref 0.6–4.47)
LYMPHOCYTES # BLD AUTO: 34 % (ref 14–44)
MCH RBC QN AUTO: 29.3 PG (ref 26.8–34.3)
MCHC RBC AUTO-ENTMCNC: 34.1 G/DL (ref 31.4–37.4)
MCV RBC AUTO: 86 FL (ref 82–98)
MONOCYTES # BLD AUTO: 0.27 THOUSAND/UL (ref 0–1.22)
MONOCYTES NFR BLD: 5 % (ref 4–12)
NEUTROPHILS # BLD MANUAL: 3.19 THOUSAND/UL (ref 1.85–7.62)
NEUTS SEG NFR BLD AUTO: 60 % (ref 43–75)
P AXIS: -29 DEGREES
PLATELET # BLD AUTO: 95 THOUSANDS/UL (ref 149–390)
PLATELET BLD QL SMEAR: ABNORMAL
PMV BLD AUTO: 11.9 FL (ref 8.9–12.7)
POTASSIUM SERPL-SCNC: 4.1 MMOL/L (ref 3.5–5.3)
PR INTERVAL: 138 MS
PROT SERPL-MCNC: 7 G/DL (ref 6.4–8.2)
QRS AXIS: 64 DEGREES
QRSD INTERVAL: 86 MS
QT INTERVAL: 382 MS
QTC INTERVAL: 406 MS
RBC # BLD AUTO: 4.88 MILLION/UL (ref 3.88–5.62)
RBC MORPH BLD: NORMAL
SODIUM SERPL-SCNC: 139 MMOL/L (ref 136–145)
T WAVE AXIS: 40 DEGREES
VENTRICULAR RATE: 68 BPM
WBC # BLD AUTO: 5.32 THOUSAND/UL (ref 4.31–10.16)

## 2021-12-27 PROCEDURE — 99285 EMERGENCY DEPT VISIT HI MDM: CPT | Performed by: EMERGENCY MEDICINE

## 2021-12-27 PROCEDURE — 85027 COMPLETE CBC AUTOMATED: CPT | Performed by: EMERGENCY MEDICINE

## 2021-12-27 PROCEDURE — 36415 COLL VENOUS BLD VENIPUNCTURE: CPT | Performed by: EMERGENCY MEDICINE

## 2021-12-27 PROCEDURE — 85025 COMPLETE CBC W/AUTO DIFF WBC: CPT | Performed by: EMERGENCY MEDICINE

## 2021-12-27 PROCEDURE — 93010 ELECTROCARDIOGRAM REPORT: CPT

## 2021-12-27 PROCEDURE — 85007 BL SMEAR W/DIFF WBC COUNT: CPT | Performed by: EMERGENCY MEDICINE

## 2021-12-27 PROCEDURE — 84484 ASSAY OF TROPONIN QUANT: CPT | Performed by: EMERGENCY MEDICINE

## 2021-12-27 PROCEDURE — 99284 EMERGENCY DEPT VISIT MOD MDM: CPT

## 2021-12-27 PROCEDURE — 71046 X-RAY EXAM CHEST 2 VIEWS: CPT

## 2021-12-27 PROCEDURE — 96360 HYDRATION IV INFUSION INIT: CPT

## 2021-12-27 PROCEDURE — 87636 SARSCOV2 & INF A&B AMP PRB: CPT | Performed by: EMERGENCY MEDICINE

## 2021-12-27 PROCEDURE — 82948 REAGENT STRIP/BLOOD GLUCOSE: CPT

## 2021-12-27 PROCEDURE — 93005 ELECTROCARDIOGRAM TRACING: CPT

## 2021-12-27 PROCEDURE — 80053 COMPREHEN METABOLIC PANEL: CPT | Performed by: EMERGENCY MEDICINE

## 2021-12-27 RX ADMIN — SODIUM CHLORIDE 500 ML: 0.9 INJECTION, SOLUTION INTRAVENOUS at 07:58

## 2021-12-27 NOTE — ED ATTENDING ATTESTATION
12/27/2021  IArsenio MD, saw and evaluated the patient  I have discussed the patient with the resident/non-physician practitioner and agree with the resident's/non-physician practitioner's findings, Plan of Care, and MDM as documented in the resident's/non-physician practitioner's note, except where noted  All available labs and Radiology studies were reviewed  I was present for key portions of any procedure(s) performed by the resident/non-physician practitioner and I was immediately available to provide assistance  At this point I agree with the current assessment done in the Emergency Department  I have conducted an independent evaluation of this patient a history and physical is as follows:    Diabetic  Not feeling well, lightheaded  No nausea or vomiting  No chest pain or shortness of breath  No fever  Felt lightheaded this morning  Lungs are clear  Heart is regular  Abdomen is benign  Will check orthostatics and labs  Sugar was a little elevated at 221 so will get some fluids and we will do a COVID test as he is not feeling well although seems unlikely as he has had 2 vaccines and no pulmonary symptoms      ED Course         Critical Care Time  Procedures

## 2021-12-27 NOTE — Clinical Note
Brianna Alberts was seen and treated in our emergency department on 12/27/2021  Diagnosis: Fatigue    Sonia Son  is off the rest of the shift today  He may return on this date:      Please excuse Brianna Alberts from work pending the results of his COVID PCR test result  If you have any questions or concerns, please don't hesitate to call        Jorge Luis Covington, DO    ______________________________           _______________          _______________  Hospital Representative                              Date                                Time

## 2021-12-27 NOTE — DISCHARGE INSTRUCTIONS
You have been seen for lightheadedness  Return to the emergency department if you develop worsening lightheadedness, vertigo, slurred speech, focal weakness/numbness/tingling or any other symptoms of concern  Please follow up with your PCP by calling the number provided

## 2021-12-27 NOTE — ED NOTES
Pt  Ambulatory from waiting room with steady gait at this time       Mckinley Saldana, MONIE  12/27/21 [id number]

## 2021-12-27 NOTE — Clinical Note
Marcella Johnston was seen and treated in our emergency department on 12/27/2021  Diagnosis: Luh Aguilar  is off the rest of the shift today  He may return on this date:     Please excuse Marcella Johnston from work pending the results of his COVID PCR test result  Please excuse from work for the next two days pending test results  If you have any questions or concerns, please don't hesitate to call        Boone Brady DO    ______________________________           _______________          _______________  Hospital Representative                              Date                                Time

## 2021-12-27 NOTE — ED PROVIDER NOTES
History  Chief Complaint   Patient presents with    Dizziness     Pt reports dizziness since waking up this morning  Liz Barcenas is a 49y o  year old male with PMH of DM, HTN presenting to the [facility] ED for lightheadedness  Symptoms started this morning when awakening at 0530  Patient feels generalized weakness this morning and lightheaded, worse when walking  No associated headache, double vision, vertigo, slurred speech, trouble swallowing  Denies focal weakness/numbness/tingling in extremities  Patient denies chest pain, dyspnea, N/V/D or abdominal pain  Patient denies known sick contacts  Reportedly immunized x2 against COVID19  History provided by:  Patient   used: Yes    Dizziness  Associated symptoms: no chest pain, no diarrhea, no headaches, no nausea, no shortness of breath, no vomiting and no weakness        Prior to Admission Medications   Prescriptions Last Dose Informant Patient Reported? Taking?    Blood Glucose Monitoring Suppl (OneTouch Verio) w/Device KIT  Self No No   Sig: Use daily   Lancets (OneTouch Delica Plus [id number]) MISC  Self No No   Sig: USE EVERY MORNING   atorvastatin (LIPITOR) 10 mg tablet   No No   Sig: Take 1 tablet (10 mg total) by mouth daily   cholecalciferol (VITAMIN D3) 1,000 units tablet   No No   Sig: Take 1 tablet (1,000 Units total) by mouth daily   erythromycin (ILOTYCIN) ophthalmic ointment   No No   Sig: Administer 0 5 inches into the left eye every 6 (six) hours   glucose blood (OneTouch Verio) test strip   No No   Sig: USE TO CHECK BLOOD SUGAR EVERY MORNING   latanoprost (XALATAN) 0 005 % ophthalmic solution   No No   Sig: Administer 1 drop to both eyes daily   lisinopril (ZESTRIL) 10 mg tablet   No No   Sig: Take 1 tablet (10 mg total) by mouth daily   metFORMIN (GLUCOPHAGE) 1000 MG tablet   No No   Sig: TAKE 1 TABLET(1000 MG) BY MOUTH TWICE DAILY WITH MEALS      Facility-Administered Medications: None       Past Medical History: Diagnosis Date    Diabetes mellitus ([facility] [address] )     Glaucoma     Hypertension        Past Surgical History:   Procedure Laterality Date    CARPAL TUNNEL RELEASE      HERNIA REPAIR         History reviewed  No pertinent family history  I have reviewed and agree with the history as documented  E-Cigarette/Vaping    E-Cigarette Use Never User      E-Cigarette/Vaping Substances    Nicotine No     THC No     CBD No     Flavoring No     Other No     Unknown No      Social History     Tobacco Use    Smoking status: Never Smoker    Smokeless tobacco: Never Used   Vaping Use    Vaping Use: Never used   Substance Use Topics    Alcohol use: Never    Drug use: Never        Review of Systems   Constitutional: Positive for fatigue  Negative for chills and fever  HENT: Negative for congestion and rhinorrhea  Eyes: Negative for visual disturbance  Respiratory: Negative for cough and shortness of breath  Cardiovascular: Negative for chest pain and leg swelling  Gastrointestinal: Negative for abdominal distention, abdominal pain, diarrhea, nausea and vomiting  Endocrine: Negative for polyuria  Genitourinary: Negative for dysuria and hematuria  Musculoskeletal: Negative for arthralgias, gait problem and joint swelling  Skin: Negative for rash  Neurological: Positive for light-headedness  Negative for dizziness, tremors, seizures, syncope, facial asymmetry, speech difficulty, weakness, numbness and headaches  Hematological: Does not bruise/bleed easily  Psychiatric/Behavioral: Negative for behavioral problems and confusion  All other systems reviewed and are negative        Physical Exam  ED Triage Vitals   Temperature Pulse Respirations Blood Pressure SpO2   12/27/21 0646 12/27/21 0646 12/27/21 0646 12/27/21 0646 12/27/21 0646   98 4 °F (36 9 °C) 88 18 155/72 98 %      Temp Source Heart Rate Source Patient Position - Orthostatic VS BP Location FiO2 (%)   12/27/21 0646 12/27/21 0646 12/27/21 4456 12/27/21 0646 --   Oral Monitor Lying - Orthostatic VS Right arm       Pain Score       --                    Orthostatic Vital Signs  Vitals:    12/27/21 0646 12/27/21 0805 12/27/21 0806 12/27/21 0808   BP: 155/72 135/66 143/75 149/76   Pulse: 88 71 79 85   Patient Position - Orthostatic VS:  Lying - Orthostatic VS Sitting - Orthostatic VS Standing - Orthostatic VS       Physical Exam  Vitals and nursing note reviewed  Constitutional:       General: He is not in acute distress  Appearance: Normal appearance  He is well-developed  He is not ill-appearing, toxic-appearing or diaphoretic  HENT:      Head: Normocephalic and atraumatic  Nose: No congestion or rhinorrhea  Eyes:      General: No visual field deficit  Right eye: No discharge  Left eye: No discharge  Cardiovascular:      Rate and Rhythm: Normal rate and regular rhythm  Pulmonary:      Effort: Pulmonary effort is normal  No respiratory distress  Breath sounds: Normal breath sounds  No wheezing or rales  Abdominal:      General: Bowel sounds are normal       Palpations: Abdomen is soft  Tenderness: There is no abdominal tenderness  There is no right CVA tenderness, left CVA tenderness, guarding or rebound  Musculoskeletal:      Cervical back: No rigidity  Right lower leg: No edema  Left lower leg: No edema  Skin:     General: Skin is warm  Capillary Refill: Capillary refill takes less than 2 seconds  Neurological:      Mental Status: He is alert and oriented to person, place, and time  GCS: GCS eye subscore is 4  GCS verbal subscore is 5  GCS motor subscore is 6  Cranial Nerves: No cranial nerve deficit, dysarthria or facial asymmetry  Sensory: Sensation is intact  Motor: No weakness, tremor, abnormal muscle tone, seizure activity or pronator drift  Coordination: Finger-Nose-Finger Test normal       Gait: Gait is intact   Gait and tandem walk normal       Comments: Strength +5/5 in bilateral UE/LE  No vertical nystagmus noted  CN III, IV and VI intact  Cerebellar testing: Normal FNF without ataxia  No pronator drift noted  Psychiatric:         Mood and Affect: Mood normal          Behavior: Behavior normal          ED Medications  Medications   sodium chloride 0 9 % bolus 500 mL (0 mL Intravenous Stopped 12/27/21 0915)       Diagnostic Studies  Results Reviewed     Procedure Component Value Units Date/Time    Manual Differential(PHLEBS Do Not Order) [[id number]]  (Abnormal) Collected: 12/27/21 0732    Lab Status: Final result Specimen: Blood from Arm, Right Updated: 12/27/21 0831     Segmented % 60 %      Lymphocytes % 34 %      Monocytes % 5 %      Eosinophils, % 1 %      Basophils % 0 %      Absolute Neutrophils 3 19 Thousand/uL      Lymphocytes Absolute 1 81 Thousand/uL      Monocytes Absolute 0 27 Thousand/uL      Eosinophils Absolute 0 05 Thousand/uL      Basophils Absolute 0 00 Thousand/uL      Total Counted --     RBC Morphology Normal     Platelet Estimate Decreased    HS Troponin 0hr (reflex protocol) [[id number]]  (Normal) Collected: 12/27/21 0732    Lab Status: Final result Specimen: Blood from Arm, Right Updated: 12/27/21 0816     hs TnI 0hr 4 ng/L     COVID/FLU - 24 hour TAT [[id number]] Collected: 12/27/21 0810    Lab Status:  In process Specimen: Nares from Nose Updated: 12/27/21 0814    Comprehensive metabolic panel [[id number]]  (Abnormal) Collected: 12/27/21 0733    Lab Status: Final result Specimen: Blood from Arm, Right Updated: 12/27/21 0801     Sodium 139 mmol/L      Potassium 4 1 mmol/L      Chloride 103 mmol/L      CO2 28 mmol/L      ANION GAP 8 mmol/L      BUN 13 mg/dL      Creatinine 0 86 mg/dL      Glucose 220 mg/dL      Calcium 8 4 mg/dL      AST 24 U/L      ALT 51 U/L      Alkaline Phosphatase 145 U/L      Total Protein 7 0 g/dL      Albumin 3 7 g/dL      Total Bilirubin 0 20 mg/dL      eGFR 98 ml/min/1 73sq m     Narrative:      National Kidney Disease Foundation guidelines for Chronic Kidney Disease (CKD):     Stage 1 with normal or high GFR (GFR > 90 mL/min/1 73 square meters)    Stage 2 Mild CKD (GFR = 60-89 mL/min/1 73 square meters)    Stage 3A Moderate CKD (GFR = 45-59 mL/min/1 73 square meters)    Stage 3B Moderate CKD (GFR = 30-44 mL/min/1 73 square meters)    Stage 4 Severe CKD (GFR = 15-29 mL/min/1 73 square meters)    Stage 5 End Stage CKD (GFR <15 mL/min/1 73 square meters)  Note: GFR calculation is accurate only with a steady state creatinine    CBC and differential [[id number]]  (Abnormal) Collected: 12/27/21 0732    Lab Status: Final result Specimen: Blood from Arm, Right Updated: 12/27/21 0754     WBC 5 32 Thousand/uL      RBC 4 88 Million/uL      Hemoglobin 14 3 g/dL      Hematocrit 41 9 %      MCV 86 fL      MCH 29 3 pg      MCHC 34 1 g/dL      RDW 13 1 %      MPV 11 9 fL      Platelets 95 Thousands/uL     Narrative: This is an appended report  These results have been appended to a previously verified report  Fingerstick Glucose (POCT) [[id number]]  (Abnormal) Collected: 12/27/21 0652    Lab Status: Final result Updated: 12/27/21 0653     POC Glucose 221 mg/dl                  XR chest 2 views   Final Result by Marlee Roy MD (12/27 2506)      No acute cardiopulmonary disease  Workstation performed: [id number]               Procedures  Procedures      ED Course  ED Course as of 12/27/21 1338   Mon Dec 27, 2021   3250 Procedure Note: EKG  Date/Time: 12/27/21 8:03 AM   Interpreted by: Mariah Frost DO  Indications / Diagnosis: fatigue, lightheaded  ECG reviewed by me, the ED Provider: yes   The EKG demonstrates:  Rhythm: normal sinus rhythm 68 BPM  Intervals: Normal CO and QT intervals  Axis: Normal axis  QRS/Blocks: Normal QRS  ST Changes: No acute ST/T waves changes  No TANISHA  No TWI    Comparison: Compared to prior EKG performed on 08/31/2021   0827 hs TnI 0hr: 4   0558 Patient reassessed, he is feeling better  Reviewed labs and imaging results with patient  Will plan for discharge at this time  Stroke Assessment     Row Name 12/27/21 0747             NIH Stroke Scale    Interval Baseline      Level of Consciousness (1a ) 0      LOC Questions (1b ) 0      LOC Commands (1c ) 0      Best Gaze (2 ) 0      Visual (3 ) 0      Facial Palsy (4 ) 0      Motor Arm, Left (5a ) 0      Motor Arm, Right (5b ) 0      Motor Leg, Left (6a ) 0      Motor Leg, Right (6b ) 0      Limb Ataxia (7 ) 0      Sensory (8 ) 0      Best Language (9 ) 0      Dysarthria (10 ) 0      Extinction and Inattention (11 ) (Formerly Neglect) 0      Total 0                          SBIRT 22yo+      Most Recent Value   SBIRT (23 yo +)    In order to provide better care to our patients, we are screening all of our patients for alcohol and drug use  Would it be okay to ask you these screening questions? No Filed at: 12/27/2021 0619                MDM  Number of Diagnoses or Management Options  Episodic lightheadedness  Fatigue  Hyperglycemia  Diagnosis management comments:   49 y o  male presenting for fatigue and lightheadedness  Patient denies vertigo, slurred speech, focal weakness/numbness/tingling  NIHSS 0  Will order CBC, CMP, troponin, EKG, CXR to evaluate for ACS, PTX, pulmonary disease, widened mediastinum  Will also send COVID PCR test     I have discussed with the patient our plan to discharge them from the ED and the patient is in agreement with this plan  The patient was provided a written after visit summary with strict RTED precautions  Followup: I have discussed with the patient plan to follow up with their PCP   Contact information provided in AVS        Amount and/or Complexity of Data Reviewed  Clinical lab tests: ordered and reviewed  Tests in the radiology section of CPT®: ordered and reviewed  Review and summarize past medical records: yes    Patient Progress  Patient progress: stable      Disposition  Final diagnoses:   Episodic lightheadedness   Fatigue   Hyperglycemia     Time reflects when diagnosis was documented in both MDM as applicable and the Disposition within this note     Time User Action Codes Description Comment    12/27/2021  7:49 AM Kassandra Singh Add [R42] Episodic lightheadedness     12/27/2021  7:49 AM Kassandra Singh Add [R53 83] Fatigue     12/27/2021  8:39 AM Tr Mena Add [R73 9] Hyperglycemia       ED Disposition     ED Disposition Condition Date/Time Comment    Discharge Stable Mon Dec 27, 2021  9:03 AM Darleen Cho discharge to home/self care  Follow-up Information     Follow up With Specialties Details Why Contact Info Additional [facility] Schedule an appointment as soon as possible for a visit  To make appointment for reevaluation in 3-5 days   [address], [address] [facility], [address], [address] [facility], [address]          Discharge Medication List as of 12/27/2021  9:04 AM      CONTINUE these medications which have NOT CHANGED    Details   atorvastatin (LIPITOR) 10 mg tablet Take 1 tablet (10 mg total) by mouth daily, Starting Thu 9/9/2021, Normal      Blood Glucose Monitoring Suppl (OneTouch Verio) w/Device KIT Use daily, Starting Fri 1/15/2021, Normal      cholecalciferol (VITAMIN D3) 1,000 units tablet Take 1 tablet (1,000 Units total) by mouth daily, Starting Thu 12/9/2021, Normal      erythromycin (ILOTYCIN) ophthalmic ointment Administer 0 5 inches into the left eye every 6 (six) hours, Starting Wed 10/27/2021, Normal      glucose blood (OneTouch Verio) test strip USE TO CHECK BLOOD SUGAR EVERY MORNING, Normal      Lancets (OneTouch Delica Plus [id number]) MISC USE EVERY MORNING, Normal      latanoprost (XALATAN) 0 005 % ophthalmic solution Administer 1 drop to both eyes daily, Starting Thu 9/9/2021, Normal      lisinopril (ZESTRIL) 10 mg tablet Take 1 tablet (10 mg total) by mouth daily, Starting Thu 12/9/2021, Normal      metFORMIN (GLUCOPHAGE) 1000 MG tablet TAKE 1 TABLET(1000 MG) BY MOUTH TWICE DAILY WITH MEALS, Normal           No discharge procedures on file  PDMP Review     None           ED Provider  Attending physically available and evaluated Sloane Reyna I managed the patient along with the ED Attending      Electronically Signed by         Marylene Mocha, DO  12/27/21 4765

## 2021-12-28 LAB
FLUAV RNA RESP QL NAA+PROBE: NEGATIVE
FLUBV RNA RESP QL NAA+PROBE: NEGATIVE
SARS-COV-2 RNA RESP QL NAA+PROBE: NEGATIVE

## 2022-01-22 ENCOUNTER — APPOINTMENT (OUTPATIENT)
Dept: LAB | Facility: HOSPITAL | Age: 57
End: 2022-01-22
Payer: MEDICARE

## 2022-01-22 DIAGNOSIS — K76.0 HEPATIC STEATOSIS: ICD-10-CM

## 2022-01-22 DIAGNOSIS — R74.01 TRANSAMINITIS: ICD-10-CM

## 2022-01-22 LAB
ALBUMIN SERPL BCP-MCNC: 4.1 G/DL (ref 3–5.2)
ALP SERPL-CCNC: 115 U/L (ref 43–122)
ALT SERPL W P-5'-P-CCNC: 48 U/L
AST SERPL W P-5'-P-CCNC: 40 U/L (ref 17–59)
BILIRUB DIRECT SERPL-MCNC: 0.26 MG/DL
BILIRUB SERPL-MCNC: 0.69 MG/DL
PROT SERPL-MCNC: 7.4 G/DL (ref 5.9–8.4)

## 2022-01-22 PROCEDURE — 36415 COLL VENOUS BLD VENIPUNCTURE: CPT

## 2022-01-22 PROCEDURE — 80076 HEPATIC FUNCTION PANEL: CPT

## 2022-02-10 DIAGNOSIS — E78.49 OTHER HYPERLIPIDEMIA: ICD-10-CM

## 2022-02-10 RX ORDER — ATORVASTATIN CALCIUM 10 MG/1
TABLET, FILM COATED ORAL
Qty: 30 TABLET | Refills: 3 | Status: SHIPPED | OUTPATIENT
Start: 2022-02-10 | End: 2022-06-15

## 2022-02-22 ENCOUNTER — TELEPHONE (OUTPATIENT)
Dept: FAMILY MEDICINE CLINIC | Facility: CLINIC | Age: 57
End: 2022-02-22

## 2022-02-22 NOTE — TELEPHONE ENCOUNTER
PATIENTS APPT FOR 03/10/2021 WAS CANCELED PROVIDER WILL NOT BE AVAILABLE     CALLED PT TO CALL BACK TO RESCHEDULE APPT

## 2022-04-06 NOTE — PROGRESS NOTES
Assessment/Plan:    Type 2 diabetes mellitus without complication, without long-term current use of insulin (HCC)    Lab Results   Component Value Date    HGBA1C 6 5 04/07/2022     HbA1c goal of 6 5-7  A1c 6 5 today, at goal   Continue Metformin 1000 mg BID   Reviewed BG logs   Continue Lipitor 10 mg HS  Adhere to diabetic diet, exercise and weight loss  Annual ophtalmology and podiatry visits encouraged  Referral to podiatry placed  Diabetic foot exam performed today    Transaminitis  Stable, resolved  CMP (12/27/21): AST 24, ALT 51  Ferritin and Hep C quant negative (09/09/21)  I recommended he works on lifestyle modifications with weight loss to prevent further fat accumulation in the liver  Advised patient to refrain from alcohol intake if possible  Reduce Tylenol intake to no more than 3 g daily  Diagnoses and all orders for this visit:    Type 2 diabetes mellitus without complication, without long-term current use of insulin (HCC)  -     POCT hemoglobin A1c  -     metFORMIN (GLUCOPHAGE) 1000 MG tablet; TAKE 1 TABLET(1000 MG) BY MOUTH TWICE DAILY WITH MEALS  -     Ambulatory Referral to Podiatry; Future    [facility]    Transaminitis    Encounter for vitamin deficiency screening  -     Vitamin D 25 hydroxy; Future    Diminished pulses in lower extremity  Comments: Will send patient for venous US to obtain more information  Phone number provided to schedule appt  Orders:  -     VAS HOLLIS & waveform analysis, multiple levels; Future    Left wrist pain  Comments:  XR L hand & wrist to better formulate a dx  Ddx: degenerative arthritis of left wrist and index finger, chondrocalcinosis  Ice packs, Nsaids PRN, Tylenol  Orders:  -     XR hand 3+ vw left; Future  -     XR wrist 3+ vw left; Future          Subjective:      Patient ID: Ceasar Hills is a 49 y o  male  HPI     Patient presents today to the office for chronic conditions follow up visit      Mentions he check fasting BG at home, ranging between 122-148  He is compliant with his medications  Patient mentions he has been focusing on loosing wt, last time in the visit his weight recorded at 202 lbs  Mentions at home he's 195 lbs in the morning  Reports his wife is following a diet, had underwent bariatric surgery and therefore he's supporting her in dieting together  Other Concerns:  Noted left index getting stuck, and sometimes he feels pain on the dorsal side of the palm along the joint line where his left index meets the palm  He is not in pain currently  This problem is chronic, occurred 3 years ago  No alleviating or aggravating factors  Patient also reports being unable to extend his left wrist how he used to in the past  He cannot do push ups anymore  This problem occurred in 2013  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, works in a warehouse  Sometimes he notes pain in his L wrist  Has been using OTC Voltaren gel  He also uses left wrist brace when it bothers him more  No recent falls, injuries, or accidents  The following portions of the patient's history were reviewed and updated as appropriate: allergies, current medications, past family history, past medical history, past social history, past surgical history and problem list     Review of Systems   Constitutional: Negative for chills and fever  HENT: Negative for sore throat  Eyes: Negative for visual disturbance  Respiratory: Negative  Negative for cough and shortness of breath  Cardiovascular: Negative for chest pain and leg swelling  Gastrointestinal: Negative  Negative for abdominal pain, blood in stool, constipation, diarrhea, nausea and vomiting  Genitourinary: Negative  Negative for dysuria and hematuria  Musculoskeletal: Positive for arthralgias  Left index trigger finger  Left wrist restricted ROM, and extension   Skin: Negative for rash  Neurological: Negative for dizziness and headaches     Psychiatric/Behavioral: The patient is not nervous/anxious  Objective:    /92 (BP Location: Left arm, Patient Position: Sitting, Cuff Size: Standard)   Pulse 96   Temp 97 6 °F (36 4 °C) (Temporal)   Resp 18   Wt 91 1 kg (200 lb 12 8 oz)   SpO2 98%   BMI 32 41 kg/m²      Physical Exam  Vitals and nursing note reviewed  Constitutional:       General: He is not in acute distress  Appearance: Normal appearance  He is well-developed  He is not ill-appearing, toxic-appearing or diaphoretic  HENT:      Head: Normocephalic and atraumatic  Nose: Nose normal    Eyes:      General:         Right eye: No discharge  Left eye: No discharge  Extraocular Movements: Extraocular movements intact  Conjunctiva/sclera: Conjunctivae normal    Cardiovascular:      Rate and Rhythm: Normal rate and regular rhythm  Pulses: Pulses are weak  Dorsalis pedis pulses are 1+ on the right side and 1+ on the left side  Posterior tibial pulses are 1+ on the right side and 1+ on the left side  Heart sounds: Normal heart sounds  Pulmonary:      Effort: Pulmonary effort is normal  No respiratory distress  Breath sounds: Normal breath sounds  Abdominal:      General: Bowel sounds are normal       Palpations: Abdomen is soft  Tenderness: There is no abdominal tenderness  Musculoskeletal:         General: Normal range of motion  Hands:       Cervical back: Normal range of motion and neck supple  Right lower leg: No edema  Left lower leg: No edema  Comments: No underlying erythema, edema, deformity or ecchymosis over the L index finger and L wrist    Tenderness over the palmar side,  Along left index finger joint line  Sensation intact throughout the wrist and hand   strength normal  Opposition intact  Strength hand 5/5 b/l  Restriction with extending L wrist compared to R wrist    Feet:      Right foot:      Skin integrity: No erythema, warmth or dry skin        Left foot:      Skin integrity: No erythema, warmth or dry skin  Skin:     General: Skin is warm  Findings: No rash  Neurological:      Mental Status: He is alert and oriented to person, place, and time  Psychiatric:         Mood and Affect: Mood normal          Behavior: Behavior normal          Thought Content: Thought content normal          Judgment: Judgment normal        Patient's shoes and socks removed  Right Foot/Ankle   Right Foot Inspection  Skin Exam: skin intact and abnormal color (great toe discolored nail)  No dry skin, no warmth, no erythema and no pre-ulcer  Toe Exam: ROM and strength within normal limits  No swelling, no tenderness and erythema    Sensory   Proprioception: intact  Monofilament testing: intact    Vascular  The right DP pulse is 1+  The right PT pulse is 1+  Left Foot/Ankle  Left Foot Inspection  Skin Exam: skin intact and abnormal color (great toe discolored nail)  No dry skin, no warmth, no erythema and no pre-ulcer  Toe Exam: ROM and strength within normal limits  No swelling, no tenderness and no erythema  Sensory   Proprioception: intact  Monofilament testing: intact    Vascular  The left DP pulse is 1+  The left PT pulse is 1+       Assign Risk Category  No deformity present  No loss of protective sensation  Weak pulses  Risk: 2

## 2022-04-06 NOTE — ASSESSMENT & PLAN NOTE
Stable, resolved  CMP (12/27/21): AST 24, ALT 51  Ferritin and Hep C quant negative (09/09/21)  I recommended he works on lifestyle modifications with weight loss to prevent further fat accumulation in the liver  Advised patient to refrain from alcohol intake if possible  Reduce Tylenol intake to no more than 3 g daily

## 2022-04-06 NOTE — ASSESSMENT & PLAN NOTE
Lab Results   Component Value Date    HGBA1C 6 5 04/07/2022     HbA1c goal of 6 5-7  A1c 6 5 today, at goal   Continue Metformin 1000 mg BID   Reviewed BG logs   Continue Lipitor 10 mg HS  Adhere to diabetic diet, exercise and weight loss  Annual ophtalmology and podiatry visits encouraged   Referral to podiatry placed  Diabetic foot exam performed today

## 2022-04-07 ENCOUNTER — OFFICE VISIT (OUTPATIENT)
Dept: FAMILY MEDICINE CLINIC | Facility: CLINIC | Age: 57
End: 2022-04-07

## 2022-04-07 VITALS
TEMPERATURE: 97.6 F | RESPIRATION RATE: 18 BRPM | BODY MASS INDEX: 32.41 KG/M2 | OXYGEN SATURATION: 98 % | DIASTOLIC BLOOD PRESSURE: 92 MMHG | WEIGHT: 200.8 LBS | HEART RATE: 96 BPM | SYSTOLIC BLOOD PRESSURE: 128 MMHG

## 2022-04-07 DIAGNOSIS — E11.9 TYPE 2 DIABETES MELLITUS WITHOUT COMPLICATION, WITHOUT LONG-TERM CURRENT USE OF INSULIN (HCC): Primary | ICD-10-CM

## 2022-04-07 DIAGNOSIS — R74.01 TRANSAMINITIS: ICD-10-CM

## 2022-04-07 DIAGNOSIS — R09.89 DIMINISHED PULSES IN LOWER EXTREMITY: ICD-10-CM

## 2022-04-07 DIAGNOSIS — E11.9 ENCOUNTER FOR DIABETIC FOOT EXAM (HCC): ICD-10-CM

## 2022-04-07 DIAGNOSIS — M25.532 LEFT WRIST PAIN: ICD-10-CM

## 2022-04-07 DIAGNOSIS — Z13.21 ENCOUNTER FOR VITAMIN DEFICIENCY SCREENING: ICD-10-CM

## 2022-04-07 PROBLEM — I83.92 VARICOSE VEINS OF LEFT LOWER EXTREMITY: Status: RESOLVED | Noted: 2021-06-04 | Resolved: 2022-04-07

## 2022-04-07 LAB — SL AMB POCT HEMOGLOBIN AIC: 6.5 (ref ?–6.5)

## 2022-04-07 PROCEDURE — 83036 HEMOGLOBIN GLYCOSYLATED A1C: CPT | Performed by: FAMILY MEDICINE

## 2022-04-07 PROCEDURE — 99214 OFFICE O/P EST MOD 30 MIN: CPT | Performed by: FAMILY MEDICINE

## 2022-04-07 NOTE — PATIENT INSTRUCTIONS
Dedo en gatillo   LO QUE NECESITA SABER:   El dedo en gatillo ocurre cuando tushar de los dedos de la mano Esteban [place] doblados sin poderse enderezar y hace un ruido seco, un chasquido o crujido cuando trata de estirarlo  INSTRUCCIONES SOBRE [facility]:   Medicamentos:  · AINEs (analgésicos antiinflamatorios no esteroides): Estos medicamentos calman el dolor y la hinchazón  Los AINEs pueden ser comprados si orden médica  Pregunte cuál de estos medicamentos es apropiado para usted y qué dosis debería fawn  Tómelos benjamin se le indique  Cuando no se ele de la [employer], los medicamentos antiinflamatorios no esteroides pueden causar sangrado estomacal o problemas renales  · [place] eric medicamentos benjamin se le haya indicado  Consulte con hines médico si usted sasha que hines medicamento no le está ayudando o si presenta efectos secundarios  Infórmele si es alérgico a cualquier medicamento  Mantenga paola lista actualizada de los [place], las vitaminas y los productos herbales que shane  Incluya los siguientes datos de los medicamentos: cantidad, frecuencia y motivo de administración  Traiga con usted la lista o los envases de las píldoras a eric citas de seguimiento  Lleve la lista de los medicamentos con usted en woo de paola emergencia  Programe paola socrates con hines médico o especialista de la mano benjamin se le indique: Anote eric preguntas para que se acuerde de hacerlas cira eric visitas  Fisioterapia: Un fisioterapeuta le puede enseñar ejercicios para ayudarle a mejorar el movimiento y la fuerza, y para disminuir el dolor  Jared Nagy: Es posible que tenga que usar paola férula o tablilla cira un sita de 6 semanas para mantener el dedo derecho  [place] contribuirá a que las articulaciones del dedo descansen y evitará que doble el dedo mientras duerme  Cuidado de la herida: Cuando le den permitan bañarse, lave cuidadosamente la incisión con agua y [place]   Seque el área y coloque un apósito nuevo y limpio según le indicaron  Cámbiese el vendaje cuando se moje o ensucie  Comuníquese con hines médico o especialista de la mano si:  · Los síntomas no desaparecen o regresan, incluso después del tratamiento  · El dolor, la hinchazón o la rigidez interfieren con eric actividades diarias  · Tiene más dificultad para  el dedo  · Siente un hormigueo en el dedo  · Usted tiene preguntas o inquietudes acerca de hines condición o cuidado  Busque atención médica de inmediato o llame al 911 si:  · No puede  el dedo para nada  · No tiene sensación en el dedo  © [employer] 2022 Information is for End User's use only and may not be sold, redistributed or otherwise used for commercial purposes  All illustrations and images included in CareNotes® are the copyrighted property of [employer]  or [address] [place] [address] es sólo para uso en educación  Hines intención no es darle un consejo médico sobre enfermedades o tratamientos  Colsulte con hines [employer] farmacéutico antes de seguir cualquier régimen médico para saber si es seguro y efectivo para usted

## 2022-04-07 NOTE — LETTER
April 7, 2022     Patient: Rudolph Ocasio   YOB: 1965   Date of Visit: 4/7/2022       To Whom it May Concern:    Rudolph Ocasio is under my professional care  He was seen in my office on 4/7/2022  He may return to work on Friday, April 8 2022  If you have any questions or concerns, please don't hesitate to call           Sincerely,        Curtis Sadler MD

## 2022-04-08 ENCOUNTER — HOSPITAL ENCOUNTER (OUTPATIENT)
Dept: RADIOLOGY | Facility: HOSPITAL | Age: 57
Discharge: HOME | End: 2022-04-08
Payer: MEDICARE

## 2022-04-08 DIAGNOSIS — M25.532 LEFT WRIST PAIN: ICD-10-CM

## 2022-04-08 PROCEDURE — 73110 X-RAY EXAM OF WRIST: CPT

## 2022-04-08 PROCEDURE — 73130 X-RAY EXAM OF HAND: CPT

## 2022-04-18 DIAGNOSIS — Z76.0 MEDICATION REFILL: ICD-10-CM

## 2022-04-18 RX ORDER — MELATONIN
Qty: 30 TABLET | Refills: 3 | Status: SHIPPED | OUTPATIENT
Start: 2022-04-18

## 2022-04-21 ENCOUNTER — APPOINTMENT (OUTPATIENT)
Dept: LAB | Facility: HOSPITAL | Age: 57
End: 2022-04-21
Attending: PODIATRIST
Payer: MEDICARE

## 2022-04-21 DIAGNOSIS — L60.3 MEDIAN CANALIFORM NAIL DYSTROPHY: ICD-10-CM

## 2022-04-21 LAB
ALBUMIN SERPL BCP-MCNC: 4.7 G/DL (ref 3–5.2)
ALP SERPL-CCNC: 121 U/L (ref 43–122)
ALT SERPL W P-5'-P-CCNC: 53 U/L
AST SERPL W P-5'-P-CCNC: 44 U/L (ref 17–59)
BASOPHILS # BLD AUTO: 0.04 THOUSANDS/ΜL (ref 0–0.1)
BASOPHILS NFR BLD AUTO: 1 % (ref 0–1)
BILIRUB DIRECT SERPL-MCNC: 0.36 MG/DL
BILIRUB SERPL-MCNC: 0.69 MG/DL
EOSINOPHIL # BLD AUTO: 0.14 THOUSAND/ΜL (ref 0–0.61)
EOSINOPHIL NFR BLD AUTO: 2 % (ref 0–6)
ERYTHROCYTE [DISTWIDTH] IN BLOOD BY AUTOMATED COUNT: 13 % (ref 11.6–15.1)
HCT VFR BLD AUTO: 44.9 % (ref 36.5–49.3)
HGB BLD-MCNC: 14.8 G/DL (ref 12–17)
IMM GRANULOCYTES # BLD AUTO: 0.02 THOUSAND/UL (ref 0–0.2)
IMM GRANULOCYTES NFR BLD AUTO: 0 % (ref 0–2)
LYMPHOCYTES # BLD AUTO: 2.01 THOUSANDS/ΜL (ref 0.6–4.47)
LYMPHOCYTES NFR BLD AUTO: 25 % (ref 14–44)
MCH RBC QN AUTO: 28.9 PG (ref 26.8–34.3)
MCHC RBC AUTO-ENTMCNC: 33 G/DL (ref 31.4–37.4)
MCV RBC AUTO: 88 FL (ref 82–98)
MONOCYTES # BLD AUTO: 0.52 THOUSAND/ΜL (ref 0.17–1.22)
MONOCYTES NFR BLD AUTO: 7 % (ref 4–12)
NEUTROPHILS # BLD AUTO: 5.22 THOUSANDS/ΜL (ref 1.85–7.62)
NEUTS SEG NFR BLD AUTO: 65 % (ref 43–75)
NRBC BLD AUTO-RTO: 0 /100 WBCS
PLATELET # BLD AUTO: 160 THOUSANDS/UL (ref 149–390)
PMV BLD AUTO: 12.2 FL (ref 8.9–12.7)
PROT SERPL-MCNC: 8.2 G/DL (ref 5.9–8.4)
RBC # BLD AUTO: 5.12 MILLION/UL (ref 3.88–5.62)
WBC # BLD AUTO: 7.95 THOUSAND/UL (ref 4.31–10.16)

## 2022-04-21 PROCEDURE — 36415 COLL VENOUS BLD VENIPUNCTURE: CPT

## 2022-04-21 PROCEDURE — 85025 COMPLETE CBC W/AUTO DIFF WBC: CPT

## 2022-04-21 PROCEDURE — 80076 HEPATIC FUNCTION PANEL: CPT

## 2022-07-20 ENCOUNTER — OFFICE VISIT (OUTPATIENT)
Dept: FAMILY MEDICINE CLINIC | Facility: CLINIC | Age: 57
End: 2022-07-20

## 2022-07-20 VITALS
HEART RATE: 82 BPM | OXYGEN SATURATION: 96 % | SYSTOLIC BLOOD PRESSURE: 158 MMHG | HEIGHT: 67 IN | WEIGHT: 198 LBS | DIASTOLIC BLOOD PRESSURE: 82 MMHG | TEMPERATURE: 98.3 F | BODY MASS INDEX: 31.08 KG/M2 | RESPIRATION RATE: 18 BRPM

## 2022-07-20 DIAGNOSIS — I10 PRIMARY HYPERTENSION: Primary | ICD-10-CM

## 2022-07-20 DIAGNOSIS — E11.9 TYPE 2 DIABETES MELLITUS WITHOUT COMPLICATION, WITHOUT LONG-TERM CURRENT USE OF INSULIN (HCC): ICD-10-CM

## 2022-07-20 LAB — SL AMB POCT HEMOGLOBIN AIC: 6.5 (ref ?–6.5)

## 2022-07-20 PROCEDURE — 83036 HEMOGLOBIN GLYCOSYLATED A1C: CPT | Performed by: FAMILY MEDICINE

## 2022-07-20 PROCEDURE — 99213 OFFICE O/P EST LOW 20 MIN: CPT | Performed by: FAMILY MEDICINE

## 2022-07-20 RX ORDER — LISINOPRIL AND HYDROCHLOROTHIAZIDE 12.5; 1 MG/1; MG/1
1 TABLET ORAL DAILY
Qty: 30 TABLET | Refills: 5 | Status: SHIPPED | OUTPATIENT
Start: 2022-07-20 | End: 2022-07-20

## 2022-07-20 RX ORDER — LISINOPRIL 20 MG/1
20 TABLET ORAL DAILY
Qty: 90 TABLET | Refills: 0 | Status: SHIPPED | OUTPATIENT
Start: 2022-07-20

## 2022-07-20 NOTE — ASSESSMENT & PLAN NOTE
Assessment:   · Blood pressure at todays office visit 158/82 mmHg, suboptimally controlled  · Blood Pressure goal as per JNC-8 less than 140/90 mmHg,     Plan:   · Will start lisinopril 20 mg daily and reassess in 2 weeks with a nurse office visit  · BP goals reviewed with patient  · The patient was educated on the importance of medication compliance and to monitor her blood pressure at home daily in a quiet room; after resting for at least 5 minutes and to bring the logs at next visit  · Will recommend performing aerobic exercise for at least more than 3 times per week  · Will recommend sodium and fat reduction and  Increase  in fruit consumption

## 2022-07-20 NOTE — PROGRESS NOTES
Assessment/Plan:    Type 2 diabetes mellitus without complication, without long-term current use of insulin (HCC)    Lab Results   Component Value Date    HGBA1C 6 5 04/07/2022   Assessment:   Goal less than 7%, with FBG between 7-130 mg/d and 2 hr postprandial glucose of less than 180 mg/dl, controlled  Currently on metformin 1000 mg bid, compliant  Plan: Will continue current treatment  Will reassess A1C in 6 months   A1C goals reviewed with patient  Encourage life style changes including diabetes diet and exercise  Will f/u in 2 weeks  for Iris exam      Primary hypertension  Assessment:   · Blood pressure at todays office visit 158/82 mmHg, suboptimally controlled  · Blood Pressure goal as per JNC-8 less than 140/90 mmHg,     Plan:   · Will start lisinopril 20 mg daily and reassess in 2 weeks with a nurse office visit  · BP goals reviewed with patient  · The patient was educated on the importance of medication compliance and to monitor her blood pressure at home daily in a quiet room; after resting for at least 5 minutes and to bring the logs at next visit  · Will recommend performing aerobic exercise for at least more than 3 times per week  · Will recommend sodium and fat reduction and  Increase  in fruit consumption  Diagnoses and all orders for this visit:    Primary hypertension  -     Discontinue: lisinopril-hydrochlorothiazide (PRINZIDE,ZESTORETIC) 10-12 5 MG per tablet; Take 1 tablet by mouth daily  -     Lipid Panel with Direct LDL reflex; Future  -     lisinopril (ZESTRIL) 20 mg tablet; Take 1 tablet (20 mg total) by mouth daily    Type 2 diabetes mellitus without complication, without long-term current use of insulin (HCC)  -     IRIS Diabetic eye exam  -     POCT hemoglobin A1c          Subjective:      Patient ID: Dillon Ibanez is a 56 y o  male  Dillon Ibanez is a 56 y  Horacio Quitter with PMH significant for hx of Hepatitis C, DM type 2, obesity, hyperlipidemia, and erectile dysfunction Here for follow up of his chronic conditions  Denies fatigue, headaches, dizziness, blurred vision, nausea, palpitation, chest pain, SOB, urinary changes, weakness, bowel changes, sleep problems,  sick contacts or recent travel  Patient's medical conditions are stable unless noted otherwise above   Patient has not had any recent hospitalizations, or medical emergencies since last visit  Patient reports being compliant with his medications  Overall patient reports feeling well  Patient has no further complaints other than what is mentioned in the ROS  Diabetes  He presents for his follow-up diabetic visit  He has type 2 diabetes mellitus  Pertinent negatives for diabetes include no fatigue  Hypertension  Pertinent negatives include no palpitations  The following portions of the patient's history were reviewed and updated as appropriate: allergies, current medications, past family history, past medical history, past social history, past surgical history and problem list     Review of Systems   Constitutional: Negative for activity change, appetite change, chills, fatigue, fever and unexpected weight change  Cardiovascular: Negative for palpitations  Gastrointestinal: Negative for abdominal distention, abdominal pain, nausea and vomiting  Genitourinary: Positive for dysuria, frequency and urgency  Negative for decreased urine volume, difficulty urinating, enuresis, flank pain and hematuria  Musculoskeletal: Negative for back pain  Neurological: Negative for light-headedness  Objective:      /82 (BP Location: Left arm, Patient Position: Sitting, Cuff Size: Standard)   Pulse 82   Temp 98 3 °F (36 8 °C) (Temporal)   Resp 18   Ht 5' 6 5" (1 689 m)   Wt 89 8 kg (198 lb)   SpO2 96%   BMI 31 48 kg/m²          Physical Exam  Vitals and nursing note reviewed  Constitutional:       General: He is not in acute distress  Appearance: He is well-developed  He is not ill-appearing, toxic-appearing or diaphoretic  HENT:      Head: Normocephalic and atraumatic  Eyes:      General: No scleral icterus  Extraocular Movements: Extraocular movements intact  Cardiovascular:      Rate and Rhythm: Normal rate and regular rhythm  No extrasystoles are present  Pulses:           Radial pulses are 2+ on the right side and 2+ on the left side  Heart sounds: Normal heart sounds, S1 normal and S2 normal  No murmur heard  No friction rub  No gallop  Pulmonary:      Effort: Pulmonary effort is normal  No respiratory distress  Breath sounds: Normal breath sounds and air entry  Abdominal:      General: Bowel sounds are normal       Palpations: Abdomen is soft  There is no mass  Tenderness: There is no abdominal tenderness  There is no right CVA tenderness, left CVA tenderness, guarding or rebound  Musculoskeletal:         General: No swelling, tenderness, deformity or signs of injury  Normal range of motion  Cervical back: Normal range of motion  Right lower leg: No edema  Left lower leg: No edema  Feet:      Right foot:      Skin integrity: No ulcer, skin breakdown, erythema, warmth, callus or dry skin  Left foot:      Skin integrity: No ulcer, skin breakdown, erythema, warmth, callus or dry skin  Skin:     General: Skin is warm  Findings: No rash  Neurological:      General: No focal deficit present  Mental Status: He is alert

## 2022-07-20 NOTE — ASSESSMENT & PLAN NOTE
Lab Results   Component Value Date    HGBA1C 6 5 04/07/2022   Assessment:   Goal less than 7%, with FBG between 7-130 mg/d and 2 hr postprandial glucose of less than 180 mg/dl, controlled  Currently on metformin 1000 mg bid, compliant  Plan: Will continue current treatment  Will reassess A1C in 6 months   A1C goals reviewed with patient  Encourage life style changes including diabetes diet and exercise     Will f/u in 2 weeks  for Iris exam

## 2022-08-19 DIAGNOSIS — E11.9 TYPE 2 DIABETES MELLITUS WITHOUT COMPLICATION, WITHOUT LONG-TERM CURRENT USE OF INSULIN (HCC): ICD-10-CM

## 2022-08-19 DIAGNOSIS — Z76.0 MEDICATION REFILL: ICD-10-CM

## 2022-08-22 RX ORDER — MELATONIN
Qty: 30 TABLET | Refills: 3 | Status: SHIPPED | OUTPATIENT
Start: 2022-08-22

## 2022-10-21 DIAGNOSIS — E78.49 OTHER HYPERLIPIDEMIA: ICD-10-CM

## 2022-10-21 RX ORDER — ATORVASTATIN CALCIUM 10 MG/1
TABLET, FILM COATED ORAL
Qty: 30 TABLET | Refills: 3 | Status: SHIPPED | OUTPATIENT
Start: 2022-10-21

## 2022-10-29 ENCOUNTER — APPOINTMENT (OUTPATIENT)
Dept: LAB | Facility: HOSPITAL | Age: 57
End: 2022-10-29

## 2022-10-29 DIAGNOSIS — Z13.21 ENCOUNTER FOR VITAMIN DEFICIENCY SCREENING: ICD-10-CM

## 2022-10-29 DIAGNOSIS — I10 PRIMARY HYPERTENSION: ICD-10-CM

## 2022-10-29 LAB
25(OH)D3 SERPL-MCNC: 26.2 NG/ML (ref 30–100)
CHOLEST SERPL-MCNC: 119 MG/DL
HDLC SERPL-MCNC: 39 MG/DL
LDLC SERPL CALC-MCNC: 63 MG/DL
TRIGL SERPL-MCNC: 84 MG/DL

## 2022-11-01 NOTE — ASSESSMENT & PLAN NOTE
A1C Today: 6 6, from 6 5 at prior visit  Currently on Metformin 1000 mg BID and compliant  Foot exam within normal limits  No medication side effects      - Continue Metformin 1000 mg BID   - Continue Lipitor 10 mg daily   - A1C goals reviewed with patient  - Encourage life style changes including diabetes diet and exercise

## 2022-11-01 NOTE — PROGRESS NOTES
Name: Will Mackey      : 1965      MRN: [id number]  Encounter Provider: Va Harris MD  Encounter Date: 11/3/2022   Encounter department: [address] [place] [address]     1  Type 2 diabetes mellitus without complication, without long-term current use of insulin ([facility])  Assessment & Plan:  A1C Today: 6 6, from 6 5 at prior visit  Currently on Metformin 1000 mg BID and compliant  Foot exam within normal limits  No medication side effects      - Continue Metformin 1000 mg BID   - Continue Lipitor 10 mg daily   - A1C goals reviewed with patient  - Encourage life style changes including diabetes diet and exercise  Orders:  -     POCT hemoglobin A1c    2  Encounter for immunization  -     influenza vaccine, quadrivalent, recombinant, PF, 0 5 mL, for patients 18 yr+ (FLUBLOK)    3  Other hyperlipidemia  -     atorvastatin (LIPITOR) 10 mg tablet; Take 1 tablet (10 mg total) by mouth daily    4  Primary hypertension  Assessment & Plan:  BP Today: 132/86  At goal   CMP from 2021 within normal limits  - Continue Lisinopril 20 mg daily (started at prior visit)  - Urine M/C ordered  Orders:  -     Microalbumin / creatinine urine ratio  -     lisinopril (ZESTRIL) 20 mg tablet; Take 1 tablet (20 mg total) by mouth daily    5  Medication refill  -     cholecalciferol (VITAMIN D3) 1,000 units tablet; Take 1 tablet (1,000 Units total) by mouth daily       Encounter for 's License:    Patient came in today for a 's license/permit physical  Their past medical history is significant for HTN and DM    Patient does not have any of the following that would prevent control of a motor vehicle: neurological disease, neuropsychiatric disease, circulatory disease, cardiac disease, hypertension, uncontrolled epilepsy, uncontrolled diabetes, cognitive impairment, alcohol abuse, drug abuse, or conditions causing repeated lapses of consciousness (epilepsy, narcolepsy, hysteria, etc)  - Discussed importance of seat belt safety for  and all passengers in the car  Discussed importance of patient’s knowledge of car seat and booster seat use when applicable  - Advised patient not to use alcohol, drugs, or substances which inhibit ability to operate a vehicle  - Advised patient not to use a cell phone or other devices that can be distracting while operating a vehicle      - Form filled out, signed, and handed back to the patient  Subjective      This is a very pleasant 56 y o  male with PMH of HTN and T2DM who presents to the clinic for management of their chronic medical conditions  Patient's medical conditions are stable unless noted otherwise above  Patient has not had any recent hospitalizations or medical emergencies since last visit  Patient has no further complaints other than what is mentioned in the review of systems   Rodger aMrquez [id number] used for this visit  Review of Systems   Constitutional: Negative for activity change, appetite change, chills, fatigue and fever  Respiratory: Negative for cough and shortness of breath  Cardiovascular: Negative for chest pain, palpitations and leg swelling  Gastrointestinal: Negative for abdominal pain, constipation, diarrhea, nausea and vomiting  Musculoskeletal: Negative for back pain and gait problem  Neurological: Negative for dizziness, seizures, weakness, light-headedness and headaches         Current Outpatient Medications on File Prior to Visit   Medication Sig   • Blood Glucose Monitoring Suppl (OneTouch Verio) w/Device KIT Use daily   • erythromycin (ILOTYCIN) ophthalmic ointment Administer 0 5 inches into the left eye every 6 (six) hours   • glucose blood (OneTouch Verio) test strip USE TO CHECK BLOOD SUGAR EVERY MORNING   • Lancets (OneTouch Delica Plus [id number]) MISC USE EVERY MORNING   • latanoprost (XALATAN) 0 005 % ophthalmic solution Administer 1 drop to both eyes daily   • metFORMIN (GLUCOPHAGE) 1000 MG tablet TAKE 1 TABLET(1000 MG) BY MOUTH TWICE DAILY WITH MEALS   • [DISCONTINUED] cholecalciferol (VITAMIN D3) 1,000 units tablet TAKE 1 TABLET BY MOUTH DAILY   • [DISCONTINUED] lisinopril (ZESTRIL) 20 mg tablet Take 1 tablet (20 mg total) by mouth daily       Objective     /86 (BP Location: Left arm, Patient Position: Sitting, Cuff Size: Adult)   Pulse 84   Temp 97 6 °F (36 4 °C) (Temporal)   Resp 16   Ht 5' 6 5" (1 689 m)   Wt 92 4 kg (203 lb 9 6 oz)   SpO2 97%   BMI 32 37 kg/m²     Physical Exam  Vitals reviewed  Constitutional:       General: He is not in acute distress  Appearance: He is well-developed  He is not diaphoretic  HENT:      Head: Normocephalic and atraumatic  Eyes:      Conjunctiva/sclera: Conjunctivae normal    Cardiovascular:      Rate and Rhythm: Normal rate and regular rhythm  Heart sounds: Normal heart sounds  No murmur heard  No friction rub  No gallop  Pulmonary:      Effort: Pulmonary effort is normal  No respiratory distress  Breath sounds: Normal breath sounds  No wheezing or rales  Abdominal:      General: Bowel sounds are normal  There is no distension  Palpations: Abdomen is soft  There is no mass  Tenderness: There is no abdominal tenderness  There is no guarding  Musculoskeletal:         General: No tenderness or deformity  Normal range of motion  Cervical back: Normal range of motion  Skin:     General: Skin is warm and dry  Neurological:      Mental Status: He is alert and oriented to person, place, and time         Yamile Conde MD   11/3/2022

## 2022-11-01 NOTE — ASSESSMENT & PLAN NOTE
BP Today: 132/86  At goal   CMP from 12/2021 within normal limits  - Continue Lisinopril 20 mg daily (started at prior visit)  - Urine M/C ordered

## 2022-11-03 ENCOUNTER — OFFICE VISIT (OUTPATIENT)
Dept: FAMILY MEDICINE CLINIC | Facility: CLINIC | Age: 57
End: 2022-11-03

## 2022-11-03 VITALS
BODY MASS INDEX: 31.96 KG/M2 | SYSTOLIC BLOOD PRESSURE: 132 MMHG | DIASTOLIC BLOOD PRESSURE: 86 MMHG | RESPIRATION RATE: 16 BRPM | WEIGHT: 203.6 LBS | TEMPERATURE: 97.6 F | HEART RATE: 84 BPM | OXYGEN SATURATION: 97 % | HEIGHT: 67 IN

## 2022-11-03 DIAGNOSIS — Z76.0 MEDICATION REFILL: ICD-10-CM

## 2022-11-03 DIAGNOSIS — E11.9 TYPE 2 DIABETES MELLITUS WITHOUT COMPLICATION, WITHOUT LONG-TERM CURRENT USE OF INSULIN (HCC): Primary | ICD-10-CM

## 2022-11-03 DIAGNOSIS — E78.49 OTHER HYPERLIPIDEMIA: ICD-10-CM

## 2022-11-03 DIAGNOSIS — Z23 ENCOUNTER FOR IMMUNIZATION: ICD-10-CM

## 2022-11-03 DIAGNOSIS — I10 PRIMARY HYPERTENSION: ICD-10-CM

## 2022-11-03 LAB — SL AMB POCT HEMOGLOBIN AIC: 6.6 (ref ?–6.5)

## 2022-11-03 RX ORDER — MELATONIN
1000 DAILY
Qty: 30 TABLET | Refills: 3 | Status: SHIPPED | OUTPATIENT
Start: 2022-11-03

## 2022-11-03 RX ORDER — ATORVASTATIN CALCIUM 10 MG/1
10 TABLET, FILM COATED ORAL DAILY
Qty: 90 TABLET | Refills: 1 | Status: SHIPPED | OUTPATIENT
Start: 2022-11-03

## 2022-11-03 RX ORDER — LISINOPRIL 20 MG/1
20 TABLET ORAL DAILY
Qty: 90 TABLET | Refills: 0 | Status: SHIPPED | OUTPATIENT
Start: 2022-11-03

## 2022-11-03 RX ORDER — ATORVASTATIN CALCIUM 40 MG/1
40 TABLET, FILM COATED ORAL DAILY
Qty: 30 TABLET | Refills: 5 | Status: SHIPPED | OUTPATIENT
Start: 2022-11-03 | End: 2022-11-03

## 2022-11-03 RX ORDER — ATORVASTATIN CALCIUM 10 MG/1
10 TABLET, FILM COATED ORAL DAILY
Qty: 90 TABLET | Refills: 0 | Status: SHIPPED | OUTPATIENT
Start: 2022-11-03 | End: 2022-11-03

## 2022-11-03 NOTE — LETTER
November 3, 2022     Patient: Kasey Fowler  YOB: 1965  Date of Visit: 11/3/2022      To Whom it May Concern:    Kasey Fowler is under my professional care  Vilma Friends was seen in my office on 11/3/2022  If you have any questions or concerns, please don't hesitate to call           Sincerely,        Onelia Ibanez MD  11/3/2022

## 2022-11-04 ENCOUNTER — TELEPHONE (OUTPATIENT)
Dept: FAMILY MEDICINE CLINIC | Facility: CLINIC | Age: 57
End: 2022-11-04

## 2022-11-04 NOTE — TELEPHONE ENCOUNTER
----- Message from Margarito Jones MD sent at 11/3/2022  9:20 PM EDT -----  Hello! Can you please call this patient and inform him that he can continue taking his Atorvastatin at 10 mg instead of the 40 mg we discussed? Patient is Green[place]ic speaking  Thank you!

## 2022-11-11 DIAGNOSIS — E78.49 OTHER HYPERLIPIDEMIA: ICD-10-CM

## 2022-11-11 RX ORDER — ATORVASTATIN CALCIUM 10 MG/1
10 TABLET, FILM COATED ORAL DAILY
Qty: 90 TABLET | Refills: 1 | OUTPATIENT
Start: 2022-11-11

## 2022-12-01 ENCOUNTER — VBI (OUTPATIENT)
Dept: ADMINISTRATIVE | Facility: OTHER | Age: 57
End: 2022-12-01

## 2022-12-22 DIAGNOSIS — E11.9 TYPE 2 DIABETES MELLITUS WITHOUT COMPLICATION, WITHOUT LONG-TERM CURRENT USE OF INSULIN (HCC): ICD-10-CM

## 2023-02-06 DIAGNOSIS — I10 PRIMARY HYPERTENSION: ICD-10-CM

## 2023-02-08 RX ORDER — LISINOPRIL 20 MG/1
TABLET ORAL
Qty: 90 TABLET | Refills: 0 | Status: SHIPPED | OUTPATIENT
Start: 2023-02-08

## 2023-03-22 ENCOUNTER — OFFICE VISIT (OUTPATIENT)
Dept: FAMILY MEDICINE CLINIC | Facility: CLINIC | Age: 58
End: 2023-03-22

## 2023-03-22 VITALS
DIASTOLIC BLOOD PRESSURE: 76 MMHG | HEIGHT: 67 IN | WEIGHT: 200 LBS | HEART RATE: 87 BPM | SYSTOLIC BLOOD PRESSURE: 128 MMHG | TEMPERATURE: 97.5 F | BODY MASS INDEX: 31.39 KG/M2 | RESPIRATION RATE: 16 BRPM | OXYGEN SATURATION: 96 %

## 2023-03-22 DIAGNOSIS — H10.32 ACUTE BACTERIAL CONJUNCTIVITIS OF LEFT EYE: ICD-10-CM

## 2023-03-22 DIAGNOSIS — E11.9 TYPE 2 DIABETES MELLITUS WITHOUT COMPLICATION, WITHOUT LONG-TERM CURRENT USE OF INSULIN (HCC): Primary | ICD-10-CM

## 2023-03-22 DIAGNOSIS — I10 PRIMARY HYPERTENSION: ICD-10-CM

## 2023-03-22 DIAGNOSIS — Z76.0 MEDICATION REFILL: ICD-10-CM

## 2023-03-22 DIAGNOSIS — E78.49 OTHER HYPERLIPIDEMIA: ICD-10-CM

## 2023-03-22 LAB — SL AMB POCT HEMOGLOBIN AIC: 7 (ref ?–6.5)

## 2023-03-22 RX ORDER — ERYTHROMYCIN 5 MG/G
0.5 OINTMENT OPHTHALMIC EVERY 6 HOURS SCHEDULED
Qty: 3.5 G | Refills: 0 | Status: SHIPPED | OUTPATIENT
Start: 2023-03-22

## 2023-03-22 RX ORDER — LISINOPRIL 20 MG/1
20 TABLET ORAL DAILY
Qty: 90 TABLET | Refills: 0 | Status: SHIPPED | OUTPATIENT
Start: 2023-03-22

## 2023-03-22 RX ORDER — ATORVASTATIN CALCIUM 10 MG/1
10 TABLET, FILM COATED ORAL DAILY
Qty: 90 TABLET | Refills: 1 | Status: SHIPPED | OUTPATIENT
Start: 2023-03-22

## 2023-03-22 RX ORDER — LATANOPROST 50 UG/ML
1 SOLUTION/ DROPS OPHTHALMIC DAILY
Qty: 2.5 ML | Refills: 3 | Status: SHIPPED | OUTPATIENT
Start: 2023-03-22

## 2023-03-22 RX ORDER — MELATONIN
1000 DAILY
Qty: 30 TABLET | Refills: 3 | Status: SHIPPED | OUTPATIENT
Start: 2023-03-22

## 2023-03-22 NOTE — PROGRESS NOTES
Name: June Regalado      : 1965      MRN: [id number]  Encounter Provider: Vivek Herndon MD  Encounter Date: 3/22/2023   Encounter department: [address] [place] [address]  Type 2 diabetes mellitus without complication, without long-term current use of insulin ([facility])  Assessment & Plan:    Lab Results   Component Value Date    HGBA1C 7 0 (A) 2023   Assessment:   · Goal less than 7%, with FBG between 7-130 mg/d and 2 hr postprandial glucose of less than 180 mg/dl, controlled  · Currently on metformin 1000 mg bid, compliant, denies side effects   · Blood pressure controlled     Plan:   · Will continue current treatment   · A1C goals reviewed with patient  · Encourage life style changes including diabetes diet and exercise  · Will repeat a1c every  3 months until goal is reached, then q6 months  · Will f/u in 2 months for PE/Iris exam      Orders:  -     Pneumococcal Conjugate Vaccine 20-valent (Pcv20)  -     Comprehensive metabolic panel; Future  -     metFORMIN (GLUCOPHAGE) 1000 MG tablet; Take 1 tablet (1,000 mg total) by mouth 2 (two) times a day with meals  -     POCT hemoglobin A1c    2  Primary hypertension  Assessment & Plan:  Assessment:   · Blood pressure at today’s office visit 128/76mmHg, controlled  · Currently on lisinopril 20 mg qdposc, compliant, denies side effects     Plan:   · Continue current treatment   · BP goals and possible side effects reviewed with patient, educated on the importance of medication compliance and blood pressure monitoring/diary at home  · Recommended to  bring BP diary in visit  · Will recommend performing aerobic exercise for at least more than 3 times per week or more that 150 min x week of moderate physical activity  · Will recommend sodium and fat reduction and to increase fruit consumption  · Reassess BP in at his next visit  Orders:  -     lisinopril (ZESTRIL) 20 mg tablet;  Take 1 tablet (20 mg total) by mouth daily    3  Acute bacterial conjunctivitis of left eye  -     erythromycin (ILOTYCIN) ophthalmic ointment; Administer 0 5 inches into the left eye every 6 (six) hours    4  Medication refill  -     latanoprost (XALATAN) 0 005 % ophthalmic solution; Administer 1 drop to both eyes daily  -     cholecalciferol (VITAMIN D3) 1,000 units tablet; Take 1 tablet (1,000 Units total) by mouth daily    5  Other hyperlipidemia  -     atorvastatin (LIPITOR) 10 mg tablet; Take 1 tablet (10 mg total) by mouth daily    BMI Counseling: Body mass index is 31 8 kg/m²  The BMI is above normal  Nutrition recommendations include decreasing portion sizes, encouraging healthy choices of fruits and vegetables, decreasing fast food intake, consuming healthier snacks, limiting drinks that contain sugar, moderation in carbohydrate intake, increasing intake of lean protein, reducing intake of saturated and trans fat and reducing intake of cholesterol  Exercise recommendations include moderate physical activity 150 minutes/week, vigorous physical activity 75 minutes/week, exercising 3-5 times per week, obtaining a gym membership and strength training exercises  Rationale for BMI follow-up plan is due to patient being overweight or obese  Subjective      It was a pleasure to CORYJOE Tyron, a 56 y o   male who presents for Patient presents for Hypertension and management of his chronic medical condition(s)     Patient's medical conditions are stable unless noted otherwise above   Patient has not had any recent hospitalizations, or medical emergencies since last visit  Patient reports compliance with his medications, denies side effects  Overall patient reports feeling well with no further complaint(s) other than what is mentioned  Denies tobacco, alcohol and illicit drug consumption  Hypertension  This is a chronic problem  The problem has been gradually improving since onset  The problem is controlled  "Pertinent negatives include no anxiety, blurred vision, chest pain, headaches, malaise/fatigue, neck pain, orthopnea, palpitations, peripheral edema, PND, shortness of breath or sweats  The current treatment provides significant improvement  There are no compliance problems  Review of Systems   Constitutional: Negative for activity change, appetite change, chills, fatigue, fever and malaise/fatigue  HENT: Negative for congestion, postnasal drip, rhinorrhea and sore throat  Eyes: Negative for blurred vision and visual disturbance  Respiratory: Negative for cough, chest tightness, shortness of breath and wheezing  Cardiovascular: Negative for chest pain, palpitations, orthopnea, leg swelling and PND  Gastrointestinal: Negative for abdominal distention, abdominal pain, blood in stool, constipation, diarrhea, nausea and vomiting  Genitourinary: Negative for difficulty urinating, dysuria and hematuria  Musculoskeletal: Negative for arthralgias, myalgias and neck pain  Skin: Negative for rash  Neurological: Negative for dizziness, syncope, weakness, light-headedness, numbness and headaches  Psychiatric/Behavioral: Negative for agitation, behavioral problems, self-injury, sleep disturbance and suicidal ideas  Current Outpatient Medications on File Prior to Visit   Medication Sig   • Blood Glucose Monitoring Suppl (OneTouch Verio) w/Device KIT Use daily   • glucose blood (OneTouch Verio) test strip USE TO CHECK BLOOD SUGAR EVERY MORNING   • Lancets (OneTouch Delica Plus [id number]) MISC USE EVERY MORNING       Objective     /76 (BP Location: Left arm, Patient Position: Sitting, Cuff Size: Adult)   Pulse 87   Temp 97 5 °F (36 4 °C) (Temporal)   Resp 16   Ht 5' 6 5\" (1 689 m)   Wt 90 7 kg (200 lb)   SpO2 96%   BMI 31 80 kg/m²     Physical Exam  Vitals and nursing note reviewed  Constitutional:       General: He is awake  He is not in acute distress       Appearance: He is " well-developed and well-groomed  He is obese  He is not ill-appearing, toxic-appearing or diaphoretic  HENT:      Head: Normocephalic and atraumatic  Eyes:      General: No scleral icterus  Extraocular Movements: Extraocular movements intact  Cardiovascular:      Rate and Rhythm: Normal rate and regular rhythm  No extrasystoles are present  Pulses:           Radial pulses are 2+ on the right side and 2+ on the left side  Heart sounds: Normal heart sounds, S1 normal and S2 normal  No murmur heard  No friction rub  No gallop  Pulmonary:      Effort: Pulmonary effort is normal  No respiratory distress  Breath sounds: Normal breath sounds and air entry  Abdominal:      General: Bowel sounds are normal       Palpations: Abdomen is soft  There is no mass  Tenderness: There is no abdominal tenderness  There is no right CVA tenderness, left CVA tenderness, guarding or rebound  Musculoskeletal:         General: No swelling, tenderness, deformity or signs of injury  Normal range of motion  Cervical back: Normal range of motion  Right lower leg: No edema  Left lower leg: No edema  Feet:      Right foot:      Skin integrity: No ulcer, skin breakdown, erythema, warmth, callus or dry skin  Left foot:      Skin integrity: No ulcer, skin breakdown, erythema, warmth, callus or dry skin  Skin:     General: Skin is warm  Findings: No rash  Neurological:      General: No focal deficit present  Mental Status: He is alert  Psychiatric:         Behavior: Behavior is cooperative         Alice Medina MD

## 2023-04-02 NOTE — ASSESSMENT & PLAN NOTE
Assessment:   · Blood pressure at today’s office visit 128/76mmHg, controlled  · Currently on lisinopril 20 mg qdposc, compliant, denies side effects     Plan:   · Continue current treatment   · BP goals and possible side effects reviewed with patient, educated on the importance of medication compliance and blood pressure monitoring/diary at home  · Recommended to  bring BP diary in visit  · Will recommend performing aerobic exercise for at least more than 3 times per week or more that 150 min x week of moderate physical activity  · Will recommend sodium and fat reduction and to increase fruit consumption  · Reassess BP in at his next visit

## 2023-04-02 NOTE — ASSESSMENT & PLAN NOTE
Lab Results   Component Value Date    HGBA1C 7 0 (A) 03/22/2023   Assessment:   · Goal less than 7%, with FBG between 7-130 mg/d and 2 hr postprandial glucose of less than 180 mg/dl, controlled  · Currently on metformin 1000 mg bid, compliant, denies side effects   · Blood pressure controlled     Plan:   · Will continue current treatment   · A1C goals reviewed with patient  · Encourage life style changes including diabetes diet and exercise  · Will repeat a1c every  3 months until goal is reached, then q6 months    · Will f/u in 2 months for PE/Iris exam

## 2024-04-24 PROBLEM — Z12.11 ENCOUNTER FOR SCREENING COLONOSCOPY: Status: RESOLVED | Noted: 2021-01-16 | Resolved: 2024-04-24

## 2024-04-24 NOTE — PROGRESS NOTES
"ADULT ANNUAL PHYSICAL  [facility] [place]    NAME: Raulito Hallman  AGE: 58 y.o. SEX: male  : 1965     DATE: 2024     Assessment and Plan:     Problem List Items Addressed This Visit       Class 1 obesity due to excess calories without serious comorbidity with body mass index (BMI) of 32.0 to 32.9 in adult     BMI Counseling: Body mass index is 32.35 kg/m². The BMI is above normal. Nutrition recommendations include reducing portion sizes, decreasing overall calorie intake, 3-5 servings of fruits/vegetables daily, reducing fast food intake, consuming healthier snacks, decreasing soda and/or juice intake, moderation in carbohydrate intake, increasing intake of lean protein, reducing intake of saturated fat and trans fat, and reducing intake of cholesterol. Exercise recommendations include moderate aerobic physical activity for 150 minutes/week.           Relevant Orders    Comprehensive metabolic panel    CBC and differential    TSH, 3rd generation with Free T4 reflex    Type 2 diabetes mellitus without complication, without long-term current use of insulin (HCC)       Lab Results   Component Value Date    HGBA1C 7.0 (A) 2023   Poct is 6.9.    Continue metformin 1000 mg BID. Schedule ophthalmology visit.         Relevant Orders    Albumin / creatinine urine ratio    POCT hemoglobin A1c    Other hyperlipidemia     Lab Results   Component Value Date    CHOLESTEROL 119 10/29/2022    CHOLESTEROL 117 2021    CHOLESTEROL 175 10/26/2020     Lab Results   Component Value Date    HDL 39 (L) 10/29/2022    HDL 36 (L) 2021    HDL 53 10/26/2020     Lab Results   Component Value Date    TRIG 84 10/29/2022    TRIG 99 2021    TRIG 115 10/26/2020     No results found for: \"NONHDLC\"  Lab Results   Component Value Date    LDLCALC 63 10/29/2022     Check lipid panel, increase lipitor to Lipitor 40 mg daily given DM.          Relevant " Medications    atorvastatin (LIPITOR) 40 mg tablet    Other Relevant Orders    Lipid panel    Primary hypertension     BP Readings from Last 3 Encounters:   04/25/24 156/90   03/22/23 128/76   11/03/22 132/86     Not at goal. Increase exforge to 5-320 mg daily  -reviewed eating a low salt diet (such as the DASH diet), limiting alcohol, exercising, and wt loss.           Relevant Medications    amLODIPine-valsartan (EXFORGE) 5-320 MG per tablet     Other Visit Diagnoses       Annual physical exam    -  Primary    Screening PSA (prostate specific antigen)        Relevant Orders    PSA, Total Screen            Immunizations and preventive care screenings were discussed with patient today. Appropriate education was printed on patient's after visit summary.        Counseling:  Alcohol/drug use: discussed moderation in alcohol intake, the recommendations for healthy alcohol use, and avoidance of illicit drug use.  Dental Health: discussed importance of regular tooth brushing, flossing, and dental visits.  Exercise: the importance of regular exercise/physical activity was discussed. Recommend exercise 3-5 times per week for at least 30 minutes.     BMI Counseling: Body mass index is 32.35 kg/m². The BMI is above normal. Nutrition recommendations include decreasing portion sizes, encouraging healthy choices of fruits and vegetables, decreasing fast food intake, consuming healthier snacks, limiting drinks that contain sugar, moderation in carbohydrate intake, increasing intake of lean protein, reducing intake of saturated and trans fat and reducing intake of cholesterol. Exercise recommendations include moderate physical activity 150 minutes/week. No pharmacotherapy was ordered. Rationale for BMI follow-up plan is due to patient being overweight or obese.     Depression Screening and Follow-up Plan: Patient was screened for depression during today's encounter. They screened negative with a PHQ-2 score of 0.        Return in  about 1 month (around 5/25/2024) for htn - rn visit & dm f/u w me in 4 months.     Chief Complaint:     Chief Complaint   Patient presents with    Physical Exam     58 y.o. HTN and DM      History of Present Illness:     Adult Annual Physical   Patient here for a comprehensive physical exam. He reports that he was receiving care in [place] and has been taking exforge 5-160 mg daily, lipitor 20 mg daily, & metformin 1000 mg daily. He also reports that he had a CBC done there and had an abnormal plt count. He was referred to hem onc but did not go because he moved back. He would like labs checked again.     Diet and Physical Activity  Diet/Nutrition: poor diet and low carb diet.   Exercise: no formal exercise.      Depression Screening  PHQ-2/9 Depression Screening    Little interest or pleasure in doing things: 0 - not at all  Feeling down, depressed, or hopeless: 0 - not at all  PHQ-2 Score: 0  PHQ-2 Interpretation: Negative depression screen       General Health  Sleep: sleeps well and gets 7-8 hours of sleep on average.   Hearing: normal - bilateral.  Vision: most recent eye exam >1 year ago and wears glasses.   Dental: no dental visits for >1 year, brushes teeth twice daily, and flosses teeth occasionally.        Health  Symptoms include: none         Review of Systems:     Review of Systems   Constitutional:  Negative for chills and fever.   HENT:  Negative for ear pain and sore throat.    Eyes:  Negative for pain and visual disturbance.   Respiratory:  Negative for cough and shortness of breath.    Cardiovascular:  Negative for chest pain and palpitations.   Gastrointestinal:  Negative for abdominal pain and vomiting.   Genitourinary:  Negative for dysuria and hematuria.   Musculoskeletal:  Negative for arthralgias and back pain.   Skin:  Negative for color change and rash.   Neurological:  Negative for seizures and syncope.   All other systems reviewed and are negative.     Past Medical History:     Past  Medical History:   Diagnosis Date    Diabetes mellitus (HCC)     Glaucoma     Hypertension       Past Surgical History:     Past Surgical History:   Procedure Laterality Date    CARPAL TUNNEL RELEASE      HERNIA REPAIR        Family History:     History reviewed. No pertinent family history.   Social History:     Social History     Socioeconomic History    Marital status: /Civil Union     Spouse name: None    Number of children: None    Years of education: None    Highest education level: None   Occupational History    None   Tobacco Use    Smoking status: Never    Smokeless tobacco: Never   Vaping Use    Vaping status: Never Used   Substance and Sexual Activity    Alcohol use: Never    Drug use: Never    Sexual activity: Yes     Partners: Female   Other Topics Concern    None   Social History Narrative    None     Social Determinants of Health     Financial Resource Strain: Low Risk  (4/25/2024)    Overall Financial Resource Strain (CARDIA)     Difficulty of Paying Living Expenses: Not hard at all   Food Insecurity: No Food Insecurity (4/25/2024)    Hunger Vital Sign     Worried About Running Out of Food in the Last Year: Never true     Ran Out of Food in the Last Year: Never true   Transportation Needs: No Transportation Needs (4/25/2024)    PRAPARE - Transportation     Lack of Transportation (Medical): No     Lack of Transportation (Non-Medical): No   Physical Activity: Not on file   Stress: Not on file   Social Connections: Not on file   Intimate Partner Violence: Not on file   Housing Stability: Low Risk  (4/25/2024)    Housing Stability Vital Sign     Unable to Pay for Housing in the Last Year: No     Number of Places Lived in the Last Year: 1     Unstable Housing in the Last Year: No      Current Medications:     Current Outpatient Medications   Medication Sig Dispense Refill    amLODIPine-valsartan (EXFORGE) 5-320 MG per tablet Take 1 tablet by mouth daily 90 tablet 3    atorvastatin (LIPITOR) 40 mg  "tablet Take 1 tablet (40 mg total) by mouth daily 90 tablet 3    Blood Glucose Monitoring Suppl (OneTouch Verio) w/Device KIT Use daily 1 kit 0    cholecalciferol (VITAMIN D3) 1,000 units tablet Take 1 tablet (1,000 Units total) by mouth daily 30 tablet 3    glucose blood (OneTouch Verio) test strip USE TO CHECK BLOOD SUGAR EVERY MORNING 100 strip 3    Lancets (OneTouch Delica Plus [id number]) MISC USE EVERY MORNING 100 each 1    latanoprost (XALATAN) 0.005 % ophthalmic solution Administer 1 drop to both eyes daily 2.5 mL 3    metFORMIN (GLUCOPHAGE) 1000 MG tablet Take 1 tablet (1,000 mg total) by mouth 2 (two) times a day with meals 60 tablet 3     No current facility-administered medications for this visit.      Allergies:     No Known Allergies   Physical Exam:     /90 (BP Location: Right arm, Patient Position: Sitting, Cuff Size: Standard)   Pulse 89   Temp 98.3 °F (36.8 °C) (Temporal)   Resp 18   Ht 5' 6\" (1.676 m)   Wt 90.9 kg (200 lb 6.4 oz)   SpO2 99%   BMI 32.35 kg/m²     Physical Exam  Vitals and nursing note reviewed.   Constitutional:       General: He is not in acute distress.     Appearance: He is well-developed. He is obese.   HENT:      Head: Normocephalic and atraumatic.      Right Ear: External ear normal.      Left Ear: External ear normal.      Nose: Nose normal.   Eyes:      Conjunctiva/sclera: Conjunctivae normal.   Cardiovascular:      Rate and Rhythm: Normal rate and regular rhythm.      Pulses: Normal pulses. no weak pulses.           Dorsalis pedis pulses are 2+ on the right side and 2+ on the left side.        Posterior tibial pulses are 2+ on the right side and 2+ on the left side.      Heart sounds: Normal heart sounds. No murmur heard.  Pulmonary:      Effort: Pulmonary effort is normal. No respiratory distress.      Breath sounds: Normal breath sounds.   Abdominal:      Palpations: Abdomen is soft.      Tenderness: There is no abdominal tenderness.   Musculoskeletal:        "  General: No swelling. Normal range of motion.      Cervical back: Normal range of motion and neck supple.   Feet:      Right foot:      Skin integrity: No ulcer, skin breakdown, erythema, warmth, callus or dry skin.      Left foot:      Skin integrity: No ulcer, skin breakdown, erythema, warmth, callus or dry skin.   Skin:     General: Skin is warm and dry.      Capillary Refill: Capillary refill takes less than 2 seconds.   Neurological:      General: No focal deficit present.      Mental Status: He is alert and oriented to person, place, and time. Mental status is at baseline.   Psychiatric:         Mood and Affect: Mood normal.         Behavior: Behavior normal.         Thought Content: Thought content normal.         Judgment: Judgment normal.      Patient's shoes and socks removed.    Right Foot/Ankle   Right Foot Inspection  Skin Exam: skin normal and skin intact. No dry skin, no warmth, no callus, no erythema, no maceration, no abnormal color, no pre-ulcer, no ulcer and no callus.     Toe Exam: ROM and strength within normal limits.     Sensory   Vibration: intact  Proprioception: intact  Monofilament testing: intact    Vascular  Capillary refills: < 3 seconds  The right DP pulse is 2+. The right PT pulse is 2+.     Left Foot/Ankle  Left Foot Inspection  Skin Exam: skin normal and skin intact. No dry skin, no warmth, no erythema, no maceration, normal color, no pre-ulcer, no ulcer and no callus.     Toe Exam: ROM and strength within normal limits.     Sensory   Vibration: intact  Proprioception: intact  Monofilament testing: intact    Vascular  Capillary refills: < 3 seconds  The left DP pulse is 2+. The left PT pulse is 2+.     Assign Risk Category  No deformity present  No loss of protective sensation  No weak pulses  Risk: 0        HARISH Noble  [facility] [place]

## 2024-04-25 ENCOUNTER — APPOINTMENT (OUTPATIENT)
Dept: LAB | Facility: CLINIC | Age: 59
End: 2024-04-25
Payer: MEDICARE

## 2024-04-25 ENCOUNTER — OFFICE VISIT (OUTPATIENT)
Dept: FAMILY MEDICINE CLINIC | Facility: CLINIC | Age: 59
End: 2024-04-25

## 2024-04-25 VITALS
TEMPERATURE: 98.3 F | BODY MASS INDEX: 32.21 KG/M2 | HEIGHT: 66 IN | HEART RATE: 89 BPM | RESPIRATION RATE: 18 BRPM | SYSTOLIC BLOOD PRESSURE: 156 MMHG | DIASTOLIC BLOOD PRESSURE: 90 MMHG | OXYGEN SATURATION: 99 % | WEIGHT: 200.4 LBS

## 2024-04-25 DIAGNOSIS — E78.49 OTHER HYPERLIPIDEMIA: ICD-10-CM

## 2024-04-25 DIAGNOSIS — E11.9 TYPE 2 DIABETES MELLITUS WITHOUT COMPLICATION, WITHOUT LONG-TERM CURRENT USE OF INSULIN (HCC): ICD-10-CM

## 2024-04-25 DIAGNOSIS — I10 PRIMARY HYPERTENSION: ICD-10-CM

## 2024-04-25 DIAGNOSIS — Z00.00 ANNUAL PHYSICAL EXAM: Primary | ICD-10-CM

## 2024-04-25 DIAGNOSIS — Z12.5 SCREENING PSA (PROSTATE SPECIFIC ANTIGEN): ICD-10-CM

## 2024-04-25 DIAGNOSIS — E66.09 CLASS 1 OBESITY DUE TO EXCESS CALORIES WITHOUT SERIOUS COMORBIDITY WITH BODY MASS INDEX (BMI) OF 32.0 TO 32.9 IN ADULT: ICD-10-CM

## 2024-04-25 LAB
ALBUMIN SERPL BCP-MCNC: 4.6 G/DL (ref 3.5–5)
ALP SERPL-CCNC: 117 U/L (ref 34–104)
ALT SERPL W P-5'-P-CCNC: 50 U/L (ref 7–52)
ANION GAP SERPL CALCULATED.3IONS-SCNC: 9 MMOL/L (ref 4–13)
AST SERPL W P-5'-P-CCNC: 28 U/L (ref 13–39)
BILIRUB SERPL-MCNC: 0.72 MG/DL (ref 0.2–1)
BUN SERPL-MCNC: 12 MG/DL (ref 5–25)
CALCIUM SERPL-MCNC: 9.2 MG/DL (ref 8.4–10.2)
CHLORIDE SERPL-SCNC: 102 MMOL/L (ref 96–108)
CHOLEST SERPL-MCNC: 89 MG/DL
CO2 SERPL-SCNC: 29 MMOL/L (ref 21–32)
CREAT SERPL-MCNC: 0.76 MG/DL (ref 0.6–1.3)
CREAT UR-MCNC: 142.3 MG/DL
GFR SERPL CREATININE-BSD FRML MDRD: 101 ML/MIN/1.73SQ M
GLUCOSE P FAST SERPL-MCNC: 156 MG/DL (ref 65–99)
HDLC SERPL-MCNC: 54 MG/DL
LDLC SERPL CALC-MCNC: 20 MG/DL (ref 0–100)
MICROALBUMIN UR-MCNC: 34.6 MG/L
MICROALBUMIN/CREAT 24H UR: 24 MG/G CREATININE (ref 0–30)
NONHDLC SERPL-MCNC: 35 MG/DL
POTASSIUM SERPL-SCNC: 3.7 MMOL/L (ref 3.5–5.3)
PROT SERPL-MCNC: 7 G/DL (ref 6.4–8.4)
PSA SERPL-MCNC: 0.7 NG/ML (ref 0–4)
SL AMB POCT HEMOGLOBIN AIC: 6.9 (ref ?–6.5)
SODIUM SERPL-SCNC: 140 MMOL/L (ref 135–147)
TRIGL SERPL-MCNC: 74 MG/DL
TSH SERPL DL<=0.05 MIU/L-ACNC: 1.26 UIU/ML (ref 0.45–4.5)

## 2024-04-25 PROCEDURE — G0103 PSA SCREENING: HCPCS

## 2024-04-25 PROCEDURE — 80053 COMPREHEN METABOLIC PANEL: CPT

## 2024-04-25 PROCEDURE — 84443 ASSAY THYROID STIM HORMONE: CPT

## 2024-04-25 PROCEDURE — 36415 COLL VENOUS BLD VENIPUNCTURE: CPT

## 2024-04-25 PROCEDURE — 83036 HEMOGLOBIN GLYCOSYLATED A1C: CPT

## 2024-04-25 PROCEDURE — 80061 LIPID PANEL: CPT

## 2024-04-25 PROCEDURE — 99396 PREV VISIT EST AGE 40-64: CPT

## 2024-04-25 PROCEDURE — 99214 OFFICE O/P EST MOD 30 MIN: CPT

## 2024-04-25 PROCEDURE — 85025 COMPLETE CBC W/AUTO DIFF WBC: CPT

## 2024-04-25 RX ORDER — ONDANSETRON 4 MG/1
4 TABLET, ORALLY DISINTEGRATING ORAL EVERY 8 HOURS PRN
COMMUNITY
Start: 2024-02-23 | End: 2024-04-25

## 2024-04-25 RX ORDER — AMLODIPINE AND VALSARTAN 5; 320 MG/1; MG/1
1 TABLET ORAL DAILY
Qty: 90 TABLET | Refills: 3 | Status: SHIPPED | OUTPATIENT
Start: 2024-04-25

## 2024-04-25 RX ORDER — ATORVASTATIN CALCIUM 40 MG/1
40 TABLET, FILM COATED ORAL DAILY
Qty: 90 TABLET | Refills: 3 | Status: SHIPPED | OUTPATIENT
Start: 2024-04-25

## 2024-04-25 NOTE — ASSESSMENT & PLAN NOTE
BP Readings from Last 3 Encounters:   04/25/24 156/90   03/22/23 128/76   11/03/22 132/86     Not at goal. Increase exforge to 5-320 mg daily  -reviewed eating a low salt diet (such as the DASH diet), limiting alcohol, exercising, and wt loss.

## 2024-04-25 NOTE — ASSESSMENT & PLAN NOTE
BMI Counseling: Body mass index is 32.35 kg/m². The BMI is above normal. Nutrition recommendations include reducing portion sizes, decreasing overall calorie intake, 3-5 servings of fruits/vegetables daily, reducing fast food intake, consuming healthier snacks, decreasing soda and/or juice intake, moderation in carbohydrate intake, increasing intake of lean protein, reducing intake of saturated fat and trans fat, and reducing intake of cholesterol. Exercise recommendations include moderate aerobic physical activity for 150 minutes/week.

## 2024-04-25 NOTE — ASSESSMENT & PLAN NOTE
Lab Results   Component Value Date    HGBA1C 7.0 (A) 03/22/2023   Poct is 6.9.    Continue metformin 1000 mg BID. Schedule ophthalmology visit.

## 2024-04-25 NOTE — ASSESSMENT & PLAN NOTE
"Lab Results   Component Value Date    CHOLESTEROL 119 10/29/2022    CHOLESTEROL 117 06/12/2021    CHOLESTEROL 175 10/26/2020     Lab Results   Component Value Date    HDL 39 (L) 10/29/2022    HDL 36 (L) 06/12/2021    HDL 53 10/26/2020     Lab Results   Component Value Date    TRIG 84 10/29/2022    TRIG 99 06/12/2021    TRIG 115 10/26/2020     No results found for: \"NONHDLC\"  Lab Results   Component Value Date    LDLCALC 63 10/29/2022     Check lipid panel, increase lipitor to Lipitor 40 mg daily given DM.   "

## 2024-04-26 DIAGNOSIS — D69.6 THROMBOCYTOPENIA (HCC): Primary | ICD-10-CM

## 2024-04-26 LAB
BASOPHILS # BLD AUTO: 0.05 THOUSANDS/ÂΜL (ref 0–0.1)
BASOPHILS NFR BLD AUTO: 1 % (ref 0–1)
EOSINOPHIL # BLD AUTO: 0.11 THOUSAND/ÂΜL (ref 0–0.61)
EOSINOPHIL NFR BLD AUTO: 2 % (ref 0–6)
ERYTHROCYTE [DISTWIDTH] IN BLOOD BY AUTOMATED COUNT: 13.2 % (ref 11.6–15.1)
HCT VFR BLD AUTO: 41.9 % (ref 36.5–49.3)
HGB BLD-MCNC: 14.2 G/DL (ref 12–17)
IMM GRANULOCYTES # BLD AUTO: 0.03 THOUSAND/UL (ref 0–0.2)
IMM GRANULOCYTES NFR BLD AUTO: 1 % (ref 0–2)
LYMPHOCYTES # BLD AUTO: 1.29 THOUSANDS/ÂΜL (ref 0.6–4.47)
LYMPHOCYTES NFR BLD AUTO: 22 % (ref 14–44)
MCH RBC QN AUTO: 29.8 PG (ref 26.8–34.3)
MCHC RBC AUTO-ENTMCNC: 33.9 G/DL (ref 31.4–37.4)
MCV RBC AUTO: 88 FL (ref 82–98)
MONOCYTES # BLD AUTO: 0.45 THOUSAND/ÂΜL (ref 0.17–1.22)
MONOCYTES NFR BLD AUTO: 8 % (ref 4–12)
NEUTROPHILS # BLD AUTO: 3.85 THOUSANDS/ÂΜL (ref 1.85–7.62)
NEUTS SEG NFR BLD AUTO: 66 % (ref 43–75)
NRBC BLD AUTO-RTO: 0 /100 WBCS
PLATELET # BLD AUTO: 49 THOUSANDS/UL (ref 149–390)
PLATELET BLD QL SMEAR: ABNORMAL
RBC # BLD AUTO: 4.77 MILLION/UL (ref 3.88–5.62)
RBC MORPH BLD: NORMAL
WBC # BLD AUTO: 5.78 THOUSAND/UL (ref 4.31–10.16)

## 2024-05-30 ENCOUNTER — CLINICAL SUPPORT (OUTPATIENT)
Dept: FAMILY MEDICINE CLINIC | Facility: CLINIC | Age: 59
End: 2024-05-30

## 2024-05-30 VITALS — DIASTOLIC BLOOD PRESSURE: 82 MMHG | SYSTOLIC BLOOD PRESSURE: 134 MMHG

## 2024-05-30 DIAGNOSIS — I10 PRIMARY HYPERTENSION: Primary | ICD-10-CM

## 2024-05-30 NOTE — PROGRESS NOTES
Pt here for a bp check.   Pt states that it has been a month since starting his new medication for hypertension.   Pt confirms he took his HTN medication today.

## 2024-06-11 ENCOUNTER — OFFICE VISIT (OUTPATIENT)
Dept: HEMATOLOGY ONCOLOGY | Facility: CLINIC | Age: 59
End: 2024-06-11
Payer: MEDICARE

## 2024-06-11 ENCOUNTER — TELEPHONE (OUTPATIENT)
Dept: HEMATOLOGY ONCOLOGY | Facility: CLINIC | Age: 59
End: 2024-06-11

## 2024-06-11 ENCOUNTER — APPOINTMENT (OUTPATIENT)
Dept: LAB | Facility: HOSPITAL | Age: 59
End: 2024-06-11
Payer: MEDICARE

## 2024-06-11 VITALS
TEMPERATURE: 98.1 F | OXYGEN SATURATION: 97 % | HEART RATE: 90 BPM | RESPIRATION RATE: 18 BRPM | HEIGHT: 66 IN | SYSTOLIC BLOOD PRESSURE: 134 MMHG | BODY MASS INDEX: 32.95 KG/M2 | WEIGHT: 205 LBS | DIASTOLIC BLOOD PRESSURE: 82 MMHG

## 2024-06-11 DIAGNOSIS — B19.20 HEPATITIS C VIRUS INFECTION WITHOUT HEPATIC COMA, UNSPECIFIED CHRONICITY: Primary | ICD-10-CM

## 2024-06-11 DIAGNOSIS — D69.6 THROMBOCYTOPENIA (HCC): Primary | ICD-10-CM

## 2024-06-11 DIAGNOSIS — D69.6 THROMBOCYTOPENIA (HCC): ICD-10-CM

## 2024-06-11 LAB
ALBUMIN SERPL BCP-MCNC: 4.7 G/DL (ref 3.5–5)
ALP SERPL-CCNC: 134 U/L (ref 34–104)
ALT SERPL W P-5'-P-CCNC: 47 U/L (ref 7–52)
ANION GAP SERPL CALCULATED.3IONS-SCNC: 9 MMOL/L (ref 4–13)
AST SERPL W P-5'-P-CCNC: 27 U/L (ref 13–39)
BASOPHILS # BLD AUTO: 0.04 THOUSANDS/ÂΜL (ref 0–0.1)
BASOPHILS NFR BLD AUTO: 1 % (ref 0–1)
BILIRUB SERPL-MCNC: 0.71 MG/DL (ref 0.2–1)
BUN SERPL-MCNC: 12 MG/DL (ref 5–25)
CALCIUM SERPL-MCNC: 9.5 MG/DL (ref 8.4–10.2)
CHLORIDE SERPL-SCNC: 101 MMOL/L (ref 96–108)
CO2 SERPL-SCNC: 28 MMOL/L (ref 21–32)
CREAT SERPL-MCNC: 0.85 MG/DL (ref 0.6–1.3)
EOSINOPHIL # BLD AUTO: 0.15 THOUSAND/ÂΜL (ref 0–0.61)
EOSINOPHIL NFR BLD AUTO: 3 % (ref 0–6)
ERYTHROCYTE [DISTWIDTH] IN BLOOD BY AUTOMATED COUNT: 13.2 % (ref 11.6–15.1)
GFR SERPL CREATININE-BSD FRML MDRD: 97 ML/MIN/1.73SQ M
GLUCOSE P FAST SERPL-MCNC: 181 MG/DL (ref 65–99)
HAV IGM SER QL: ABNORMAL
HBV CORE IGM SER QL: ABNORMAL
HBV SURFACE AG SER QL: ABNORMAL
HCT VFR BLD AUTO: 40.1 % (ref 36.5–49.3)
HCV AB SER QL: REACTIVE
HGB BLD-MCNC: 14.8 G/DL (ref 12–17)
IMM GRANULOCYTES # BLD AUTO: 0.04 THOUSAND/UL (ref 0–0.2)
IMM GRANULOCYTES NFR BLD AUTO: 1 % (ref 0–2)
LYMPHOCYTES # BLD AUTO: 1.29 THOUSANDS/ÂΜL (ref 0.6–4.47)
LYMPHOCYTES NFR BLD AUTO: 21 % (ref 14–44)
MCH RBC QN AUTO: 30.6 PG (ref 26.8–34.3)
MCHC RBC AUTO-ENTMCNC: 36.9 G/DL (ref 31.4–37.4)
MCV RBC AUTO: 83 FL (ref 82–98)
MONOCYTES # BLD AUTO: 0.47 THOUSAND/ÂΜL (ref 0.17–1.22)
MONOCYTES NFR BLD AUTO: 8 % (ref 4–12)
NEUTROPHILS # BLD AUTO: 4.11 THOUSANDS/ÂΜL (ref 1.85–7.62)
NEUTS SEG NFR BLD AUTO: 66 % (ref 43–75)
NRBC BLD AUTO-RTO: 0 /100 WBCS
PLATELET # BLD AUTO: 54 THOUSANDS/UL (ref 149–390)
PLATELET # BLD AUTO: 55 THOUSANDS/UL (ref 149–390)
PMV BLD AUTO: 13.9 FL (ref 8.9–12.7)
POTASSIUM SERPL-SCNC: 4.2 MMOL/L (ref 3.5–5.3)
PROT SERPL-MCNC: 7.5 G/DL (ref 6.4–8.4)
RBC # BLD AUTO: 4.83 MILLION/UL (ref 3.88–5.62)
SODIUM SERPL-SCNC: 138 MMOL/L (ref 135–147)
WBC # BLD AUTO: 6.1 THOUSAND/UL (ref 4.31–10.16)

## 2024-06-11 PROCEDURE — 80053 COMPREHEN METABOLIC PANEL: CPT

## 2024-06-11 PROCEDURE — 99244 OFF/OP CNSLTJ NEW/EST MOD 40: CPT | Performed by: PHYSICIAN ASSISTANT

## 2024-06-11 PROCEDURE — 36415 COLL VENOUS BLD VENIPUNCTURE: CPT

## 2024-06-11 PROCEDURE — 80074 ACUTE HEPATITIS PANEL: CPT

## 2024-06-11 PROCEDURE — 85025 COMPLETE CBC W/AUTO DIFF WBC: CPT

## 2024-06-11 NOTE — PROGRESS NOTES
Oncology Outpatient Consult Note  Raulito Hallman 58 y.o. male MRN: @ Encounter: [id number]        Date:  6/11/2024        Assessment/ Plan:    Thrombocytopenia    Patient is a pleasant 58-year-old male with significant past medical history for type 2 diabetes, hepatitis C, primary hypertension, and thrombocytopenia.    Admits bleeding from gums and easy bruising that has occurred for a few months now.  Per chart review platelet count from October 2020 until 4/25/2024 ranges from .    Etiology of thrombocytopenia is currently unknown.  Patient is aware that thrombocytopenia can be idiopathic or related to his history of liver disease.Will order stat CBC, CMP, hepatitis panel and call patient with results.      Patient knows if platelet count is below 50 we will start Decadron.  Patient is aware that Decadron is a steroid and this can cause sugars to increase.  If Decadron is initiated we recommend patient monitor sugars daily and to call if sugars become uncontrolled.    Will follow-up in 1 month with repeat blood work and ultrasound of abdomen. Patient knows to call if he begins to have spontaneous bleeding or with any questions or concerns.      Labs and imaging studies are reviewed by ordering provider once results are available. If there are findings that need immediate attention, you will be contacted when results available.   Discussing results and the implication on your healthcare is best discussed in person at your follow-up visit.       CC: Thrombocytopenia      HPI:  Raulito Hallman is seen for initial consultation 6/11/2024 for thrombocytopenia as requested by Charmaine MOREIRA.  Patient is a pleasant 58-year-old male with significant past medical history for type 2 diabetes, hyperlipidemia, hepatitis C and primary hypertension.    Admits easy bruising and bleeding from gums when he brushes his teeth.  Denies any blood in urine or stool. Denies any nose bleeds    Admits history of hepatitis C in  2003/2004.  Patient reports he had treatment for 1 year.    Admits history of liver cirrhosis. Patient reports he has quit drinking alcohol and smoking 7 years ago. Patient reports he use to smoke a pack a day and drink on his days off.    Admits history of diabetes 2 that he does monitor sugars.     Denies any chances of HIV.    Admits family history of sickle cell disease in grandmother and brother.    Denies any bleeding disorders in family.    Per chart review platelet count from October 2020 until 4/25/2024 ranges from .    Uzbek interpretor Dino was used [id number].      4/25/2024:  PSA-0.70  TSH third generation-1.263  CBC-hemoglobin 14.2, hematocrit 41.9, MCV 88, platelets 49  CMP-sodium 140, potassium 3.7, BUN 12, creatinine .76, calcium 9.2, AST 28, ALT 50, alk phos 117 total bili 0.72    9/17/21- US Of  right upper quadrant abdomen- Hepatic moderate steatosis. Cholecystectomy     Test Results:    Imaging: .No results found.    Labs:   Lab Results   Component Value Date    WBC 5.78 04/25/2024    HGB 14.2 04/25/2024    HCT 41.9 04/25/2024    MCV 88 04/25/2024    PLT 49 (L) 04/25/2024     Lab Results   Component Value Date    K 3.7 04/25/2024     04/25/2024    CO2 29 04/25/2024    BUN 12 04/25/2024    CREATININE 0.76 04/25/2024    GLUF 156 (H) 04/25/2024    CALCIUM 9.2 04/25/2024    AST 28 04/25/2024    ALT 50 04/25/2024    ALKPHOS 117 (H) 04/25/2024    EGFR 101 04/25/2024             Lab Results   Component Value Date    PSA 0.70 04/25/2024           Lab Results   Component Value Date    FERRITIN 89 09/09/2021           ROS: Review of Systems   Respiratory: Negative.     Cardiovascular: Negative.    Gastrointestinal:  Negative for blood in stool.   Genitourinary:  Negative for hematuria.    Skin:  Negative for rash.   Neurological: Negative.    Hematological:  Bruises/bleeds easily.   Psychiatric/Behavioral: Negative.              Active Problems:   Patient Active Problem List   Diagnosis     Class 1 obesity due to excess calories without serious comorbidity with body mass index (BMI) of 32.0 to 32.9 in adult    Type 2 diabetes mellitus without complication, without long-term current use of insulin (HCC)    Other hyperlipidemia    Erectile dysfunction    History of hepatitis C    Primary hypertension       Past Medical History:   Past Medical History:   Diagnosis Date    Diabetes mellitus (HCC)     Glaucoma     Hypertension        Surgical History:   Past Surgical History:   Procedure Laterality Date    CARPAL TUNNEL RELEASE      HERNIA REPAIR         Family History:  No family history on file.    Cancer-related family history is not on file.    Social History:   Social History     Socioeconomic History    Marital status: /Civil Union     Spouse name: Not on file    Number of children: Not on file    Years of education: Not on file    Highest education level: Not on file   Occupational History    Not on file   Tobacco Use    Smoking status: Never    Smokeless tobacco: Never   Vaping Use    Vaping status: Never Used   Substance and Sexual Activity    Alcohol use: Never    Drug use: Never    Sexual activity: Yes     Partners: Female   Other Topics Concern    Not on file   Social History Narrative    Not on file     Social Determinants of Health     Financial Resource Strain: Low Risk  (4/25/2024)    Overall Financial Resource Strain (CARDIA)     Difficulty of Paying Living Expenses: Not hard at all   Food Insecurity: No Food Insecurity (4/25/2024)    Hunger Vital Sign     Worried About Running Out of Food in the Last Year: Never true     Ran Out of Food in the Last Year: Never true   Transportation Needs: No Transportation Needs (4/25/2024)    PRAPARE - Transportation     Lack of Transportation (Medical): No     Lack of Transportation (Non-Medical): No   Physical Activity: Not on file   Stress: Not on file   Social Connections: Not on file   Intimate Partner Violence: Not on file   Housing  Stability: Low Risk  (4/25/2024)    Housing Stability Vital Sign     Unable to Pay for Housing in the Last Year: No     Number of Times Moved in the Last Year: 1     Homeless in the Last Year: No       Current Medications:   Current Outpatient Medications   Medication Sig Dispense Refill    amLODIPine-valsartan (EXFORGE) 5-320 MG per tablet Take 1 tablet by mouth daily 90 tablet 3    atorvastatin (LIPITOR) 40 mg tablet Take 1 tablet (40 mg total) by mouth daily 90 tablet 3    Blood Glucose Monitoring Suppl (OneTouch Verio) w/Device KIT Use daily 1 kit 0    cholecalciferol (VITAMIN D3) 1,000 units tablet Take 1 tablet (1,000 Units total) by mouth daily 30 tablet 3    glucose blood (OneTouch Verio) test strip USE TO CHECK BLOOD SUGAR EVERY MORNING 100 strip 3    Lancets (OneTouch Delica Plus [id number]) MISC USE EVERY MORNING 100 each 1    latanoprost (XALATAN) 0.005 % ophthalmic solution Administer 1 drop to both eyes daily 2.5 mL 3    metFORMIN (GLUCOPHAGE) 1000 MG tablet Take 1 tablet (1,000 mg total) by mouth 2 (two) times a day with meals 60 tablet 3     No current facility-administered medications for this visit.       Allergies: No Known Allergies      Physical Exam:    There is no height or weight on file to calculate BSA.    Wt Readings from Last 3 Encounters:   04/25/24 90.9 kg (200 lb 6.4 oz)   03/22/23 90.7 kg (200 lb)   11/03/22 92.4 kg (203 lb 9.6 oz)        Temp Readings from Last 3 Encounters:   04/25/24 98.3 °F (36.8 °C) (Temporal)   03/22/23 97.5 °F (36.4 °C) (Temporal)   11/03/22 97.6 °F (36.4 °C) (Temporal)        BP Readings from Last 3 Encounters:   05/30/24 134/82   04/25/24 156/90   03/22/23 128/76         Pulse Readings from Last 3 Encounters:   04/25/24 89   03/22/23 87   11/03/22 84     @LASTSAO2(3)@    Physical Exam  Vitals and nursing note reviewed.   Constitutional:       Appearance: Normal appearance.   HENT:      Head: Normocephalic.   Eyes:      Extraocular Movements: Extraocular  movements intact.   Cardiovascular:      Rate and Rhythm: Normal rate and regular rhythm.      Heart sounds: Normal heart sounds.   Pulmonary:      Breath sounds: Normal breath sounds. No wheezing.   Abdominal:      Palpations: Abdomen is soft.      Tenderness: There is no abdominal tenderness.   Lymphadenopathy:      Cervical:      Right cervical: No superficial, deep or posterior cervical adenopathy.     Left cervical: No superficial, deep or posterior cervical adenopathy.   Skin:     Findings: No bruising or rash.   Neurological:      General: No focal deficit present.      Mental Status: He is alert and oriented to person, place, and time.   Psychiatric:         Mood and Affect: Mood normal.         Behavior: Behavior normal.         Goals and Barriers:  Current Goal: Prolong Survival from Cancer.   Barriers: None.      Patient's Capacity to Self Care:  Patient is able to self care.

## 2024-06-11 NOTE — TELEPHONE ENCOUNTER
SPOKE WITH PATIENT WITH  [facility] [id number] REGARDING PT'S LAB RESULTS, PLT COUNT IS STABLE, PATIENT IS ADVISED TO GO FOR REPEAT CBCD IN 2 WEEKS AND THEN IN 4 WEEKS. INFORMED PATIENT AROUND 6/25 AND THEN AGAIN AROUND 7/8 OR 7/9. FOLLOW UP APPOINTMENT SCHEDULED FOR 7/10 AT 0920 WITH ROHAN IN THE [place] OFFICE. PATIENT VERBALIZED UNDERSTANDING AND THANKED ME FOR THE CALL.

## 2024-06-11 NOTE — TELEPHONE ENCOUNTER
Labs reviewed. Platelet count stable. No tx needed at this time.     Repeat CBC in 2 weeks and 4 weeks. Follow up in 4 weeks as already arranged.     Patient needs .

## 2024-06-12 ENCOUNTER — TELEPHONE (OUTPATIENT)
Dept: HEMATOLOGY ONCOLOGY | Facility: CLINIC | Age: 59
End: 2024-06-12

## 2024-06-12 NOTE — TELEPHONE ENCOUNTER
Patient was called with interpretor Chioma ([id number]).    Patient was educated on platelet count and told to call our office if he begins to have any bleeding.     Patient knows to have repeat CBC in two weeks and to follow up as scheduled. Patient was called by our office yesterday.    Hepatitis Panel was reviewed. Hepatitis C was reactive. Patient has known history of Hep C that he had received treatment for about 8 years ago. Patient was told we will refer to GI to have him evaluated further. Patient was told someone from our office will call to set up GI appointment.     All questions were answered at this time. Patient was told to call with any questions or concerns.

## 2024-06-13 ENCOUNTER — TELEPHONE (OUTPATIENT)
Dept: HEMATOLOGY ONCOLOGY | Facility: CLINIC | Age: 59
End: 2024-06-13

## 2024-06-13 ENCOUNTER — HOSPITAL ENCOUNTER (OUTPATIENT)
Dept: ULTRASOUND IMAGING | Facility: HOSPITAL | Age: 59
End: 2024-06-13
Payer: MEDICARE

## 2024-06-13 DIAGNOSIS — D69.6 THROMBOCYTOPENIA (HCC): ICD-10-CM

## 2024-06-13 PROCEDURE — 76700 US EXAM ABDOM COMPLETE: CPT

## 2024-06-13 NOTE — TELEPHONE ENCOUNTER
Called patient using , spoke with patient's wife. Reviewed location, date and time of the Gastro appt. She acknowledged.

## 2024-06-25 ENCOUNTER — APPOINTMENT (OUTPATIENT)
Dept: LAB | Facility: HOSPITAL | Age: 59
End: 2024-06-25
Payer: MEDICARE

## 2024-06-25 DIAGNOSIS — D69.6 THROMBOCYTOPENIA (HCC): ICD-10-CM

## 2024-06-25 LAB
BASOPHILS # BLD AUTO: 0.05 THOUSANDS/ÂΜL (ref 0–0.1)
BASOPHILS NFR BLD AUTO: 1 % (ref 0–1)
EOSINOPHIL # BLD AUTO: 0.18 THOUSAND/ÂΜL (ref 0–0.61)
EOSINOPHIL NFR BLD AUTO: 3 % (ref 0–6)
ERYTHROCYTE [DISTWIDTH] IN BLOOD BY AUTOMATED COUNT: 13.2 % (ref 11.6–15.1)
HCT VFR BLD AUTO: 40.2 % (ref 36.5–49.3)
HGB BLD-MCNC: 14.6 G/DL (ref 12–17)
IMM GRANULOCYTES # BLD AUTO: 0.03 THOUSAND/UL (ref 0–0.2)
IMM GRANULOCYTES NFR BLD AUTO: 1 % (ref 0–2)
LYMPHOCYTES # BLD AUTO: 1.68 THOUSANDS/ÂΜL (ref 0.6–4.47)
LYMPHOCYTES NFR BLD AUTO: 25 % (ref 14–44)
MCH RBC QN AUTO: 30.5 PG (ref 26.8–34.3)
MCHC RBC AUTO-ENTMCNC: 36.3 G/DL (ref 31.4–37.4)
MCV RBC AUTO: 84 FL (ref 82–98)
MONOCYTES # BLD AUTO: 0.53 THOUSAND/ÂΜL (ref 0.17–1.22)
MONOCYTES NFR BLD AUTO: 8 % (ref 4–12)
NEUTROPHILS # BLD AUTO: 4.14 THOUSANDS/ÂΜL (ref 1.85–7.62)
NEUTS SEG NFR BLD AUTO: 62 % (ref 43–75)
NRBC BLD AUTO-RTO: 0 /100 WBCS
PLATELET # BLD AUTO: 49 THOUSANDS/UL (ref 149–390)
PLATELET BLD QL SMEAR: ABNORMAL
RBC # BLD AUTO: 4.78 MILLION/UL (ref 3.88–5.62)
RBC MORPH BLD: NORMAL
WBC # BLD AUTO: 6.61 THOUSAND/UL (ref 4.31–10.16)

## 2024-06-25 PROCEDURE — 85025 COMPLETE CBC W/AUTO DIFF WBC: CPT

## 2024-06-25 PROCEDURE — 36415 COLL VENOUS BLD VENIPUNCTURE: CPT

## 2024-06-27 ENCOUNTER — OFFICE VISIT (OUTPATIENT)
Dept: GASTROENTEROLOGY | Facility: CLINIC | Age: 59
End: 2024-06-27
Payer: MEDICARE

## 2024-06-27 VITALS
HEART RATE: 83 BPM | DIASTOLIC BLOOD PRESSURE: 87 MMHG | BODY MASS INDEX: 33.27 KG/M2 | OXYGEN SATURATION: 97 % | WEIGHT: 207 LBS | TEMPERATURE: 99.5 F | SYSTOLIC BLOOD PRESSURE: 145 MMHG | HEIGHT: 66 IN

## 2024-06-27 DIAGNOSIS — R76.8 HEPATITIS C ANTIBODY TEST POSITIVE: Primary | ICD-10-CM

## 2024-06-27 DIAGNOSIS — Z12.11 COLON CANCER SCREENING: ICD-10-CM

## 2024-06-27 DIAGNOSIS — B19.20 HEPATITIS C VIRUS INFECTION WITHOUT HEPATIC COMA, UNSPECIFIED CHRONICITY: ICD-10-CM

## 2024-06-27 DIAGNOSIS — K76.0 FATTY LIVER: ICD-10-CM

## 2024-06-27 DIAGNOSIS — D69.6 THROMBOCYTOPENIA (HCC): ICD-10-CM

## 2024-06-27 PROCEDURE — 99244 OFF/OP CNSLTJ NEW/EST MOD 40: CPT | Performed by: PHYSICIAN ASSISTANT

## 2024-06-27 NOTE — PROGRESS NOTES
[facility] - Outpatient Consultation  Raulito Hallman 58 y.o. male MRN: [id number]  Encounter: [id number]      Assessment and Plan    1.  Thrombocytopenia  2.  Hepatitis C positive antibody  3.  Fatty liver  The patient was recently seen by hematology oncology secondary to thrombocytopenia.  For evaluation they ordered a CMP, acute hepatitis panel, and ultrasound. CMP revealed an elevated alkaline phosphatase of 134 (other liver enzymes normal), acute hepatitis panel revealed a positive hepatitis C antibody, and ultrasound revealed splenomegaly with fatty liver.  The patient states that he does have a history of hepatitis C that was treated in about 2015.  -The patient's hepatitis C antibody is likely positive given his history of hepatitis C which was treated, I recommend obtaining a hepatitis C PCR to ensure there is no active virus requiring further treatment  -Ultrasound elastography to determine if any concern for steatosis or fibrosis    4.  Colorectal cancer screening  Last colonoscopy was general surgery 3/17/2021 with 3 hyperplastic polyps removed and recall recommended 10 years later.  -Repeat colonoscopy 3/2031 or sooner if clinically indicated    Call the patient with the results of his blood work and ultrasound and further treatment planning    ______________________________________________________________________    History of Present Illness  Raulito Hallman is a 58 y.o. male here for consultation of hepatitis C positive antibody.  The patient was recently seen by hematology oncology secondary to thrombocytopenia.  For further evaluation they ordered a CMP, acute hepatitis panel and ultrasound.  CMP revealed an elevated alkaline phosphatase of 134, acute hepatitis panel revealed a positive hepatitis C antibody, and ultrasound revealed splenomegaly with fatty liver.  The patient states that he does have a history of hepatitis C that was treated in about 2015.      Review  of Systems   Constitutional:  Negative for activity change, appetite change, chills, fatigue, fever and unexpected weight change.   Gastrointestinal:  Negative for abdominal distention, abdominal pain, anal bleeding, blood in stool, constipation, diarrhea, nausea, rectal pain and vomiting.   Musculoskeletal:  Negative for back pain and gait problem.   Psychiatric/Behavioral:  Negative for confusion.        Past Medical History  Past Medical History:   Diagnosis Date    Diabetes mellitus (HCC)     Glaucoma     Hypertension        Past Social history  Past Surgical History:   Procedure Laterality Date    CARPAL TUNNEL RELEASE      HERNIA REPAIR       Social History     Socioeconomic History    Marital status: /Civil Union     Spouse name: Not on file    Number of children: Not on file    Years of education: Not on file    Highest education level: Not on file   Occupational History    Not on file   Tobacco Use    Smoking status: Never    Smokeless tobacco: Never   Vaping Use    Vaping status: Never Used   Substance and Sexual Activity    Alcohol use: Never    Drug use: Never    Sexual activity: Yes     Partners: Female   Other Topics Concern    Not on file   Social History Narrative    Not on file     Social Determinants of Health     Financial Resource Strain: Low Risk  (4/25/2024)    Overall Financial Resource Strain (CARDIA)     Difficulty of Paying Living Expenses: Not hard at all   Food Insecurity: No Food Insecurity (4/25/2024)    Hunger Vital Sign     Worried About Running Out of Food in the Last Year: Never true     Ran Out of Food in the Last Year: Never true   Transportation Needs: No Transportation Needs (4/25/2024)    PRAPARE - Transportation     Lack of Transportation (Medical): No     Lack of Transportation (Non-Medical): No   Physical Activity: Not on file   Stress: Not on file   Social Connections: Not on file   Intimate Partner Violence: Not on file   Housing Stability: Low Risk  (4/25/2024)     "Housing Stability Vital Sign     Unable to Pay for Housing in the Last Year: No     Number of Times Moved in the Last Year: 1     Homeless in the Last Year: No     Social History     Substance and Sexual Activity   Alcohol Use Never     Social History     Substance and Sexual Activity   Drug Use Never     Social History     Tobacco Use   Smoking Status Never   Smokeless Tobacco Never       Past Family History  Family History   Problem Relation Age of Onset    Diabetes Father        Current Medications  Current Outpatient Medications   Medication Sig Dispense Refill    amLODIPine-valsartan (EXFORGE) 5-320 MG per tablet Take 1 tablet by mouth daily 90 tablet 3    atorvastatin (LIPITOR) 40 mg tablet Take 1 tablet (40 mg total) by mouth daily 90 tablet 3    Blood Glucose Monitoring Suppl (OneTouch Verio) w/Device KIT Use daily 1 kit 0    cholecalciferol (VITAMIN D3) 1,000 units tablet Take 1 tablet (1,000 Units total) by mouth daily 30 tablet 3    glucose blood (OneTouch Verio) test strip USE TO CHECK BLOOD SUGAR EVERY MORNING 100 strip 3    Lancets (OneTouch Delica Plus [id number]) MISC USE EVERY MORNING 100 each 1    latanoprost (XALATAN) 0.005 % ophthalmic solution Administer 1 drop to both eyes daily 2.5 mL 3    metFORMIN (GLUCOPHAGE) 1000 MG tablet Take 1 tablet (1,000 mg total) by mouth 2 (two) times a day with meals 60 tablet 3     No current facility-administered medications for this visit.       Allergies  No Known Allergies      The following portions of the patient's history were reviewed and updated as appropriate: allergies, current medications, past medical history, past social history, past surgical history and problem list.      Vitals  Vitals:    06/27/24 0842   BP: 145/87   BP Location: Left arm   Patient Position: Sitting   Cuff Size: Standard   Pulse: 83   Temp: 99.5 °F (37.5 °C)   TempSrc: Tympanic   SpO2: 97%   Weight: 93.9 kg (207 lb)   Height: 5' 6\" (1.676 m)         Physical Exam  Constitutional "   General appearance: Patient is seated and in no acute distress, well appearing and well nourished.   Head and Face   Head and face: Normal.    Eyes   Conjunctiva and lids: No erythema, swelling or discharge.  Anicteric.  Ears, Nose, Mouth, and Throat   Hearing: Normal.    Neck: Supple, trachea midline.  Pulmonary   Respiratory effort: No increased work of breathing or signs of respiratory distress.    Cardiovascular   Examination of extremities for edema and/or varicosities: Normal.    Musculoskeletal   Gait and station: Normal    Skin   Skin and subcutaneous tissue: Warm, dry, and intact. No visible jaundice, lesions or rashes.  Psychiatric   Judgment and insight: Normal  Recent and remote memory:  Normal  Mood and affect: Normal      Results  No visits with results within 1 Day(s) from this visit.   Latest known visit with results is:   Appointment on 06/25/2024   Component Date Value    WBC 06/25/2024 6.61     RBC 06/25/2024 4.78     Hemoglobin 06/25/2024 14.6     Hematocrit 06/25/2024 40.2     MCV 06/25/2024 84     MCH 06/25/2024 30.5     MCHC 06/25/2024 36.3     RDW 06/25/2024 13.2     Platelets 06/25/2024 49 (L)     nRBC 06/25/2024 0     Segmented % 06/25/2024 62     Immature Grans % 06/25/2024 1     Lymphocytes % 06/25/2024 25     Monocytes % 06/25/2024 8     Eosinophils Relative 06/25/2024 3     Basophils Relative 06/25/2024 1     Absolute Neutrophils 06/25/2024 4.14     Absolute Immature Grans 06/25/2024 0.03     Absolute Lymphocytes 06/25/2024 1.68     Absolute Monocytes 06/25/2024 0.53     Eosinophils Absolute 06/25/2024 0.18     Basophils Absolute 06/25/2024 0.05     RBC Morphology 06/25/2024 Normal     Platelet Estimate 06/25/2024 Decreased (A)        Radiology Results  US abdomen complete    Result Date: 6/19/2024  Narrative: ABDOMEN ULTRASOUND, COMPLETE INDICATION: D69.6: Thrombocytopenia, unspecified. COMPARISON: Right upper quadrant ultrasound 9/17/2021 TECHNIQUE: Real-time ultrasound of the  abdomen was performed with a curvilinear transducer with both volumetric sweeps and still imaging techniques. FINDINGS: PANCREAS: Evaluation limited by bowel gas. Visualized portions of the pancreas are within normal limits. AORTA AND IVC: Visualized portions are normal for patient age. LIVER: Size: Within normal range. The liver measures 12.9 cm in the midclavicular line. Contour: Surface contour is smooth. Parenchyma: There is moderate diffuse increased echogenicity with smooth echotexture and acoustic beam attenuation. Findings are most consistent with moderate hepatic steatosis. No liver mass identified. Limited imaging of the main portal vein shows it to be patent and hepatopetal. BILIARY: Patient has undergone cholecystectomy. No intrahepatic biliary dilatation. CBD measures 6.0 mm. No choledocholithiasis. KIDNEY: Right kidney measures 12.0 x 7.0 x 4.4 cm. Volume 193.3 mL Kidney within normal limits. Left kidney measures 12.2 x 5.3 x 5.1 cm. Volume 172.7 mL Kidney within normal limits. SPLEEN: Measures 15.3 x 16.4 x 5.4 cm. Volume 715.1 mL Mildly enlarged. No focal lesions detected. ASCITES: None.     Impression: 1. Mild splenomegaly. 2. Fatty liver. Workstation performed: [id number]       Orders  No orders of the defined types were placed in this encounter.

## 2024-06-29 ENCOUNTER — HOSPITAL ENCOUNTER (OUTPATIENT)
Dept: ULTRASOUND IMAGING | Facility: HOSPITAL | Age: 59
Discharge: HOME | End: 2024-06-29
Payer: MEDICARE

## 2024-06-29 DIAGNOSIS — D69.6 THROMBOCYTOPENIA (HCC): ICD-10-CM

## 2024-06-29 DIAGNOSIS — R76.8 HEPATITIS C ANTIBODY TEST POSITIVE: ICD-10-CM

## 2024-06-29 PROCEDURE — 76981 USE PARENCHYMA: CPT

## 2024-07-08 ENCOUNTER — APPOINTMENT (OUTPATIENT)
Dept: LAB | Facility: HOSPITAL | Age: 59
End: 2024-07-08
Payer: MEDICARE

## 2024-07-08 DIAGNOSIS — R76.8 HEPATITIS C ANTIBODY TEST POSITIVE: ICD-10-CM

## 2024-07-08 DIAGNOSIS — D69.6 THROMBOCYTOPENIA (HCC): ICD-10-CM

## 2024-07-08 LAB
BASOPHILS # BLD AUTO: 0.05 THOUSANDS/ÂΜL (ref 0–0.1)
BASOPHILS NFR BLD AUTO: 1 % (ref 0–1)
EOSINOPHIL # BLD AUTO: 0.15 THOUSAND/ÂΜL (ref 0–0.61)
EOSINOPHIL NFR BLD AUTO: 2 % (ref 0–6)
ERYTHROCYTE [DISTWIDTH] IN BLOOD BY AUTOMATED COUNT: 13.2 % (ref 11.6–15.1)
HCT VFR BLD AUTO: 40.9 % (ref 36.5–49.3)
HGB BLD-MCNC: 14.2 G/DL (ref 12–17)
IMM GRANULOCYTES # BLD AUTO: 0.05 THOUSAND/UL (ref 0–0.2)
IMM GRANULOCYTES NFR BLD AUTO: 1 % (ref 0–2)
LYMPHOCYTES # BLD AUTO: 1.43 THOUSANDS/ÂΜL (ref 0.6–4.47)
LYMPHOCYTES NFR BLD AUTO: 23 % (ref 14–44)
MCH RBC QN AUTO: 30.2 PG (ref 26.8–34.3)
MCHC RBC AUTO-ENTMCNC: 34.7 G/DL (ref 31.4–37.4)
MCV RBC AUTO: 87 FL (ref 82–98)
MONOCYTES # BLD AUTO: 0.54 THOUSAND/ÂΜL (ref 0.17–1.22)
MONOCYTES NFR BLD AUTO: 9 % (ref 4–12)
NEUTROPHILS # BLD AUTO: 3.95 THOUSANDS/ÂΜL (ref 1.85–7.62)
NEUTS SEG NFR BLD AUTO: 64 % (ref 43–75)
NRBC BLD AUTO-RTO: 0 /100 WBCS
PLATELET # BLD AUTO: 51 THOUSANDS/UL (ref 149–390)
RBC # BLD AUTO: 4.7 MILLION/UL (ref 3.88–5.62)
WBC # BLD AUTO: 6.17 THOUSAND/UL (ref 4.31–10.16)

## 2024-07-08 PROCEDURE — 87521 HEPATITIS C PROBE&RVRS TRNSC: CPT

## 2024-07-08 PROCEDURE — 36415 COLL VENOUS BLD VENIPUNCTURE: CPT

## 2024-07-08 PROCEDURE — 85025 COMPLETE CBC W/AUTO DIFF WBC: CPT

## 2024-07-08 PROCEDURE — 87522 HEPATITIS C REVRS TRNSCRPJ: CPT

## 2024-07-09 DIAGNOSIS — D69.6 THROMBOCYTOPENIA (HCC): ICD-10-CM

## 2024-07-09 DIAGNOSIS — B19.20 HEPATITIS C VIRUS INFECTION WITHOUT HEPATIC COMA, UNSPECIFIED CHRONICITY: Primary | ICD-10-CM

## 2024-07-10 ENCOUNTER — TELEPHONE (OUTPATIENT)
Age: 59
End: 2024-07-10

## 2024-07-10 ENCOUNTER — OFFICE VISIT (OUTPATIENT)
Dept: HEMATOLOGY ONCOLOGY | Facility: CLINIC | Age: 59
End: 2024-07-10
Payer: MEDICARE

## 2024-07-10 VITALS
DIASTOLIC BLOOD PRESSURE: 60 MMHG | SYSTOLIC BLOOD PRESSURE: 124 MMHG | RESPIRATION RATE: 16 BRPM | OXYGEN SATURATION: 96 % | BODY MASS INDEX: 32.95 KG/M2 | HEART RATE: 82 BPM | WEIGHT: 205 LBS | TEMPERATURE: 97.6 F | HEIGHT: 66 IN

## 2024-07-10 DIAGNOSIS — D69.6 THROMBOCYTOPENIA (HCC): Primary | ICD-10-CM

## 2024-07-10 LAB — HCV RNA SERPL NAA+PROBE-ACNC: NOT DETECTED K[IU]/ML

## 2024-07-10 PROCEDURE — 99214 OFFICE O/P EST MOD 30 MIN: CPT | Performed by: PHYSICIAN ASSISTANT

## 2024-07-10 RX ORDER — DEXAMETHASONE 4 MG/1
20 TABLET ORAL
Qty: 15 TABLET | Refills: 0 | Status: SHIPPED | OUTPATIENT
Start: 2024-07-10 | End: 2024-07-13

## 2024-07-10 NOTE — PROGRESS NOTES
Hematology/Oncology Outpatient Follow-up  Raulito Hallman 59 y.o. male 1965 [id number]    Date:  7/10/2024      Assessment and Plan:  1. Thrombocytopenia (HCC)  Thrombocytopenia with splenomegaly, mild  This was thought to be possibly related to his previous hepatitis and possible liver disease however his does not appear to have any significant liver dysfunction at this time as there is no cirrhosis    It is possible he may have ITP.  I would like to give him a trial of steroids to see if he responds which would then favor this being ITP.  With a platelet count around 50,000 he does not require daily treatment but this would give an indication if this is the diagnosis.    He was instructed he will take the below prescription by mouth daily for 3 days in the morning with breakfast.  He will repeat labs 4 days after.  Follow-up in 3 months, possibly sooner depending on results of labs    - dexamethasone (DECADRON) 4 mg tablet; Take 5 tablets (20 mg total) by mouth daily with breakfast for 3 days  Dispense: 15 tablet; Refill: 0  - CBC and differential; Future  - Specimen draw for platelet clumping; Future      Pixable used today.    HPI:  59-year-old male presents for follow-up visit.    Patient was sent for thrombocytopenia.  Patient history of hepatitis C in 2003/4 for which he received treatment for 1 year and then had not followed up with GI.  He has seen GI recently.  There is no mention of cirrhosis on the liver specific ultrasound they performed.  He has other testing pending in process for status of his hepatitis C if there is any current activity.    ROS: Review of Systems   Constitutional:  Negative for activity change, appetite change, chills, fatigue, fever and unexpected weight change.   HENT:  Negative for mouth sores and nosebleeds.    Respiratory:  Negative for cough and shortness of breath.    Cardiovascular:  Negative for chest pain, palpitations and leg swelling.   Gastrointestinal:   Negative for abdominal pain, constipation, diarrhea, nausea and vomiting.   Genitourinary:  Negative for difficulty urinating, dysuria and hematuria.   Musculoskeletal:  Negative for arthralgias.   Skin: Negative.    Neurological:  Negative for dizziness, weakness, light-headedness, numbness and headaches.   Hematological: Negative.    Psychiatric/Behavioral: Negative.       Past Medical History:   Diagnosis Date    Diabetes mellitus (HCC)     Glaucoma     Hypertension        Past Surgical History:   Procedure Laterality Date    CARPAL TUNNEL RELEASE      HERNIA REPAIR         Social History     Socioeconomic History    Marital status: /Civil Union     Spouse name: None    Number of children: None    Years of education: None    Highest education level: None   Occupational History    None   Tobacco Use    Smoking status: Never     Passive exposure: Never    Smokeless tobacco: Never   Vaping Use    Vaping status: Never Used   Substance and Sexual Activity    Alcohol use: Never    Drug use: Never    Sexual activity: Yes     Partners: Female   Other Topics Concern    None   Social History Narrative    None     Social Determinants of Health     Financial Resource Strain: Low Risk  (4/25/2024)    Overall Financial Resource Strain (CARDIA)     Difficulty of Paying Living Expenses: Not hard at all   Food Insecurity: No Food Insecurity (4/25/2024)    Hunger Vital Sign     Worried About Running Out of Food in the Last Year: Never true     Ran Out of Food in the Last Year: Never true   Transportation Needs: No Transportation Needs (4/25/2024)    PRAPARE - Transportation     Lack of Transportation (Medical): No     Lack of Transportation (Non-Medical): No   Physical Activity: Not on file   Stress: Not on file   Social Connections: Not on file   Intimate Partner Violence: Not on file   Housing Stability: Low Risk  (4/25/2024)    Housing Stability Vital Sign     Unable to Pay for Housing in the Last Year: No     Number of  "Times Moved in the Last Year: 1     Homeless in the Last Year: No       Family History   Problem Relation Age of Onset    Diabetes Father      No Known Allergies      Current Outpatient Medications:     amLODIPine-valsartan (EXFORGE) 5-320 MG per tablet, Take 1 tablet by mouth daily, Disp: 90 tablet, Rfl: 3    atorvastatin (LIPITOR) 40 mg tablet, Take 1 tablet (40 mg total) by mouth daily, Disp: 90 tablet, Rfl: 3    Blood Glucose Monitoring Suppl (OneTouch Verio) w/Device KIT, Use daily, Disp: 1 kit, Rfl: 0    cholecalciferol (VITAMIN D3) 1,000 units tablet, Take 1 tablet (1,000 Units total) by mouth daily, Disp: 30 tablet, Rfl: 3    dexamethasone (DECADRON) 4 mg tablet, Take 5 tablets (20 mg total) by mouth daily with breakfast for 3 days, Disp: 15 tablet, Rfl: 0    glucose blood (OneTouch Verio) test strip, USE TO CHECK BLOOD SUGAR EVERY MORNING, Disp: 100 strip, Rfl: 3    Lancets (OneTouch Delica Plus [id number]) MISC, USE EVERY MORNING, Disp: 100 each, Rfl: 1    latanoprost (XALATAN) 0.005 % ophthalmic solution, Administer 1 drop to both eyes daily, Disp: 2.5 mL, Rfl: 3    metFORMIN (GLUCOPHAGE) 1000 MG tablet, Take 1 tablet (1,000 mg total) by mouth 2 (two) times a day with meals, Disp: 60 tablet, Rfl: 3    Physical Exam:  /60 (BP Location: Left arm, Patient Position: Sitting, Cuff Size: Adult)   Pulse 82   Temp 97.6 °F (36.4 °C) (Temporal)   Resp 16   Ht 5' 6\" (1.676 m)   Wt 93 kg (205 lb)   SpO2 96%   BMI 33.09 kg/m²     Physical Exam  Vitals reviewed.   Constitutional:       General: He is not in acute distress.     Appearance: He is well-developed. He is not ill-appearing.   HENT:      Head: Normocephalic and atraumatic.   Eyes:      General: No scleral icterus.     Conjunctiva/sclera: Conjunctivae normal.   Cardiovascular:      Rate and Rhythm: Normal rate and regular rhythm.      Heart sounds: Normal heart sounds. No murmur heard.  Pulmonary:      Effort: Pulmonary effort is normal. No " respiratory distress.      Breath sounds: Normal breath sounds.   Abdominal:      Palpations: Abdomen is soft.      Tenderness: There is no abdominal tenderness.   Musculoskeletal:         General: No tenderness. Normal range of motion.      Cervical back: Normal range of motion and neck supple.      Right lower leg: No edema.      Left lower leg: No edema.   Lymphadenopathy:      Cervical: No cervical adenopathy.   Skin:     General: Skin is warm and dry.   Neurological:      Mental Status: He is alert and oriented to person, place, and time.      Cranial Nerves: No cranial nerve deficit.   Psychiatric:         Mood and Affect: Mood normal.         Behavior: Behavior normal.       Labs:          I have spent 30 minutes with Patient  today   Patient voiced understanding and agreement in the above discussion. Aware to contact our office with questions/symptoms in the interim.     This note has been generated by voice recognition software system.  Therefore, there may be spelling, grammar, and or syntax errors. Please contact if questions arise.

## 2024-07-10 NOTE — TELEPHONE ENCOUNTER
ID#[id number] used for phone call.     Patient called in with questions regarding how he should be taking his decadron dose. Clarified with patient that he is to take this medication daily for 3 days in the morning with breakfast. He is then to repeat labs 4 days after that. Informed patient that lab orders are placed in his chart as well. Pt verbalized understanding.

## 2024-07-18 ENCOUNTER — TELEPHONE (OUTPATIENT)
Age: 59
End: 2024-07-18

## 2024-07-18 ENCOUNTER — APPOINTMENT (OUTPATIENT)
Dept: LAB | Facility: HOSPITAL | Age: 59
End: 2024-07-18
Payer: MEDICARE

## 2024-07-18 DIAGNOSIS — D69.6 THROMBOCYTOPENIA (HCC): ICD-10-CM

## 2024-07-18 DIAGNOSIS — B19.20 HEPATITIS C VIRUS INFECTION WITHOUT HEPATIC COMA, UNSPECIFIED CHRONICITY: ICD-10-CM

## 2024-07-18 LAB
BASOPHILS # BLD AUTO: 0.03 THOUSANDS/ÂΜL (ref 0–0.1)
BASOPHILS NFR BLD AUTO: 0 % (ref 0–1)
EOSINOPHIL # BLD AUTO: 0.17 THOUSAND/ÂΜL (ref 0–0.61)
EOSINOPHIL NFR BLD AUTO: 2 % (ref 0–6)
ERYTHROCYTE [DISTWIDTH] IN BLOOD BY AUTOMATED COUNT: 13.1 % (ref 11.6–15.1)
HCT VFR BLD AUTO: 40.2 % (ref 36.5–49.3)
HGB BLD-MCNC: 14.7 G/DL (ref 12–17)
IMM GRANULOCYTES # BLD AUTO: 0.12 THOUSAND/UL (ref 0–0.2)
IMM GRANULOCYTES NFR BLD AUTO: 1 % (ref 0–2)
LYMPHOCYTES # BLD AUTO: 1.61 THOUSANDS/ÂΜL (ref 0.6–4.47)
LYMPHOCYTES NFR BLD AUTO: 19 % (ref 14–44)
MCH RBC QN AUTO: 30.2 PG (ref 26.8–34.3)
MCHC RBC AUTO-ENTMCNC: 36.6 G/DL (ref 31.4–37.4)
MCV RBC AUTO: 83 FL (ref 82–98)
MONOCYTES # BLD AUTO: 0.7 THOUSAND/ÂΜL (ref 0.17–1.22)
MONOCYTES NFR BLD AUTO: 8 % (ref 4–12)
NEUTROPHILS # BLD AUTO: 6.05 THOUSANDS/ÂΜL (ref 1.85–7.62)
NEUTS SEG NFR BLD AUTO: 70 % (ref 43–75)
NRBC BLD AUTO-RTO: 0 /100 WBCS
PLATELET # BLD AUTO: 40 THOUSANDS/UL (ref 149–390)
PLATELET # BLD AUTO: 47 THOUSANDS/UL (ref 149–390)
PMV BLD AUTO: 14.2 FL (ref 8.9–12.7)
RBC # BLD AUTO: 4.87 MILLION/UL (ref 3.88–5.62)
WBC # BLD AUTO: 8.68 THOUSAND/UL (ref 4.31–10.16)

## 2024-07-18 PROCEDURE — 36415 COLL VENOUS BLD VENIPUNCTURE: CPT

## 2024-07-18 PROCEDURE — 85025 COMPLETE CBC W/AUTO DIFF WBC: CPT

## 2024-07-18 NOTE — TELEPHONE ENCOUNTER
Received critical result from patients labs completed today 7/18, warm transfer attempted.    Platelet count: 47,000/uL

## 2024-07-23 ENCOUNTER — TELEPHONE (OUTPATIENT)
Dept: GASTROENTEROLOGY | Facility: MEDICAL CENTER | Age: 59
End: 2024-07-23

## 2024-07-23 LAB
A2 MACROGLOB SERPL-MCNC: 308 MG/DL (ref 110–276)
ALT SERPL W P-5'-P-CCNC: 79 IU/L (ref 0–55)
APO A-I SERPL-MCNC: 148 MG/DL (ref 101–178)
BILIRUB SERPL-MCNC: 0.2 MG/DL (ref 0–1.2)
COMMENT: ABNORMAL
FIBROSIS SCORING:: ABNORMAL
FIBROSIS STAGE SERPL QL: ABNORMAL
GGT SERPL-CCNC: 60 IU/L (ref 0–65)
HAPTOGLOB SERPL-MCNC: 187 MG/DL (ref 29–370)
INTERPRETATIONS: ABNORMAL
LIVER FIBR SCORE SERPL CALC.FIBROSURE: 0.3 (ref 0–0.21)
NECROINFLAMM ACTIVITY SCORING:: ABNORMAL
NECROINFLAMMATORY ACT GRADE SERPL QL: ABNORMAL
NECROINFLAMMATORY ACT SCORE SERPL: 0.47 (ref 0–0.17)
REF LAB TEST METHOD: ABNORMAL
SERVICE CMNT-IMP: ABNORMAL

## 2024-07-23 NOTE — TELEPHONE ENCOUNTER
Attempted to call pt for the 4th time and was unable to get him via phone call sending contact letter for the PT all 3 results are from different task and was attempting to call about all three if PT call back     Brennan SALAZAR    Result-  Patient is Ukrainian-speaking, please call to let him know that his ultrasound elastography was nondiagnostic for scarring in his liver secondary to body habitus however he does have inflammation in his liver.  For further evaluation we could do a blood test to check for fibrosis.     Result-Please let the patient know his hep C was negative meaning his prior treatment was successful and no further treatment is required     Result- Please let the patient know that there is no concern for cirrhosis/fiborsis/scarring based off of his testing, good news

## 2024-07-24 NOTE — TELEPHONE ENCOUNTER
Called patient utilizing the .     No answer, voicemail box not set up.    Please arrange follow up in 1-2 weeks in [place] with CBC-D prior to visit. I am thinking just make the appt change and he hopefully will see that in [facility].

## 2024-08-13 ENCOUNTER — OFFICE VISIT (OUTPATIENT)
Dept: HEMATOLOGY ONCOLOGY | Facility: CLINIC | Age: 59
End: 2024-08-13
Payer: MEDICARE

## 2024-08-13 VITALS
TEMPERATURE: 97.7 F | DIASTOLIC BLOOD PRESSURE: 82 MMHG | RESPIRATION RATE: 16 BRPM | WEIGHT: 206 LBS | OXYGEN SATURATION: 95 % | BODY MASS INDEX: 33.11 KG/M2 | HEART RATE: 90 BPM | HEIGHT: 66 IN | SYSTOLIC BLOOD PRESSURE: 130 MMHG

## 2024-08-13 DIAGNOSIS — R16.1 SPLENOMEGALY: ICD-10-CM

## 2024-08-13 DIAGNOSIS — D69.6 THROMBOCYTOPENIA (HCC): Primary | ICD-10-CM

## 2024-08-13 PROCEDURE — 99214 OFFICE O/P EST MOD 30 MIN: CPT | Performed by: PHYSICIAN ASSISTANT

## 2024-08-13 NOTE — PROGRESS NOTES
Hematology/Oncology Outpatient Follow-up  Raulito Hallman 59 y.o. male 1965 [id number]    Date:  8/13/2024      Assessment and Plan:  1. Thrombocytopenia (HCC) 2. Splenomegaly  59-year-old male presents for follow-up regarding thrombocytopenia.  Platelet count remains low, a pulsed dose of Decadron was trialed to see if diagnosis favored ITP.  He did not have any response with this.  Possibly a longer steroid taper would be needed however with his splenomegaly and thrombocytopenia that is persistent and stable, concern for lymphoproliferative disorder.  In someone with ITP we would expect continued drop in platelet count and his has been stable in the 40-50,000 range.  Reviewed at this point a bone marrow biopsy is recommended for further investigation/more definitive diagnosis.  We reviewed this procedure at length.  He will follow-up after procedure is completed to review.    Reviewed with patient that I did try to contact him with  services as he does not speak English at all to review this however was unable to get through.  He states that in the future we could also try contacting his wife however it is difficult for him to communicate over the phone.     [employer] services were used during the entirety of the encounter today.    [employer] form completed and gave to office staff.    - Ambulatory Referral to Interventional Radiology; Future    HPI:  59-year-old male presents for follow-up visit.     Patient was sent for thrombocytopenia.  Patient history of hepatitis C in 2003/4 for which he received treatment for 1 year and then had not followed up with GI.  He has seen GI recently.  There is no mention of cirrhosis on the liver specific ultrasound they performed; no hepatomegaly.     Interval history:    ROS: Review of Systems   Constitutional:  Negative for appetite change, chills, fatigue, fever and unexpected weight change.   HENT:  Negative for mouth sores and nosebleeds.     Respiratory:  Negative for cough and shortness of breath.    Cardiovascular:  Negative for chest pain, palpitations and leg swelling.   Gastrointestinal:  Negative for abdominal pain, blood in stool, constipation, diarrhea, nausea and vomiting.   Genitourinary:  Negative for difficulty urinating, dysuria and hematuria.   Musculoskeletal:  Negative for arthralgias.   Skin: Negative.    Neurological:  Negative for dizziness, weakness, light-headedness, numbness and headaches.   Hematological: Negative.    Psychiatric/Behavioral: Negative.         Past Medical History:   Diagnosis Date    Diabetes mellitus (HCC)     Glaucoma     Hypertension        Past Surgical History:   Procedure Laterality Date    CARPAL TUNNEL RELEASE      HERNIA REPAIR         Social History     Socioeconomic History    Marital status: /Civil Union     Spouse name: None    Number of children: None    Years of education: None    Highest education level: None   Occupational History    None   Tobacco Use    Smoking status: Never     Passive exposure: Never    Smokeless tobacco: Never   Vaping Use    Vaping status: Never Used   Substance and Sexual Activity    Alcohol use: Never    Drug use: Never    Sexual activity: Yes     Partners: Female   Other Topics Concern    None   Social History Narrative    None     Social Determinants of Health     Financial Resource Strain: Low Risk  (4/25/2024)    Overall Financial Resource Strain (CARDIA)     Difficulty of Paying Living Expenses: Not hard at all   Food Insecurity: No Food Insecurity (4/25/2024)    Hunger Vital Sign     Worried About Running Out of Food in the Last Year: Never true     Ran Out of Food in the Last Year: Never true   Transportation Needs: No Transportation Needs (4/25/2024)    PRAPARE - Transportation     Lack of Transportation (Medical): No     Lack of Transportation (Non-Medical): No   Physical Activity: Not on file   Stress: Not on file   Social Connections: Not on file  "  Intimate Partner Violence: Not on file   Housing Stability: Low Risk  (4/25/2024)    Housing Stability Vital Sign     Unable to Pay for Housing in the Last Year: No     Number of Times Moved in the Last Year: 1     Homeless in the Last Year: No       Family History   Problem Relation Age of Onset    Diabetes Father        No Known Allergies      Current Outpatient Medications:     amLODIPine-valsartan (EXFORGE) 5-320 MG per tablet, Take 1 tablet by mouth daily, Disp: 90 tablet, Rfl: 3    atorvastatin (LIPITOR) 40 mg tablet, Take 1 tablet (40 mg total) by mouth daily, Disp: 90 tablet, Rfl: 3    Blood Glucose Monitoring Suppl (OneTouch Verio) w/Device KIT, Use daily, Disp: 1 kit, Rfl: 0    cholecalciferol (VITAMIN D3) 1,000 units tablet, Take 1 tablet (1,000 Units total) by mouth daily, Disp: 30 tablet, Rfl: 3    glucose blood (OneTouch Verio) test strip, USE TO CHECK BLOOD SUGAR EVERY MORNING, Disp: 100 strip, Rfl: 3    Lancets (OneTouch Delica Plus [id number]) MISC, USE EVERY MORNING, Disp: 100 each, Rfl: 1    latanoprost (XALATAN) 0.005 % ophthalmic solution, Administer 1 drop to both eyes daily, Disp: 2.5 mL, Rfl: 3    metFORMIN (GLUCOPHAGE) 1000 MG tablet, Take 1 tablet (1,000 mg total) by mouth 2 (two) times a day with meals, Disp: 60 tablet, Rfl: 3    Physical Exam:  /82 (BP Location: Right arm, Patient Position: Sitting, Cuff Size: Large)   Pulse 90   Temp 97.7 °F (36.5 °C) (Temporal)   Resp 16   Ht 5' 6\" (1.676 m)   Wt 93.4 kg (206 lb)   SpO2 95%   BMI 33.25 kg/m²     Physical Exam  Vitals reviewed.   Constitutional:       General: He is not in acute distress.     Appearance: He is well-developed. He is not ill-appearing.   HENT:      Head: Normocephalic and atraumatic.   Eyes:      General: No scleral icterus.     Conjunctiva/sclera: Conjunctivae normal.   Cardiovascular:      Rate and Rhythm: Normal rate and regular rhythm.      Heart sounds: Normal heart sounds. No murmur heard.  Pulmonary: "      Effort: Pulmonary effort is normal. No respiratory distress.      Breath sounds: Normal breath sounds.   Abdominal:      Palpations: Abdomen is soft.      Tenderness: There is no abdominal tenderness.   Musculoskeletal:         General: No tenderness. Normal range of motion.      Cervical back: Normal range of motion and neck supple.      Right lower leg: No edema.      Left lower leg: No edema.   Lymphadenopathy:      Cervical: No cervical adenopathy.   Skin:     General: Skin is warm and dry.   Neurological:      Mental Status: He is alert and oriented to person, place, and time.      Cranial Nerves: No cranial nerve deficit.   Psychiatric:         Mood and Affect: Mood normal.         Behavior: Behavior normal.         Labs:  Lab Results   Component Value Date    WBC 8.68 07/18/2024    HGB 14.7 07/18/2024    HCT 40.2 07/18/2024    MCV 83 07/18/2024    PLT 40 (L) 07/18/2024     I have spent 20 minutes with Patient  today in which greater than 50% of this time was spent in counseling/coordination of care regarding Diagnostic results, Risks and benefits of tx options, Instructions for management, Impressions, Documenting in the medical record, Reviewing / ordering tests, medicine, procedures  , and Obtaining or reviewing history  .     Patient voiced understanding and agreement in the above discussion. Aware to contact our office with questions/symptoms in the interim.     This note has been generated by voice recognition software system.  Therefore, there may be spelling, grammar, and or syntax errors. Please contact if questions arise.

## 2024-08-20 RX ORDER — SODIUM CHLORIDE 9 MG/ML
75 INJECTION, SOLUTION INTRAVENOUS CONTINUOUS
Status: CANCELLED | OUTPATIENT
Start: 2024-08-20

## 2024-08-22 ENCOUNTER — TELEPHONE (OUTPATIENT)
Dept: RADIOLOGY | Facility: HOSPITAL | Age: 59
End: 2024-08-22

## 2024-08-23 ENCOUNTER — TELEPHONE (OUTPATIENT)
Dept: RADIOLOGY | Facility: HOSPITAL | Age: 59
End: 2024-08-23

## 2024-08-28 ENCOUNTER — HOSPITAL ENCOUNTER (OUTPATIENT)
Dept: INTERVENTIONAL RADIOLOGY/VASCULAR | Facility: HOSPITAL | Age: 59
Discharge: HOME | End: 2024-08-28
Payer: MEDICARE

## 2024-08-28 VITALS
DIASTOLIC BLOOD PRESSURE: 96 MMHG | RESPIRATION RATE: 16 BRPM | TEMPERATURE: 97.6 F | HEART RATE: 74 BPM | SYSTOLIC BLOOD PRESSURE: 162 MMHG | OXYGEN SATURATION: 97 %

## 2024-08-28 DIAGNOSIS — R16.1 SPLENOMEGALY: ICD-10-CM

## 2024-08-28 DIAGNOSIS — D69.6 THROMBOCYTOPENIA (HCC): ICD-10-CM

## 2024-08-28 LAB
ERYTHROCYTE [DISTWIDTH] IN BLOOD BY AUTOMATED COUNT: 13.2 % (ref 11.6–15.1)
GLUCOSE SERPL-MCNC: 203 MG/DL (ref 65–140)
HCT VFR BLD AUTO: 37.1 % (ref 36.5–49.3)
HGB BLD-MCNC: 13.5 G/DL (ref 12–17)
MCH RBC QN AUTO: 29.9 PG (ref 26.8–34.3)
MCHC RBC AUTO-ENTMCNC: 36.4 G/DL (ref 31.4–37.4)
MCV RBC AUTO: 82 FL (ref 82–98)
NRBC BLD AUTO-RTO: 0 /100 WBCS
PLATELET # BLD AUTO: 49 THOUSANDS/UL (ref 149–390)
PMV BLD AUTO: 13.9 FL (ref 8.9–12.7)
RBC # BLD AUTO: 4.52 MILLION/UL (ref 3.88–5.62)
WBC # BLD AUTO: 4.98 THOUSAND/UL (ref 4.31–10.16)

## 2024-08-28 PROCEDURE — 82948 REAGENT STRIP/BLOOD GLUCOSE: CPT

## 2024-08-28 PROCEDURE — 99152 MOD SED SAME PHYS/QHP 5/>YRS: CPT

## 2024-08-28 PROCEDURE — 88185 FLOWCYTOMETRY/TC ADD-ON: CPT

## 2024-08-28 PROCEDURE — 88184 FLOWCYTOMETRY/ TC 1 MARKER: CPT | Performed by: STUDENT IN AN ORGANIZED HEALTH CARE EDUCATION/TRAINING PROGRAM

## 2024-08-28 PROCEDURE — 38222 DX BONE MARROW BX & ASPIR: CPT

## 2024-08-28 PROCEDURE — 99153 MOD SED SAME PHYS/QHP EA: CPT

## 2024-08-28 PROCEDURE — 88189 FLOWCYTOMETRY/READ 16 & >: CPT

## 2024-08-28 PROCEDURE — C1830 POWER BONE MARROW BX NEEDLE: HCPCS

## 2024-08-28 RX ORDER — SODIUM CHLORIDE 9 MG/ML
75 INJECTION, SOLUTION INTRAVENOUS CONTINUOUS
Status: DISCONTINUED | OUTPATIENT
Start: 2024-08-28 | End: 2024-08-29 | Stop reason: HOSPADM

## 2024-08-28 RX ORDER — MIDAZOLAM HYDROCHLORIDE 2 MG/2ML
INJECTION, SOLUTION INTRAMUSCULAR; INTRAVENOUS AS NEEDED
Status: COMPLETED | OUTPATIENT
Start: 2024-08-28 | End: 2024-08-28

## 2024-08-28 RX ORDER — FENTANYL CITRATE 50 UG/ML
INJECTION, SOLUTION INTRAMUSCULAR; INTRAVENOUS AS NEEDED
Status: COMPLETED | OUTPATIENT
Start: 2024-08-28 | End: 2024-08-28

## 2024-08-28 RX ADMIN — FENTANYL CITRATE 50 MCG: 50 INJECTION, SOLUTION INTRAMUSCULAR; INTRAVENOUS at 12:59

## 2024-08-28 RX ADMIN — MIDAZOLAM 1 MG: 1 INJECTION INTRAMUSCULAR; INTRAVENOUS at 12:59

## 2024-08-28 RX ADMIN — MIDAZOLAM 1 MG: 1 INJECTION INTRAMUSCULAR; INTRAVENOUS at 12:54

## 2024-08-28 RX ADMIN — SODIUM CHLORIDE 75 ML/HR: 0.9 INJECTION, SOLUTION INTRAVENOUS at 11:31

## 2024-08-28 RX ADMIN — FENTANYL CITRATE 50 MCG: 50 INJECTION, SOLUTION INTRAMUSCULAR; INTRAVENOUS at 12:55

## 2024-08-28 NOTE — BRIEF OP NOTE (RAD/CATH)
INTERVENTIONAL RADIOLOGY PROCEDURE NOTE    Date: 8/28/2024    Procedure:   Procedure Summary       Date: 08/28/24 Room / Location: [facility] Interventional Radiology    Anesthesia Start:  Anesthesia Stop:     Procedure: IR BIOPSY BONE MARROW Diagnosis:       Thrombocytopenia (HCC)      Splenomegaly      (splenomegaly, low platelets)    Scheduled Providers:  Responsible Provider:     Anesthesia Type: Not recorded ASA Status: Not recorded            Preoperative diagnosis:   1. Thrombocytopenia (HCC)    2. Splenomegaly         Postoperative diagnosis: Same.    Surgeon: Rahat Ross MD     Assistant: None. No qualified resident was available.    Blood loss: 5 ml    Specimens: sent to path     Findings:   BMB/asp L iliac crest.    Complications: None immediate.    Anesthesia: conscious sedation

## 2024-08-28 NOTE — H&P
Interventional Radiology Preprocedure Note    History/Indication for procedure:   Raulito Hallman is a 59 y.o. male with a PMH of thrombocytopenia who presents for BMB/asp.    Relevant past medical history:    Past Medical History:   Diagnosis Date    Diabetes mellitus (HCC)     Glaucoma     Hypertension      Patient Active Problem List   Diagnosis    Class 1 obesity due to excess calories without serious comorbidity with body mass index (BMI) of 32.0 to 32.9 in adult    Type 2 diabetes mellitus without complication, without long-term current use of insulin (HCC)    Other hyperlipidemia    Erectile dysfunction    History of hepatitis C    Primary hypertension       /74 (BP Location: Right arm)   Pulse 77   Temp 97.9 °F (36.6 °C) (Temporal)   Resp 18   SpO2 97%     Medications:    Inpatient Medications:     Scheduled Medications:  Current Facility-Administered Medications   Medication Dose Route Frequency Provider Last Rate    sodium chloride  75 mL/hr Intravenous Continuous Rahat Ross MD 75 mL/hr (08/28/24 1131)       Infusions:  sodium chloride, 75 mL/hr, Last Rate: 75 mL/hr (08/28/24 1131)        PRN:      Outpatient Medications:  Current Outpatient Medications on File Prior to Encounter   Medication Sig Dispense Refill    atorvastatin (LIPITOR) 40 mg tablet Take 1 tablet (40 mg total) by mouth daily 90 tablet 3    latanoprost (XALATAN) 0.005 % ophthalmic solution Administer 1 drop to both eyes daily 2.5 mL 3    metFORMIN (GLUCOPHAGE) 1000 MG tablet Take 1 tablet (1,000 mg total) by mouth 2 (two) times a day with meals 60 tablet 3    amLODIPine-valsartan (EXFORGE) 5-320 MG per tablet Take 1 tablet by mouth daily 90 tablet 3    Blood Glucose Monitoring Suppl (OneTouch Verio) w/Device KIT Use daily 1 kit 0    cholecalciferol (VITAMIN D3) 1,000 units tablet Take 1 tablet (1,000 Units total) by mouth daily 30 tablet 3    glucose blood (OneTouch Verio) test strip USE TO CHECK BLOOD SUGAR EVERY MORNING  100 strip 3    Lancets (OneTouch Delica Plus [id number]) MISC USE EVERY MORNING 100 each 1     No current facility-administered medications on file prior to encounter.       No Known Allergies    Anticoagulants: none    ASA classification: ASA 3 - Patient with moderate systemic disease with functional limitations    Airway Assessment: III (soft and hard palate and base of uvula visible)    Relevant family history: None    Relevant review of systems: None    Prior sedation/anesthesia: yes    Can the patient lie flat? Yes     NPO Status: yes    Labs:   CBC with diff:   Lab Results   Component Value Date    WBC 4.98 08/28/2024    HGB 13.5 08/28/2024    HCT 37.1 08/28/2024    MCV 82 08/28/2024    PLT 49 (L) 08/28/2024    RBC 4.52 08/28/2024    MCH 29.9 08/28/2024    MCHC 36.4 08/28/2024    RDW 13.2 08/28/2024    MPV 13.9 (H) 08/28/2024    NRBC 0 08/28/2024     BMP/CMP:  Lab Results   Component Value Date    K 4.2 06/11/2024     06/11/2024    CO2 28 06/11/2024    BUN 12 06/11/2024    CREATININE 0.85 06/11/2024    CALCIUM 9.5 06/11/2024    AST 27 06/11/2024    ALT 79 (H) 07/18/2024    ALT 47 06/11/2024    ALKPHOS 134 (H) 06/11/2024    EGFR 97 06/11/2024   ,     Coags:   Lab Results   Component Value Date    PTT 27 08/31/2021    INR 1.15 08/31/2021   ,          Relevant imaging studies:   Reviewed.    Directed physical examination:  I agree with the physical exam performed on 8/13/24 and there are no additional changes.    Assessment/Plan:   BMB/asp.    Sedation/Anesthesia plan:  Moderate sedation will be used as needed for procedure.    Consent with alternatives to the procedure, risks and benefits have been explained and discussed with the patient/patient's family: yes.

## 2024-08-28 NOTE — NURSING NOTE
No distress. Denies pain. Band-aid clean, dry and intact. Left via ambulatory with his wife. Declined wheelchair to lobby.

## 2024-08-28 NOTE — DISCHARGE INSTRUCTIONS
Bone Marrow Biopsy     WHAT YOU NEED TO KNOW:   A bone marrow biopsy is a procedure to remove a small amount of bone marrow from your bone. Bone marrow is the soft tissue inside your bone that helps to make blood cells. The sample is tested for disease or infection.    DISCHARGE INSTRUCTIONS:     1. Limit your activities day of biopsy as directed by your doctor.    2. Use medication as ordered.    3. Return to your normal diet.Small sips of flat soda will help with nausea.    4. Remove band-aid or dressing 24 hours after procedure.    Contact Interventional Radiology at [phone] if:    1. Difficulty breathing, nausea or vomiting.    2. Chills or fever above 101 F.    3. Pain at biopsy site not relieved by medication    4. Develop any redness, swelling, heat, unusual drainage, heavy bruising or bleeding from biopsy site.    Your skin is itchy, swollen, or you have a rash.     You have nausea or are vomiting for more than 8 hours after the procedure.      You have questions or concerns about your condition or care. Procedural Sedation   WHAT YOU NEED TO KNOW:   Procedural sedation is medicine used during procedures to help you feel relaxed and calm. You will remember little to none of the procedure. After sedation you may feel tired, weak, or unsteady on your feet. You may also have trouble concentrating or short-term memory loss. These symptoms should go away in 24 hours or less.   DISCHARGE INSTRUCTIONS:   Call 911 or have someone else call for any of the following:   You have sudden trouble breathing.     You cannot be woken.     Contact Interventional Radiology at [phone]   [facility] PATIENTS: Contact Interventional Radiology at [phone] [facility] PATIENTS: Contact Interventional Radiology at [phone]) if any of the following occur:      You have a severe headache or dizziness.     Your heart is beating faster than usual.    You have a fever or chills.     Your skin is itchy, swollen, or you have a  rash.     You have nausea or are vomiting for more than 8 hours after the procedure.      You have questions or concerns about your condition or care.  Self-care:   Have someone stay with you for 24 hours. This person can drive you to errands and help you do things around the house. This person can also watch for problems.      Rest and do quiet activities for 24 hours. Do not exercise, ride a bike, or play sports. Stand up slowly to prevent dizziness and falls. Take short walks around the house with another person. Slowly return to your usual activities the next day.      Do not drive or use dangerous machines or tools for 24 hours. You may injure yourself or others. Examples include a lawnmower, saw, or drill. Do not return to work for 24 hours if you use dangerous machines or tools for work.      Do not make important decisions for 24 hours. For example, do not sign important papers or invest money.      Drink liquids as directed. Liquids help flush the sedation medicine out of your body. Ask how much liquid to drink each day and which liquids are best for you.      Eat small, frequent meals to prevent nausea and vomiting. Start with clear liquids such as juice or broth. If you do not vomit after clear liquids, you can eat your usual foods.      Do not drink alcohol or take medicines that make you drowsy. This includes medicines that help you sleep and anxiety medicines. Ask your healthcare provider if it is safe for you to take pain medicine.  Follow up with your healthcare provider as directed: Write down your questions so you remember to ask them during your visits.

## 2024-08-29 ENCOUNTER — APPOINTMENT (OUTPATIENT)
Dept: LAB | Facility: HOSPITAL | Age: 59
End: 2024-08-29
Payer: MEDICARE

## 2024-08-29 ENCOUNTER — OFFICE VISIT (OUTPATIENT)
Dept: FAMILY MEDICINE CLINIC | Facility: CLINIC | Age: 59
End: 2024-08-29

## 2024-08-29 VITALS
DIASTOLIC BLOOD PRESSURE: 86 MMHG | BODY MASS INDEX: 33.41 KG/M2 | RESPIRATION RATE: 18 BRPM | HEART RATE: 96 BPM | HEIGHT: 66 IN | TEMPERATURE: 98.2 F | WEIGHT: 207.9 LBS | SYSTOLIC BLOOD PRESSURE: 152 MMHG | OXYGEN SATURATION: 97 %

## 2024-08-29 DIAGNOSIS — L21.9 SEBORRHEIC DERMATITIS: ICD-10-CM

## 2024-08-29 DIAGNOSIS — E78.49 OTHER HYPERLIPIDEMIA: ICD-10-CM

## 2024-08-29 DIAGNOSIS — Z76.0 MEDICATION REFILL: ICD-10-CM

## 2024-08-29 DIAGNOSIS — E11.9 TYPE 2 DIABETES MELLITUS WITHOUT COMPLICATION, WITHOUT LONG-TERM CURRENT USE OF INSULIN (HCC): Primary | ICD-10-CM

## 2024-08-29 DIAGNOSIS — E66.09 CLASS 1 OBESITY DUE TO EXCESS CALORIES WITHOUT SERIOUS COMORBIDITY WITH BODY MASS INDEX (BMI) OF 32.0 TO 32.9 IN ADULT: ICD-10-CM

## 2024-08-29 DIAGNOSIS — E11.9 TYPE 2 DIABETES MELLITUS WITHOUT COMPLICATION, WITHOUT LONG-TERM CURRENT USE OF INSULIN (HCC): ICD-10-CM

## 2024-08-29 DIAGNOSIS — I10 PRIMARY HYPERTENSION: ICD-10-CM

## 2024-08-29 LAB
EST. AVERAGE GLUCOSE BLD GHB EST-MCNC: 235 MG/DL
HBA1C MFR BLD: 9.8 %
SL AMB POCT HEMOGLOBIN AIC: 9.5 (ref ?–6.5)

## 2024-08-29 PROCEDURE — 83036 HEMOGLOBIN GLYCOSYLATED A1C: CPT

## 2024-08-29 PROCEDURE — 99214 OFFICE O/P EST MOD 30 MIN: CPT

## 2024-08-29 PROCEDURE — 36415 COLL VENOUS BLD VENIPUNCTURE: CPT

## 2024-08-29 RX ORDER — CHOLECALCIFEROL (VITAMIN D3) 25 MCG
1000 TABLET ORAL DAILY
Qty: 90 TABLET | Refills: 3 | Status: SHIPPED | OUTPATIENT
Start: 2024-08-29

## 2024-08-29 RX ORDER — AMLODIPINE AND VALSARTAN 5; 320 MG/1; MG/1
1 TABLET ORAL DAILY
Qty: 90 TABLET | Refills: 3 | Status: SHIPPED | OUTPATIENT
Start: 2024-08-29

## 2024-08-29 RX ORDER — LATANOPROST 50 UG/ML
1 SOLUTION/ DROPS OPHTHALMIC DAILY
Qty: 2.5 ML | Refills: 3 | Status: SHIPPED | OUTPATIENT
Start: 2024-08-29

## 2024-08-29 RX ORDER — KETOCONAZOLE 20 MG/ML
1 SHAMPOO TOPICAL 2 TIMES WEEKLY
Qty: 120 ML | Refills: 0 | Status: SHIPPED | OUTPATIENT
Start: 2024-08-29

## 2024-08-29 RX ORDER — ATORVASTATIN CALCIUM 40 MG/1
40 TABLET, FILM COATED ORAL DAILY
Qty: 90 TABLET | Refills: 3 | Status: SHIPPED | OUTPATIENT
Start: 2024-08-29

## 2024-08-29 NOTE — PROGRESS NOTES
Ambulatory Visit  Name: Raulito Hallman      : 1965      MRN: [id number]  Encounter Provider: HARISH Noble  Encounter Date: 2024   Encounter department: [facility]    Assessment & Plan   1. Type 2 diabetes mellitus without complication, without long-term current use of insulin (HCC)  Assessment & Plan:    Lab Results   Component Value Date    HGBA1C 6.9 (A) 2024    Poct A1c is 9.5  Reports that he checks home BGs about once a week. They are in the low to mid 100s. He reports compliance with metformin 1000 mg BID. Denies any sudden changes in his diet or activity level. Will check A1c via labs. Add Jardiance if A1c remains elevated.   Orders:  -     POCT hemoglobin A1c  -     Hemoglobin A1C; Future  -     metFORMIN (GLUCOPHAGE) 1000 MG tablet; Take 1 tablet (1,000 mg total) by mouth 2 (two) times a day with meals  2. Primary hypertension  Assessment & Plan:  BP Readings from Last 3 Encounters:   24 152/86   24 162/96   24 130/82     - Reports that ghe did not take exforge due to getting biopsy done yesterday. RTC in one month to reassess  Orders:  -     amLODIPine-valsartan (EXFORGE) 5-320 MG per tablet; Take 1 tablet by mouth daily  3. Other hyperlipidemia  Assessment & Plan:  Lab Results   Component Value Date    CHOLESTEROL 89 2024    CHOLESTEROL 119 10/29/2022    CHOLESTEROL 117 2021     Lab Results   Component Value Date    HDL 54 2024    HDL 39 (L) 10/29/2022    HDL 36 (L) 2021     Lab Results   Component Value Date    TRIG 74 2024    TRIG 84 10/29/2022    TRIG 99 2021     Lab Results   Component Value Date    NONHDLC 35 2024     Lab Results   Component Value Date    LDLCALC 20 2024     - At goal. Continue Lipitor 40 mg daily.   Orders:  -     atorvastatin (LIPITOR) 40 mg tablet; Take 1 tablet (40 mg total) by mouth daily  4. Class 1 obesity due to excess calories without  "serious comorbidity with body mass index (BMI) of 32.0 to 32.9 in adult  Assessment & Plan:  BMI Counseling: Body mass index is 33.56 kg/m². The BMI is above normal. Nutrition recommendations include reducing portion sizes, decreasing overall calorie intake, 3-5 servings of fruits/vegetables daily, reducing fast food intake, consuming healthier snacks, decreasing soda and/or juice intake, moderation in carbohydrate intake, increasing intake of lean protein, reducing intake of saturated fat and trans fat, and reducing intake of cholesterol. Exercise recommendations include moderate aerobic physical activity for 150 minutes/week.    Orders:  -     Ambulatory Referral to Weight Management; Future  5. Seborrheic dermatitis  -     ketoconazole (NIZORAL) 2 % shampoo; Apply 1 Application topically 2 (two) times a week  6. Medication refill  -     cholecalciferol (VITAMIN D3) 1,000 units tablet; Take 1 tablet (1,000 Units total) by mouth daily  -     latanoprost (XALATAN) 0.005 % ophthalmic solution; Administer 1 drop to both eyes daily       History of Present Illness     Raulito Hallman is a 59 y.o. male  has a past medical history of Diabetes mellitus (HCC), Glaucoma, and Hypertension.  has a past surgical history that includes Hernia repair; Carpal tunnel release; and IR biopsy bone marrow (8/28/2024).    Patient presents today for follow-up of chronic conditions.  He reports flaky and itchy skin on beard area.       Review of Systems  As per HPI      Objective     /86 (BP Location: Left arm, Patient Position: Sitting, Cuff Size: Standard) Comment: Pt did not take bp meds due to procedure he had yesterday  Pulse 96   Temp 98.2 °F (36.8 °C) (Temporal)   Resp 18   Ht 5' 6\" (1.676 m)   Wt 94.3 kg (207 lb 14.4 oz)   SpO2 97%   BMI 33.56 kg/m²     Physical Exam  Vitals and nursing note reviewed.   Constitutional:       Appearance: He is well-developed. He is obese.   HENT:      Head: Normocephalic and " atraumatic.      Right Ear: External ear normal.      Left Ear: External ear normal.      Nose: Nose normal.   Eyes:      Conjunctiva/sclera: Conjunctivae normal.   Cardiovascular:      Rate and Rhythm: Normal rate and regular rhythm.      Pulses: Normal pulses.      Heart sounds: Normal heart sounds. No murmur heard.  Pulmonary:      Effort: Pulmonary effort is normal. No respiratory distress.      Breath sounds: Normal breath sounds.   Abdominal:      General: Bowel sounds are normal.      Palpations: Abdomen is soft.      Tenderness: There is no abdominal tenderness.   Musculoskeletal:         General: Normal range of motion.      Cervical back: Normal range of motion and neck supple.   Skin:     General: Skin is warm and dry.      Capillary Refill: Capillary refill takes less than 2 seconds.      Comments: Flaky and dry skin noted  on beard   Neurological:      General: No focal deficit present.      Mental Status: He is alert and oriented to person, place, and time. Mental status is at baseline.   Psychiatric:         Mood and Affect: Mood normal.         Behavior: Behavior normal.         Thought Content: Thought content normal.         Judgment: Judgment normal.     Administrative Statements

## 2024-08-29 NOTE — ASSESSMENT & PLAN NOTE
BP Readings from Last 3 Encounters:   08/29/24 152/86   08/28/24 162/96   08/13/24 130/82     - Reports that ghe did not take exforge due to getting biopsy done yesterday. RTC in one month to reassess

## 2024-08-29 NOTE — ASSESSMENT & PLAN NOTE
BMI Counseling: Body mass index is 33.56 kg/m². The BMI is above normal. Nutrition recommendations include reducing portion sizes, decreasing overall calorie intake, 3-5 servings of fruits/vegetables daily, reducing fast food intake, consuming healthier snacks, decreasing soda and/or juice intake, moderation in carbohydrate intake, increasing intake of lean protein, reducing intake of saturated fat and trans fat, and reducing intake of cholesterol. Exercise recommendations include moderate aerobic physical activity for 150 minutes/week.

## 2024-08-29 NOTE — ASSESSMENT & PLAN NOTE
Lab Results   Component Value Date    HGBA1C 6.9 (A) 04/25/2024    Poct A1c is 9.5  Reports that he checks home BGs about once a week. They are in the low to mid 100s. He reports compliance with metformin 1000 mg BID. Denies any sudden changes in his diet or activity level. Will check A1c via labs. Add Jardiance if A1c remains elevated.

## 2024-08-29 NOTE — ASSESSMENT & PLAN NOTE
Lab Results   Component Value Date    CHOLESTEROL 89 04/25/2024    CHOLESTEROL 119 10/29/2022    CHOLESTEROL 117 06/12/2021     Lab Results   Component Value Date    HDL 54 04/25/2024    HDL 39 (L) 10/29/2022    HDL 36 (L) 06/12/2021     Lab Results   Component Value Date    TRIG 74 04/25/2024    TRIG 84 10/29/2022    TRIG 99 06/12/2021     Lab Results   Component Value Date    NONHDLC 35 04/25/2024     Lab Results   Component Value Date    LDLCALC 20 04/25/2024     - At goal. Continue Lipitor 40 mg daily.

## 2024-08-30 DIAGNOSIS — E11.9 TYPE 2 DIABETES MELLITUS WITHOUT COMPLICATION, WITHOUT LONG-TERM CURRENT USE OF INSULIN (HCC): Primary | ICD-10-CM

## 2024-08-30 LAB — SCAN RESULT: NORMAL

## 2024-09-05 ENCOUNTER — TELEPHONE (OUTPATIENT)
Dept: FAMILY MEDICINE CLINIC | Facility: CLINIC | Age: 59
End: 2024-09-05

## 2024-09-05 LAB
MISCELLANEOUS LAB TEST RESULT: NORMAL
SCAN RESULT: NORMAL

## 2024-09-05 NOTE — TELEPHONE ENCOUNTER
Konrad Rodriguez,   Patient Raulito contacted office in regards to wanting to verify if he should be taking   metFORMIN (GLUCOPHAGE) 1000 MG tablet [[id number]] & Empagliflozin 25 MG TABS [[id number]] together at the same time. Please advise?

## 2024-09-12 LAB
EOSINOPHIL # BLD AUTO: 0.05 THOUSAND/UL (ref 0–0.61)
EOSINOPHIL NFR BLD MANUAL: 1 % (ref 0–6)
LYMPHOCYTES # BLD AUTO: 1 THOUSAND/UL (ref 0.6–4.47)
LYMPHOCYTES # BLD AUTO: 15 %
MONOCYTES # BLD AUTO: 0.35 THOUSAND/UL (ref 0–1.22)
MONOCYTES NFR BLD AUTO: 7 % (ref 4–12)
NEUTS BAND NFR BLD MANUAL: 2 % (ref 0–8)
NEUTS SEG # BLD: 3.59 THOUSAND/UL (ref 1.81–6.82)
NEUTS SEG NFR BLD AUTO: 70 %
PATHOLOGY REVIEW: YES
PLATELET BLD QL SMEAR: ADEQUATE
RBC MORPH BLD: NORMAL
TOTAL CELLS COUNTED SPEC: 100
VARIANT LYMPHS # BLD AUTO: 5 % (ref 0–0)

## 2024-09-18 ENCOUNTER — OFFICE VISIT (OUTPATIENT)
Dept: HEMATOLOGY ONCOLOGY | Facility: CLINIC | Age: 59
End: 2024-09-18
Payer: MEDICARE

## 2024-09-18 VITALS
WEIGHT: 206 LBS | SYSTOLIC BLOOD PRESSURE: 156 MMHG | DIASTOLIC BLOOD PRESSURE: 84 MMHG | OXYGEN SATURATION: 98 % | RESPIRATION RATE: 18 BRPM | HEIGHT: 66 IN | TEMPERATURE: 97.2 F | HEART RATE: 88 BPM | BODY MASS INDEX: 33.11 KG/M2

## 2024-09-18 DIAGNOSIS — R16.1 SPLENOMEGALY: ICD-10-CM

## 2024-09-18 DIAGNOSIS — D69.6 THROMBOCYTOPENIA (HCC): Primary | ICD-10-CM

## 2024-09-18 LAB — MISCELLANEOUS LAB TEST RESULT: NORMAL

## 2024-09-18 PROCEDURE — 99214 OFFICE O/P EST MOD 30 MIN: CPT | Performed by: PHYSICIAN ASSISTANT

## 2024-09-18 RX ORDER — PANTOPRAZOLE SODIUM 20 MG/1
20 TABLET, DELAYED RELEASE ORAL DAILY
Qty: 14 TABLET | Refills: 0 | Status: SHIPPED | OUTPATIENT
Start: 2024-09-18

## 2024-09-18 RX ORDER — DEXAMETHASONE 4 MG/1
40 TABLET ORAL
Qty: 40 TABLET | Refills: 0 | Status: SHIPPED | OUTPATIENT
Start: 2024-09-18 | End: 2024-09-22

## 2024-09-18 NOTE — PROGRESS NOTES
Hematology/Oncology Outpatient Follow-up  Raulito Hallman 59 y.o. male 1965 [id number]    Date:  9/18/2024      Assessment and Plan:  1. Thrombocytopenia (HCC) 2. Splenomegaly  Platelet count remains low, a pulsed dose of Decadron (20 mg PO x 4 days) was trialed to see if diagnosis favored ITP.      He did not have any response with this.    Possibly a longer steroid taper/higher dose would be needed however with his splenomegaly and thrombocytopenia that is persistent and stable, concern for lymphoproliferative disorder.    He underwent bone marrow biopsy which was negative for myeloproliferative or lymphoproliferative disorder    Platelet count still remains low in the 40,000 range therefore we will trial a higher dose of steroid, Decadron 40 mg daily for 4 day.    -Start Decadron today, repeat labs next Wednesday or Thursday, also PPI prophylaxis for protection of any GI bleeding.   -Monitor fasting glucose each morning, call here or PCP with glucose 400 or higher    - follow up in 2 weeks    - if not responding or glucose too high, to then consider IVIG    - dexamethasone (DECADRON) 4 mg tablet; Take 10 tablets (40 mg total) by mouth daily with breakfast for 4 days for 4 days  Dispense: 40 tablet; Refill: 0  - CBC and differential; Future  - CBC and differential; Future  - pantoprazole (PROTONIX) 20 mg tablet; Take 1 tablet (20 mg total) by mouth daily  Dispense: 14 tablet; Refill: 0    HPI:  59-year-old male presents for follow-up visit for thrombocytopenia.      Patient was sent for thrombocytopenia.  Patient history of hepatitis C in 2003/4 for which he received treatment for 1 year and then had not followed up with GI.  He has seen GI recently.  There is no mention of cirrhosis on the liver specific ultrasound they performed; no hepatomegaly.     Interval history:    ROS: Review of Systems   Constitutional:  Negative for activity change, appetite change, chills, fatigue, fever and unexpected weight  change.   HENT:  Negative for nosebleeds.    Respiratory:  Negative for cough and shortness of breath.    Cardiovascular:  Negative for chest pain, palpitations and leg swelling.   Gastrointestinal:  Negative for abdominal pain, blood in stool, constipation, diarrhea, nausea and vomiting.   Genitourinary:  Negative for difficulty urinating, dysuria and hematuria.   Musculoskeletal:  Negative for arthralgias.   Skin: Negative.         Denies bruising    Neurological:  Negative for dizziness, weakness, light-headedness, numbness and headaches.   Hematological: Negative.    Psychiatric/Behavioral: Negative.       Past Medical History:   Diagnosis Date    Diabetes mellitus (HCC)     Glaucoma     Hypertension        Past Surgical History:   Procedure Laterality Date    CARPAL TUNNEL RELEASE      HERNIA REPAIR      IR BIOPSY BONE MARROW  8/28/2024       Social History     Socioeconomic History    Marital status: /Civil Union     Spouse name: None    Number of children: None    Years of education: None    Highest education level: None   Occupational History    None   Tobacco Use    Smoking status: Never     Passive exposure: Never    Smokeless tobacco: Never   Vaping Use    Vaping status: Never Used   Substance and Sexual Activity    Alcohol use: Never    Drug use: Never    Sexual activity: Yes     Partners: Female   Other Topics Concern    None   Social History Narrative    None     Social Determinants of Health     Financial Resource Strain: Low Risk  (4/25/2024)    Overall Financial Resource Strain (CARDIA)     Difficulty of Paying Living Expenses: Not hard at all   Food Insecurity: No Food Insecurity (4/25/2024)    Hunger Vital Sign     Worried About Running Out of Food in the Last Year: Never true     Ran Out of Food in the Last Year: Never true   Transportation Needs: No Transportation Needs (4/25/2024)    PRAPARE - Transportation     Lack of Transportation (Medical): No     Lack of Transportation  "(Non-Medical): No   Physical Activity: Not on file   Stress: Not on file   Social Connections: Not on file   Intimate Partner Violence: Not on file   Housing Stability: Low Risk  (4/25/2024)    Housing Stability Vital Sign     Unable to Pay for Housing in the Last Year: No     Number of Times Moved in the Last Year: 1     Homeless in the Last Year: No       Family History   Problem Relation Age of Onset    Diabetes Father        No Known Allergies      Current Outpatient Medications:     amLODIPine-valsartan (EXFORGE) 5-320 MG per tablet, Take 1 tablet by mouth daily, Disp: 90 tablet, Rfl: 3    atorvastatin (LIPITOR) 40 mg tablet, Take 1 tablet (40 mg total) by mouth daily, Disp: 90 tablet, Rfl: 3    Blood Glucose Monitoring Suppl (OneTouch Verio) w/Device KIT, Use daily, Disp: 1 kit, Rfl: 0    cholecalciferol (VITAMIN D3) 1,000 units tablet, Take 1 tablet (1,000 Units total) by mouth daily, Disp: 90 tablet, Rfl: 3    Empagliflozin 25 MG TABS, Take 1 tablet (25 mg total) by mouth daily, Disp: 90 tablet, Rfl: 2    glucose blood (OneTouch Verio) test strip, USE TO CHECK BLOOD SUGAR EVERY MORNING, Disp: 100 strip, Rfl: 3    ketoconazole (NIZORAL) 2 % shampoo, Apply 1 Application topically 2 (two) times a week, Disp: 120 mL, Rfl: 0    Lancets (OneTouch Delica Plus [id number]) MISC, USE EVERY MORNING, Disp: 100 each, Rfl: 1    latanoprost (XALATAN) 0.005 % ophthalmic solution, Administer 1 drop to both eyes daily, Disp: 2.5 mL, Rfl: 3    metFORMIN (GLUCOPHAGE) 1000 MG tablet, Take 1 tablet (1,000 mg total) by mouth 2 (two) times a day with meals, Disp: 180 tablet, Rfl: 3    Physical Exam:  /84 (BP Location: Right arm, Patient Position: Sitting, Cuff Size: Large)   Pulse 88   Temp (!) 97.2 °F (36.2 °C) (Temporal)   Resp 18   Ht 5' 6\" (1.676 m)   Wt 93.4 kg (206 lb)   SpO2 98%   BMI 33.25 kg/m²     Physical Exam  Vitals reviewed.   Constitutional:       General: He is not in acute distress.     Appearance: He " is well-developed. He is not ill-appearing.   HENT:      Head: Normocephalic and atraumatic.   Eyes:      General: No scleral icterus.     Conjunctiva/sclera: Conjunctivae normal.   Cardiovascular:      Rate and Rhythm: Normal rate and regular rhythm.      Heart sounds: Normal heart sounds. No murmur heard.  Pulmonary:      Effort: Pulmonary effort is normal. No respiratory distress.      Breath sounds: Normal breath sounds.   Abdominal:      Palpations: Abdomen is soft.      Tenderness: There is no abdominal tenderness.   Musculoskeletal:         General: No tenderness. Normal range of motion.      Cervical back: Normal range of motion and neck supple.      Right lower leg: No edema.      Left lower leg: No edema.   Lymphadenopathy:      Cervical: No cervical adenopathy.   Skin:     General: Skin is warm and dry.   Neurological:      Mental Status: He is alert and oriented to person, place, and time.      Cranial Nerves: No cranial nerve deficit.   Psychiatric:         Mood and Affect: Mood normal.         Behavior: Behavior normal.       Labs:  Lab Results   Component Value Date    WBC 4.98 08/28/2024    HGB 13.5 08/28/2024    HCT 37.1 08/28/2024    MCV 82 08/28/2024    PLT 49 (L) 08/28/2024       I have spent 30 minutes with Patient  today in which greater than 50% of this time was spent in counseling/coordination of care regarding Diagnostic results, Risks and benefits of tx options, Instructions for management, Patient and family education, Impressions, Documenting in the medical record, Reviewing / ordering tests, medicine, procedures  , and Obtaining or reviewing history  .     [employer]  used.     Patient voiced understanding and agreement in the above discussion. Aware to contact our office with questions/symptoms in the interim.     This note has been generated by voice recognition software system.  Therefore, there may be spelling, grammar, and or syntax errors. Please contact if questions  arise.

## 2024-09-25 ENCOUNTER — APPOINTMENT (OUTPATIENT)
Dept: LAB | Facility: HOSPITAL | Age: 59
End: 2024-09-25
Payer: MEDICARE

## 2024-09-25 ENCOUNTER — TELEPHONE (OUTPATIENT)
Dept: FAMILY MEDICINE CLINIC | Facility: CLINIC | Age: 59
End: 2024-09-25

## 2024-09-25 DIAGNOSIS — D69.6 THROMBOCYTOPENIA (HCC): ICD-10-CM

## 2024-09-25 DIAGNOSIS — I10 PRIMARY HYPERTENSION: Primary | ICD-10-CM

## 2024-09-25 DIAGNOSIS — R16.1 SPLENOMEGALY: ICD-10-CM

## 2024-09-25 LAB
BASOPHILS # BLD AUTO: 0.02 THOUSANDS/ΜL (ref 0–0.1)
BASOPHILS NFR BLD AUTO: 0 % (ref 0–1)
EOSINOPHIL # BLD AUTO: 0.17 THOUSAND/ΜL (ref 0–0.61)
EOSINOPHIL NFR BLD AUTO: 2 % (ref 0–6)
ERYTHROCYTE [DISTWIDTH] IN BLOOD BY AUTOMATED COUNT: 13.5 % (ref 11.6–15.1)
HCT VFR BLD AUTO: 42.8 % (ref 36.5–49.3)
HGB BLD-MCNC: 14.7 G/DL (ref 12–17)
IMM GRANULOCYTES # BLD AUTO: 0.16 THOUSAND/UL (ref 0–0.2)
IMM GRANULOCYTES NFR BLD AUTO: 2 % (ref 0–2)
LYMPHOCYTES # BLD AUTO: 1.51 THOUSANDS/ΜL (ref 0.6–4.47)
LYMPHOCYTES NFR BLD AUTO: 19 % (ref 14–44)
MCH RBC QN AUTO: 29.5 PG (ref 26.8–34.3)
MCHC RBC AUTO-ENTMCNC: 34.3 G/DL (ref 31.4–37.4)
MCV RBC AUTO: 86 FL (ref 82–98)
MONOCYTES # BLD AUTO: 0.57 THOUSAND/ΜL (ref 0.17–1.22)
MONOCYTES NFR BLD AUTO: 7 % (ref 4–12)
NEUTROPHILS # BLD AUTO: 5.55 THOUSANDS/ΜL (ref 1.85–7.62)
NEUTS SEG NFR BLD AUTO: 70 % (ref 43–75)
NRBC BLD AUTO-RTO: 0 /100 WBCS
PLATELET # BLD AUTO: 60 THOUSANDS/UL (ref 149–390)
PMV BLD AUTO: 13.3 FL (ref 8.9–12.7)
RBC # BLD AUTO: 4.98 MILLION/UL (ref 3.88–5.62)
WBC # BLD AUTO: 7.98 THOUSAND/UL (ref 4.31–10.16)

## 2024-09-25 PROCEDURE — 36415 COLL VENOUS BLD VENIPUNCTURE: CPT

## 2024-09-25 PROCEDURE — 85025 COMPLETE CBC W/AUTO DIFF WBC: CPT

## 2024-09-25 RX ORDER — BLOOD PRESSURE TEST KIT
KIT MISCELLANEOUS DAILY
Qty: 1 KIT | Refills: 0 | Status: SHIPPED | OUTPATIENT
Start: 2024-09-25

## 2024-09-25 NOTE — TELEPHONE ENCOUNTER
Good alex Rodriguez,    Patient Raulito came into office today in regards to requesting if you could provide him with a script to receive blood pressure machine. Please advise?

## 2024-09-25 NOTE — TELEPHONE ENCOUNTER
Patient was relayed information and he will call pharmacy and possibly insurance to verify cvg. Patient will call back with information.

## 2024-10-02 LAB — SCAN RESULT: NORMAL

## 2024-10-04 ENCOUNTER — CLINICAL SUPPORT (OUTPATIENT)
Dept: FAMILY MEDICINE CLINIC | Facility: CLINIC | Age: 59
End: 2024-10-04

## 2024-10-04 VITALS — DIASTOLIC BLOOD PRESSURE: 80 MMHG | SYSTOLIC BLOOD PRESSURE: 130 MMHG | HEART RATE: 89 BPM

## 2024-10-04 DIAGNOSIS — I10 PRIMARY HYPERTENSION: Primary | ICD-10-CM

## 2024-10-04 NOTE — PROGRESS NOTES
Pt presented in office today for BP check.    Pt stated he took his medications at 9AM this morning.

## 2024-10-09 ENCOUNTER — OFFICE VISIT (OUTPATIENT)
Dept: HEMATOLOGY ONCOLOGY | Facility: CLINIC | Age: 59
End: 2024-10-09
Payer: MEDICARE

## 2024-10-09 VITALS
TEMPERATURE: 97.3 F | HEIGHT: 66 IN | BODY MASS INDEX: 32.3 KG/M2 | OXYGEN SATURATION: 97 % | WEIGHT: 201 LBS | RESPIRATION RATE: 18 BRPM | HEART RATE: 90 BPM | SYSTOLIC BLOOD PRESSURE: 146 MMHG | DIASTOLIC BLOOD PRESSURE: 90 MMHG

## 2024-10-09 DIAGNOSIS — D69.6 THROMBOCYTOPENIA (HCC): Primary | ICD-10-CM

## 2024-10-09 PROCEDURE — 99214 OFFICE O/P EST MOD 30 MIN: CPT | Performed by: PHYSICIAN ASSISTANT

## 2024-10-09 NOTE — PROGRESS NOTES
Hematology/Oncology Outpatient Follow-up  Raulito Hallman 59 y.o. male 1965 [id number]    Date:  10/9/2024      Assessment and Plan:  1. Thrombocytopenia (HCC)  Platelet count remains low, a pulsed dose of Decadron (20 mg PO x 4 days) was trialed to see if diagnosis favored ITP.       He did not have any response with this.     Possibly a longer steroid taper/higher dose would be needed however with his splenomegaly and thrombocytopenia that is persistent and stable, concern for lymphoproliferative disorder.     He underwent bone marrow biopsy which was negative for myeloproliferative or lymphoproliferative disorder     Platelet count still remains low in the 40,000 range therefore we will trial a higher dose of steroid, Decadron 40 mg daily for 4 day.    - he had response with decadron -- platelet count improved to 60,000              - repeat labs this week    - will determine if proceed with longer steroid taper or IVIG    - CBC and differential; Future     HPI:  59-year-old male presents for follow-up visit for thrombocytopenia.      Patient was sent for thrombocytopenia.  Patient history of hepatitis C in 2003/4 for which he received treatment for 1 year and then had not followed up with GI.  He has seen GI recently.  There is no mention of cirrhosis on the liver specific ultrasound they performed; no hepatomegaly.     Interval history:  When on steroids, highest glucose was 197     ROS: Review of Systems   Constitutional:  Negative for activity change, appetite change, chills, fatigue, fever and unexpected weight change.   HENT:  Negative for mouth sores and nosebleeds.    Respiratory:  Negative for cough and shortness of breath.    Cardiovascular:  Negative for chest pain, palpitations and leg swelling.   Gastrointestinal:  Negative for abdominal pain, blood in stool, constipation, diarrhea, nausea and vomiting.   Genitourinary:  Negative for difficulty urinating, dysuria and hematuria.    Musculoskeletal:  Negative for arthralgias.   Skin: Negative.    Neurological:  Negative for dizziness, weakness, light-headedness, numbness and headaches.   Hematological: Negative.    Psychiatric/Behavioral: Negative.         Past Medical History:   Diagnosis Date    Diabetes mellitus (HCC)     Glaucoma     Hypertension        Past Surgical History:   Procedure Laterality Date    CARPAL TUNNEL RELEASE      HERNIA REPAIR      IR BIOPSY BONE MARROW  8/28/2024       Social History     Socioeconomic History    Marital status: /Civil Union     Spouse name: None    Number of children: None    Years of education: None    Highest education level: None   Occupational History    None   Tobacco Use    Smoking status: Never     Passive exposure: Never    Smokeless tobacco: Never   Vaping Use    Vaping status: Never Used   Substance and Sexual Activity    Alcohol use: Never    Drug use: Never    Sexual activity: Yes     Partners: Female   Other Topics Concern    None   Social History Narrative    None     Social Determinants of Health     Financial Resource Strain: Low Risk  (4/25/2024)    Overall Financial Resource Strain (CARDIA)     Difficulty of Paying Living Expenses: Not hard at all   Food Insecurity: No Food Insecurity (4/25/2024)    Hunger Vital Sign     Worried About Running Out of Food in the Last Year: Never true     Ran Out of Food in the Last Year: Never true   Transportation Needs: No Transportation Needs (4/25/2024)    PRAPARE - Transportation     Lack of Transportation (Medical): No     Lack of Transportation (Non-Medical): No   Physical Activity: Not on file   Stress: Not on file   Social Connections: Not on file   Intimate Partner Violence: Not on file   Housing Stability: Low Risk  (4/25/2024)    Housing Stability Vital Sign     Unable to Pay for Housing in the Last Year: No     Number of Times Moved in the Last Year: 1     Homeless in the Last Year: No       Family History   Problem Relation Age of  "Onset    Diabetes Father        No Known Allergies      Current Outpatient Medications:     amLODIPine-valsartan (EXFORGE) 5-320 MG per tablet, Take 1 tablet by mouth daily, Disp: 90 tablet, Rfl: 3    atorvastatin (LIPITOR) 40 mg tablet, Take 1 tablet (40 mg total) by mouth daily, Disp: 90 tablet, Rfl: 3    Blood Glucose Monitoring Suppl (OneTouch Verio) w/Device KIT, Use daily, Disp: 1 kit, Rfl: 0    Blood Pressure KIT, Use in the morning, Disp: 1 kit, Rfl: 0    cholecalciferol (VITAMIN D3) 1,000 units tablet, Take 1 tablet (1,000 Units total) by mouth daily, Disp: 90 tablet, Rfl: 3    Empagliflozin 25 MG TABS, Take 1 tablet (25 mg total) by mouth daily, Disp: 90 tablet, Rfl: 2    glucose blood (OneTouch Verio) test strip, USE TO CHECK BLOOD SUGAR EVERY MORNING, Disp: 100 strip, Rfl: 3    ketoconazole (NIZORAL) 2 % shampoo, Apply 1 Application topically 2 (two) times a week, Disp: 120 mL, Rfl: 0    Lancets (OneTouch Delica Plus [id number]) MISC, USE EVERY MORNING, Disp: 100 each, Rfl: 1    latanoprost (XALATAN) 0.005 % ophthalmic solution, Administer 1 drop to both eyes daily, Disp: 2.5 mL, Rfl: 3    metFORMIN (GLUCOPHAGE) 1000 MG tablet, Take 1 tablet (1,000 mg total) by mouth 2 (two) times a day with meals, Disp: 180 tablet, Rfl: 3    pantoprazole (PROTONIX) 20 mg tablet, Take 1 tablet (20 mg total) by mouth daily, Disp: 14 tablet, Rfl: 0    Physical Exam:  /90 (BP Location: Left arm, Patient Position: Sitting, Cuff Size: Large)   Pulse 90   Temp (!) 97.3 °F (36.3 °C) (Temporal)   Resp 18   Ht 5' 6\" (1.676 m)   Wt 91.2 kg (201 lb)   SpO2 97%   BMI 32.44 kg/m²     Physical Exam  Vitals reviewed.   Constitutional:       General: He is not in acute distress.     Appearance: He is well-developed. He is not ill-appearing.   HENT:      Head: Normocephalic and atraumatic.   Eyes:      General: No scleral icterus.     Conjunctiva/sclera: Conjunctivae normal.   Cardiovascular:      Rate and Rhythm: Normal rate " and regular rhythm.      Heart sounds: Normal heart sounds. No murmur heard.  Pulmonary:      Effort: Pulmonary effort is normal. No respiratory distress.      Breath sounds: Normal breath sounds.   Abdominal:      Palpations: Abdomen is soft.      Tenderness: There is no abdominal tenderness.   Musculoskeletal:         General: No tenderness. Normal range of motion.      Cervical back: Normal range of motion and neck supple.      Right lower leg: No edema.      Left lower leg: No edema.   Lymphadenopathy:      Cervical: No cervical adenopathy.   Skin:     General: Skin is warm and dry.   Neurological:      Mental Status: He is alert and oriented to person, place, and time.      Cranial Nerves: No cranial nerve deficit.   Psychiatric:         Mood and Affect: Mood normal.         Behavior: Behavior normal.       Labs:  Lab Results   Component Value Date    WBC 7.98 09/25/2024    HGB 14.7 09/25/2024    HCT 42.8 09/25/2024    MCV 86 09/25/2024    PLT 60 (L) 09/25/2024       I have spent 20 minutes with Patient  today in which greater than 50% of this time was spent in counseling/coordination of care regarding Diagnostic results, Risks and benefits of tx options, Instructions for management, Impressions, Documenting in the medical record, Reviewing / ordering tests, medicine, procedures  , and Obtaining or reviewing history  .     Patient voiced understanding and agreement in the above discussion. Aware to contact our office with questions/symptoms in the interim.     This note has been generated by voice recognition software system.  Therefore, there may be spelling, grammar, and or syntax errors. Please contact if questions arise.

## 2024-10-10 ENCOUNTER — APPOINTMENT (OUTPATIENT)
Dept: LAB | Facility: HOSPITAL | Age: 59
End: 2024-10-10
Payer: MEDICARE

## 2024-10-10 DIAGNOSIS — D69.6 THROMBOCYTOPENIA (HCC): ICD-10-CM

## 2024-10-10 LAB
BASOPHILS # BLD AUTO: 0.02 THOUSANDS/ΜL (ref 0–0.1)
BASOPHILS NFR BLD AUTO: 0 % (ref 0–1)
EOSINOPHIL # BLD AUTO: 0.16 THOUSAND/ΜL (ref 0–0.61)
EOSINOPHIL NFR BLD AUTO: 3 % (ref 0–6)
ERYTHROCYTE [DISTWIDTH] IN BLOOD BY AUTOMATED COUNT: 13.7 % (ref 11.6–15.1)
HCT VFR BLD AUTO: 42 % (ref 36.5–49.3)
HGB BLD-MCNC: 14.7 G/DL (ref 12–17)
IMM GRANULOCYTES # BLD AUTO: 0.03 THOUSAND/UL (ref 0–0.2)
IMM GRANULOCYTES NFR BLD AUTO: 1 % (ref 0–2)
LG PLATELETS BLD QL SMEAR: PRESENT
LYMPHOCYTES # BLD AUTO: 1.48 THOUSANDS/ΜL (ref 0.6–4.47)
LYMPHOCYTES NFR BLD AUTO: 26 % (ref 14–44)
MCH RBC QN AUTO: 29.6 PG (ref 26.8–34.3)
MCHC RBC AUTO-ENTMCNC: 35 G/DL (ref 31.4–37.4)
MCV RBC AUTO: 85 FL (ref 82–98)
MONOCYTES # BLD AUTO: 0.46 THOUSAND/ΜL (ref 0.17–1.22)
MONOCYTES NFR BLD AUTO: 8 % (ref 4–12)
NEUTROPHILS # BLD AUTO: 3.5 THOUSANDS/ΜL (ref 1.85–7.62)
NEUTS SEG NFR BLD AUTO: 62 % (ref 43–75)
NRBC BLD AUTO-RTO: 0 /100 WBCS
PLATELET # BLD AUTO: 66 THOUSANDS/UL (ref 149–390)
PLATELET BLD QL SMEAR: ABNORMAL
PMV BLD AUTO: 13.6 FL (ref 8.9–12.7)
RBC # BLD AUTO: 4.97 MILLION/UL (ref 3.88–5.62)
RBC MORPH BLD: NORMAL
WBC # BLD AUTO: 5.65 THOUSAND/UL (ref 4.31–10.16)

## 2024-10-10 PROCEDURE — 85025 COMPLETE CBC W/AUTO DIFF WBC: CPT

## 2024-10-10 PROCEDURE — 36415 COLL VENOUS BLD VENIPUNCTURE: CPT

## 2024-10-15 ENCOUNTER — TELEPHONE (OUTPATIENT)
Age: 59
End: 2024-10-15

## 2024-10-15 NOTE — TELEPHONE ENCOUNTER
Using  [id number] Dino, reviewed results and recommendations with patient. Patient verbalized an understanding.

## 2024-10-15 NOTE — TELEPHONE ENCOUNTER
----- Message from Lelo Westbrook PA-C sent at 10/15/2024  3:45 PM EDT -----  Platelets are 66,000.   Stable findings. No other interventions now.   Repeat CBC-D prior to Nov f/u  Patient is English speaking only, needs  to communicate.

## 2024-11-05 DIAGNOSIS — L21.9 SEBORRHEIC DERMATITIS: ICD-10-CM

## 2024-11-05 RX ORDER — KETOCONAZOLE 20 MG/ML
SHAMPOO TOPICAL
Qty: 120 ML | Refills: 1 | Status: SHIPPED | OUTPATIENT
Start: 2024-11-05

## 2024-11-11 ENCOUNTER — APPOINTMENT (OUTPATIENT)
Dept: LAB | Facility: HOSPITAL | Age: 59
End: 2024-11-11
Payer: MEDICARE

## 2024-11-11 DIAGNOSIS — R16.1 SPLENOMEGALY: ICD-10-CM

## 2024-11-11 DIAGNOSIS — D69.6 THROMBOCYTOPENIA (HCC): ICD-10-CM

## 2024-11-11 LAB
BASOPHILS # BLD AUTO: 0.03 THOUSANDS/ÂΜL (ref 0–0.1)
BASOPHILS NFR BLD AUTO: 0 % (ref 0–1)
EOSINOPHIL # BLD AUTO: 0.13 THOUSAND/ÂΜL (ref 0–0.61)
EOSINOPHIL NFR BLD AUTO: 2 % (ref 0–6)
ERYTHROCYTE [DISTWIDTH] IN BLOOD BY AUTOMATED COUNT: 13.7 % (ref 11.6–15.1)
HCT VFR BLD AUTO: 44 % (ref 36.5–49.3)
HGB BLD-MCNC: 14.7 G/DL (ref 12–17)
IMM GRANULOCYTES # BLD AUTO: 0.05 THOUSAND/UL (ref 0–0.2)
IMM GRANULOCYTES NFR BLD AUTO: 1 % (ref 0–2)
LYMPHOCYTES # BLD AUTO: 1.46 THOUSANDS/ÂΜL (ref 0.6–4.47)
LYMPHOCYTES NFR BLD AUTO: 21 % (ref 14–44)
MCH RBC QN AUTO: 29.8 PG (ref 26.8–34.3)
MCHC RBC AUTO-ENTMCNC: 33.4 G/DL (ref 31.4–37.4)
MCV RBC AUTO: 89 FL (ref 82–98)
MONOCYTES # BLD AUTO: 0.55 THOUSAND/ÂΜL (ref 0.17–1.22)
MONOCYTES NFR BLD AUTO: 8 % (ref 4–12)
NEUTROPHILS # BLD AUTO: 4.78 THOUSANDS/ÂΜL (ref 1.85–7.62)
NEUTS SEG NFR BLD AUTO: 68 % (ref 43–75)
NRBC BLD AUTO-RTO: 0 /100 WBCS
PLATELET # BLD AUTO: 94 THOUSANDS/UL (ref 149–390)
PMV BLD AUTO: 12.8 FL (ref 8.9–12.7)
RBC # BLD AUTO: 4.94 MILLION/UL (ref 3.88–5.62)
WBC # BLD AUTO: 7 THOUSAND/UL (ref 4.31–10.16)

## 2024-11-11 PROCEDURE — 36415 COLL VENOUS BLD VENIPUNCTURE: CPT

## 2024-11-11 PROCEDURE — 85025 COMPLETE CBC W/AUTO DIFF WBC: CPT

## 2024-11-13 ENCOUNTER — OFFICE VISIT (OUTPATIENT)
Dept: HEMATOLOGY ONCOLOGY | Facility: CLINIC | Age: 59
End: 2024-11-13
Payer: MEDICARE

## 2024-11-13 VITALS
HEART RATE: 94 BPM | HEIGHT: 66 IN | BODY MASS INDEX: 32.47 KG/M2 | RESPIRATION RATE: 18 BRPM | OXYGEN SATURATION: 98 % | SYSTOLIC BLOOD PRESSURE: 148 MMHG | WEIGHT: 202 LBS | TEMPERATURE: 97.6 F | DIASTOLIC BLOOD PRESSURE: 88 MMHG

## 2024-11-13 DIAGNOSIS — R16.1 SPLENOMEGALY: ICD-10-CM

## 2024-11-13 DIAGNOSIS — D69.6 THROMBOCYTOPENIA (HCC): Primary | ICD-10-CM

## 2024-11-13 PROCEDURE — 99214 OFFICE O/P EST MOD 30 MIN: CPT | Performed by: PHYSICIAN ASSISTANT

## 2024-11-13 NOTE — PROGRESS NOTES
Hematology/Oncology Outpatient Follow-up  Raulito Hallman 59 y.o. male 1965 [id number]    Date:  11/13/2024      Assessment and Plan:    1. Thrombocytopenia (HCC) (Primary) 2. Splenomegaly  History of thrombocytopenia, diagnosed consistent with ITP    He had a bone marrow biopsy previously which was negative    Patient responded with Decadron 40 mg daily for 4 days.    Platelet count now 94,000 which is very good.     Continue to observe    CBC monthly; call with any bleeding/bruising     Follow up in 4 months.     - CBC and differential; Standing    HPI:  59-year-old male presents for follow-up visit for thrombocytopenia.      Patient was sent for thrombocytopenia.  Patient history of hepatitis C in 2003/4 for which he received treatment for 1 year and then had not followed up with GI.  He has seen GI recently.  There is no mention of cirrhosis on the liver specific ultrasound they performed; no hepatomegaly.     Interval history:    ROS: Review of Systems   Constitutional:  Negative for appetite change, chills, fatigue, fever and unexpected weight change.   HENT:  Negative for mouth sores and nosebleeds.    Respiratory:  Negative for cough and shortness of breath.    Cardiovascular:  Negative for chest pain, palpitations and leg swelling.   Gastrointestinal:  Negative for abdominal pain, blood in stool, constipation, diarrhea, nausea and vomiting.   Genitourinary:  Negative for difficulty urinating, dysuria and hematuria.   Musculoskeletal:  Negative for arthralgias.   Skin: Negative.    Neurological:  Negative for dizziness, weakness, light-headedness, numbness and headaches.   Hematological: Negative.    Psychiatric/Behavioral: Negative.       Past Medical History:   Diagnosis Date    Diabetes mellitus (HCC)     Glaucoma     Hypertension        Past Surgical History:   Procedure Laterality Date    CARPAL TUNNEL RELEASE      HERNIA REPAIR      IR BIOPSY BONE MARROW  8/28/2024       Social History      Socioeconomic History    Marital status: /Civil Union     Spouse name: None    Number of children: None    Years of education: None    Highest education level: None   Occupational History    None   Tobacco Use    Smoking status: Never     Passive exposure: Never    Smokeless tobacco: Never   Vaping Use    Vaping status: Never Used   Substance and Sexual Activity    Alcohol use: Never    Drug use: Never    Sexual activity: Yes     Partners: Female   Other Topics Concern    None   Social History Narrative    None     Social Drivers of Health     Financial Resource Strain: Low Risk  (4/25/2024)    Overall Financial Resource Strain (CARDIA)     Difficulty of Paying Living Expenses: Not hard at all   Food Insecurity: No Food Insecurity (4/25/2024)    Nursing - Inadequate Food Risk Classification     Worried About Running Out of Food in the Last Year: Never true     Ran Out of Food in the Last Year: Never true     Ran Out of Food in the Last Year: Not on file   Transportation Needs: No Transportation Needs (4/25/2024)    PRAPARE - Transportation     Lack of Transportation (Medical): No     Lack of Transportation (Non-Medical): No   Physical Activity: Not on file   Stress: Not on file   Social Connections: Not on file   Intimate Partner Violence: Not on file   Housing Stability: Low Risk  (4/25/2024)    Housing Stability Vital Sign     Unable to Pay for Housing in the Last Year: No     Number of Times Moved in the Last Year: 1     Homeless in the Last Year: No       Family History   Problem Relation Age of Onset    Diabetes Father        No Known Allergies      Current Outpatient Medications:     amLODIPine-valsartan (EXFORGE) 5-320 MG per tablet, Take 1 tablet by mouth daily, Disp: 90 tablet, Rfl: 3    atorvastatin (LIPITOR) 40 mg tablet, Take 1 tablet (40 mg total) by mouth daily, Disp: 90 tablet, Rfl: 3    Blood Glucose Monitoring Suppl (OneTouch Verio) w/Device KIT, Use daily, Disp: 1 kit, Rfl: 0    Blood  "Pressure KIT, Use in the morning, Disp: 1 kit, Rfl: 0    cholecalciferol (VITAMIN D3) 1,000 units tablet, Take 1 tablet (1,000 Units total) by mouth daily, Disp: 90 tablet, Rfl: 3    Empagliflozin 25 MG TABS, Take 1 tablet (25 mg total) by mouth daily, Disp: 90 tablet, Rfl: 2    glucose blood (OneTouch Verio) test strip, USE TO CHECK BLOOD SUGAR EVERY MORNING, Disp: 100 strip, Rfl: 3    ketoconazole (NIZORAL) 2 % shampoo, APPLY 1 APPLICATION TOPICALLY TWO (TWO) TIMES A WEEK, Disp: 120 mL, Rfl: 1    Lancets (OneTouch Delica Plus [id number]) MISC, USE EVERY MORNING, Disp: 100 each, Rfl: 1    latanoprost (XALATAN) 0.005 % ophthalmic solution, Administer 1 drop to both eyes daily, Disp: 2.5 mL, Rfl: 3    metFORMIN (GLUCOPHAGE) 1000 MG tablet, Take 1 tablet (1,000 mg total) by mouth 2 (two) times a day with meals, Disp: 180 tablet, Rfl: 3    pantoprazole (PROTONIX) 20 mg tablet, Take 1 tablet (20 mg total) by mouth daily, Disp: 14 tablet, Rfl: 0      Physical Exam:  /88 (BP Location: Left arm, Patient Position: Sitting, Cuff Size: Large)   Pulse 94   Temp 97.6 °F (36.4 °C) (Temporal)   Resp 18   Ht 5' 6\" (1.676 m)   Wt 91.6 kg (202 lb)   SpO2 98%   BMI 32.60 kg/m²     Physical Exam  Vitals reviewed.   Constitutional:       General: He is not in acute distress.     Appearance: He is well-developed. He is not ill-appearing.   HENT:      Head: Normocephalic and atraumatic.   Eyes:      General: No scleral icterus.     Conjunctiva/sclera: Conjunctivae normal.   Cardiovascular:      Rate and Rhythm: Normal rate and regular rhythm.      Heart sounds: Normal heart sounds. No murmur heard.  Pulmonary:      Effort: Pulmonary effort is normal. No respiratory distress.      Breath sounds: Normal breath sounds.   Abdominal:      Palpations: Abdomen is soft.      Tenderness: There is no abdominal tenderness.   Musculoskeletal:         General: No tenderness. Normal range of motion.      Cervical back: Normal range of " motion and neck supple.      Right lower leg: No edema.      Left lower leg: No edema.   Lymphadenopathy:      Cervical: No cervical adenopathy.   Skin:     General: Skin is warm and dry.   Neurological:      Mental Status: He is alert and oriented to person, place, and time.      Cranial Nerves: No cranial nerve deficit.   Psychiatric:         Mood and Affect: Mood normal.         Behavior: Behavior normal.       Labs:  Lab Results   Component Value Date    WBC 7.00 11/11/2024    HGB 14.7 11/11/2024    HCT 44.0 11/11/2024    MCV 89 11/11/2024    PLT 94 (L) 11/11/2024       I have spent 30 minutes with Patient  today in which greater than 50% of this time was spent in counseling/coordination of care regarding Diagnostic results, Risks and benefits of tx options, Instructions for management, Patient and family education, Impressions, Documenting in the medical record, Reviewing / ordering tests, medicine, procedures  , and Obtaining or reviewing history  .     [employer]  used during entire encounter     Patient voiced understanding and agreement in the above discussion. Aware to contact our office with questions/symptoms in the interim.     This note has been generated by voice recognition software system.  Therefore, there may be spelling, grammar, and or syntax errors. Please contact if questions arise.

## 2024-12-06 ENCOUNTER — OFFICE VISIT (OUTPATIENT)
Dept: FAMILY MEDICINE CLINIC | Facility: CLINIC | Age: 59
End: 2024-12-06

## 2024-12-06 VITALS
DIASTOLIC BLOOD PRESSURE: 76 MMHG | WEIGHT: 202.1 LBS | OXYGEN SATURATION: 98 % | RESPIRATION RATE: 18 BRPM | HEIGHT: 66 IN | SYSTOLIC BLOOD PRESSURE: 126 MMHG | TEMPERATURE: 97.6 F | BODY MASS INDEX: 32.48 KG/M2 | HEART RATE: 77 BPM

## 2024-12-06 DIAGNOSIS — E11.9 TYPE 2 DIABETES MELLITUS WITHOUT COMPLICATION, WITHOUT LONG-TERM CURRENT USE OF INSULIN (HCC): Primary | ICD-10-CM

## 2024-12-06 DIAGNOSIS — I10 PRIMARY HYPERTENSION: ICD-10-CM

## 2024-12-06 DIAGNOSIS — E78.2 MIXED HYPERLIPIDEMIA: ICD-10-CM

## 2024-12-06 DIAGNOSIS — M25.551 DISCOMFORT OF RIGHT HIP: ICD-10-CM

## 2024-12-06 LAB — SL AMB POCT HEMOGLOBIN AIC: 7.7 (ref ?–6.5)

## 2024-12-06 PROCEDURE — 99214 OFFICE O/P EST MOD 30 MIN: CPT

## 2024-12-06 PROCEDURE — 83036 HEMOGLOBIN GLYCOSYLATED A1C: CPT

## 2024-12-06 NOTE — ASSESSMENT & PLAN NOTE
BP Readings from Last 3 Encounters:   12/06/24 126/76   11/13/24 148/88   10/09/24 146/90     - At goal. Continue exforge daily.

## 2024-12-06 NOTE — ASSESSMENT & PLAN NOTE
Lab Results   Component Value Date    HGBA1C 7.7 (A) 12/06/2024   Significant improvement from previous A1c of 9.8 since starting Jardiance. Continue Jardiance 25 mg & metformin 1000 mg BID.     Orders:    POCT hemoglobin A1c

## 2024-12-06 NOTE — ASSESSMENT & PLAN NOTE
Lab Results   Component Value Date    CHOLESTEROL 89 04/25/2024    CHOLESTEROL 119 10/29/2022    CHOLESTEROL 117 06/12/2021     Lab Results   Component Value Date    HDL 54 04/25/2024    HDL 39 (L) 10/29/2022    HDL 36 (L) 06/12/2021     Lab Results   Component Value Date    TRIG 74 04/25/2024    TRIG 84 10/29/2022    TRIG 99 06/12/2021     Lab Results   Component Value Date    NONHDLC 35 04/25/2024     Lab Results   Component Value Date    LDLCALC 20 04/25/2024     - At goal. Continue Atorvastatin 40 mg daily.

## 2024-12-11 ENCOUNTER — RESULTS FOLLOW-UP (OUTPATIENT)
Dept: HEMATOLOGY ONCOLOGY | Facility: CLINIC | Age: 59
End: 2024-12-11

## 2024-12-11 ENCOUNTER — APPOINTMENT (OUTPATIENT)
Dept: LAB | Facility: HOSPITAL | Age: 59
End: 2024-12-11
Payer: MEDICARE

## 2024-12-11 DIAGNOSIS — R16.1 SPLENOMEGALY: ICD-10-CM

## 2024-12-11 DIAGNOSIS — D69.6 THROMBOCYTOPENIA (HCC): ICD-10-CM

## 2024-12-11 LAB
BASOPHILS # BLD AUTO: 0.03 THOUSANDS/ÂΜL (ref 0–0.1)
BASOPHILS NFR BLD AUTO: 0 % (ref 0–1)
EOSINOPHIL # BLD AUTO: 0.16 THOUSAND/ÂΜL (ref 0–0.61)
EOSINOPHIL NFR BLD AUTO: 2 % (ref 0–6)
ERYTHROCYTE [DISTWIDTH] IN BLOOD BY AUTOMATED COUNT: 13.6 % (ref 11.6–15.1)
HCT VFR BLD AUTO: 40.3 % (ref 36.5–49.3)
HGB BLD-MCNC: 13.6 G/DL (ref 12–17)
IMM GRANULOCYTES # BLD AUTO: 0.03 THOUSAND/UL (ref 0–0.2)
IMM GRANULOCYTES NFR BLD AUTO: 0 % (ref 0–2)
LYMPHOCYTES # BLD AUTO: 1.97 THOUSANDS/ÂΜL (ref 0.6–4.47)
LYMPHOCYTES NFR BLD AUTO: 25 % (ref 14–44)
MCH RBC QN AUTO: 29.2 PG (ref 26.8–34.3)
MCHC RBC AUTO-ENTMCNC: 33.7 G/DL (ref 31.4–37.4)
MCV RBC AUTO: 87 FL (ref 82–98)
MONOCYTES # BLD AUTO: 0.76 THOUSAND/ÂΜL (ref 0.17–1.22)
MONOCYTES NFR BLD AUTO: 10 % (ref 4–12)
NEUTROPHILS # BLD AUTO: 4.85 THOUSANDS/ÂΜL (ref 1.85–7.62)
NEUTS SEG NFR BLD AUTO: 63 % (ref 43–75)
NRBC BLD AUTO-RTO: 0 /100 WBCS
PLATELET # BLD AUTO: 127 THOUSANDS/UL (ref 149–390)
PMV BLD AUTO: 12.1 FL (ref 8.9–12.7)
RBC # BLD AUTO: 4.65 MILLION/UL (ref 3.88–5.62)
WBC # BLD AUTO: 7.8 THOUSAND/UL (ref 4.31–10.16)

## 2024-12-11 PROCEDURE — 36415 COLL VENOUS BLD VENIPUNCTURE: CPT

## 2024-12-11 PROCEDURE — 85025 COMPLETE CBC W/AUTO DIFF WBC: CPT

## 2024-12-11 NOTE — TELEPHONE ENCOUNTER
Spoke with patient using  [employer] (#[id number]) to make patient aware of the following information from Jerad RIVERO. He verbalized understanding and agreed to plan.

## 2024-12-11 NOTE — TELEPHONE ENCOUNTER
----- Message from Lelo Westbrook PA-C sent at 12/11/2024  1:38 PM EST -----  Divehi speaking only.  Platelets continue to improve.   Continue to check CBC-D once a month.

## 2024-12-13 ENCOUNTER — OFFICE VISIT (OUTPATIENT)
Dept: FAMILY MEDICINE CLINIC | Facility: CLINIC | Age: 59
End: 2024-12-13

## 2024-12-13 VITALS
BODY MASS INDEX: 32.47 KG/M2 | RESPIRATION RATE: 16 BRPM | HEIGHT: 66 IN | OXYGEN SATURATION: 97 % | TEMPERATURE: 97.6 F | DIASTOLIC BLOOD PRESSURE: 78 MMHG | SYSTOLIC BLOOD PRESSURE: 124 MMHG | HEART RATE: 77 BPM | WEIGHT: 202 LBS

## 2024-12-13 DIAGNOSIS — R21 RASH: Primary | ICD-10-CM

## 2024-12-13 DIAGNOSIS — Z23 ENCOUNTER FOR IMMUNIZATION: ICD-10-CM

## 2024-12-13 PROCEDURE — 99214 OFFICE O/P EST MOD 30 MIN: CPT | Performed by: STUDENT IN AN ORGANIZED HEALTH CARE EDUCATION/TRAINING PROGRAM

## 2024-12-13 PROCEDURE — 90750 HZV VACC RECOMBINANT IM: CPT | Performed by: STUDENT IN AN ORGANIZED HEALTH CARE EDUCATION/TRAINING PROGRAM

## 2024-12-13 PROCEDURE — 90472 IMMUNIZATION ADMIN EACH ADD: CPT | Performed by: STUDENT IN AN ORGANIZED HEALTH CARE EDUCATION/TRAINING PROGRAM

## 2024-12-13 PROCEDURE — 90471 IMMUNIZATION ADMIN: CPT

## 2024-12-13 PROCEDURE — 90673 RIV3 VACCINE NO PRESERV IM: CPT

## 2024-12-13 NOTE — PATIENT INSTRUCTIONS
Use compression stockings daily and take off at night.     Elevate legs at the end of the day.   Follow up with Dr Hallman in 1 month at 3 pm to recheck symptoms.

## 2024-12-13 NOTE — LETTER
December 13, 2024     Patient: Raulito Hallman  YOB: 1965  Date of Visit: 12/13/2024      To Whom it May Concern:    Raulito Hallman is under my professional care. Raulito was seen in my office on 12/13/2024. He has a medical condition that is preventing him from standing for prolonged period time. Raulito may return to work with limitations sitting every hour for 15 minutes .    If you have any questions or concerns, please don't hesitate to call.         Sincerely,          Roselia Hallman MD     Detail Level: Zone Add High Risk Medication Management Associated Diagnosis?: No

## 2024-12-13 NOTE — PROGRESS NOTES
Name: Raulito Hallman      : 1965      MRN: [id number]  Encounter Provider: Roselia Hallman MD  Encounter Date: 2024   Encounter department: [facility] [place]  :  Assessment & Plan  Rash  Ddx includes contact dermatitis, cellulitis, erysipelas. However patient denies use of new soaps or clothes. Unlikely infectious process; no constitutional sxs.    Clinically correlated with possible stasis dermatitis secondary to venous stasis.   Reviewed use of compression stockings and elevating the legs at the end of the day.  Provide information of compression stockings in AVS.  Follow up in 1 month.       Encounter for immunization  Vaccines administered.  Patient tolerated well.  Orders:    Zoster Vaccine Recombinant IM    influenza vaccine, recombinant, PF, 0.5 mL IM (Flublok)           History of Present Illness     Raulito Hallman is a very pleasant 59 y.o. male who has a PMH of chronic leg swelling, T2DM, HLD, and HTN, and presents today with concerns of bilateral nonpainful LE swelling and redness which started suddenly 3-4 days ago.  He first noticed the rash at home and is unchanged.  Denies any new use of soaps lotions, or new clothes.  He works in a plastic factory and started about 3 weeks ago.  No known history of allergies or eczema.  Swelling has not impaired his gait.  He has not noticed any new weight gain.  Patient his wife had provided pictures of worsening swelling and redness above left ankle and which has a ankle monitor in place.      Review of Systems   Constitutional:  Negative for chills, diaphoresis, fatigue, fever and unexpected weight change.   Eyes:  Negative for visual disturbance.   Respiratory:  Negative for chest tightness and shortness of breath.    Cardiovascular:  Negative for chest pain and palpitations.   Gastrointestinal:  Negative for abdominal pain, constipation, diarrhea, nausea and vomiting.   Musculoskeletal:  Negative for arthralgias, back  "pain, joint swelling and myalgias.   Skin:  Positive for rash.   Neurological:  Negative for dizziness, weakness, light-headedness and headaches.       Objective   /78 (BP Location: Left arm, Patient Position: Sitting, Cuff Size: Large)   Pulse 77   Temp 97.6 °F (36.4 °C) (Temporal)   Resp 16   Ht 5' 6\" (1.676 m)   Wt 91.6 kg (202 lb)   SpO2 97%   BMI 32.60 kg/m²      Physical Exam  Constitutional:       General: He is not in acute distress.     Appearance: Normal appearance. He is not ill-appearing or toxic-appearing.   HENT:      Nose: Nose normal. No rhinorrhea.   Cardiovascular:      Rate and Rhythm: Regular rhythm.      Pulses: Normal pulses.      Heart sounds: Normal heart sounds.   Pulmonary:      Effort: Pulmonary effort is normal.      Breath sounds: Normal breath sounds.   Abdominal:      General: Bowel sounds are normal.      Palpations: Abdomen is soft.      Tenderness: There is no abdominal tenderness.   Musculoskeletal:         General: No tenderness.      Right lower leg: Edema present.      Left lower leg: Edema present.      Comments: +2 B/L midtibial edema   Skin:     General: Skin is warm and dry.      Capillary Refill: Capillary refill takes less than 2 seconds.      Findings: Erythema present. No abrasion, signs of injury or rash.   Neurological:      Mental Status: He is alert and oriented to person, place, and time.      Sensory: No sensory deficit.      Motor: No weakness.      Comments: No bilateral weakness or sensory deficit of lower extremities.             "

## 2024-12-13 NOTE — LETTER
December 20, 2024     Patient: Raulito Hallman  YOB: 1965  Date of Visit: 12/13/2024      To Whom it May Concern:    Raulito Hallman is under my professional care. Raulito was seen in my office on 12/13/2024. He will require the use of compression socks daily of both legs while he works and will need to remove the compression socks at the end of the day to improve his medical condition. This treatment is necessary to cease progression of his disease and prevent future complications.     The compression socks may not be worn over the ankle bracelet, as this will cause further injury.     If you have any questions or concerns, please don't hesitate to call.         Sincerely,          Roselia Hallman MD        CC: No Recipients

## 2024-12-16 ENCOUNTER — TELEPHONE (OUTPATIENT)
Dept: FAMILY MEDICINE CLINIC | Facility: CLINIC | Age: 59
End: 2024-12-16

## 2024-12-16 NOTE — TELEPHONE ENCOUNTER
Pt came into the office requesting a letter that was offered to pt in regards of the visit he had with you on 12/13. Please advice.

## 2024-12-18 NOTE — TELEPHONE ENCOUNTER
Patient called office stating that he do not need letter for work. 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 Pt stated he is unable to wear stockings as they are tight and gps is interfering. Pt stated he need this letter by tomorrow. Please advise. Thank you!

## 2025-01-08 ENCOUNTER — RESULTS FOLLOW-UP (OUTPATIENT)
Dept: HEMATOLOGY ONCOLOGY | Facility: CLINIC | Age: 60
End: 2025-01-08

## 2025-01-08 ENCOUNTER — APPOINTMENT (OUTPATIENT)
Dept: LAB | Facility: HOSPITAL | Age: 60
End: 2025-01-08
Payer: MEDICARE

## 2025-01-08 ENCOUNTER — TELEPHONE (OUTPATIENT)
Dept: HEMATOLOGY ONCOLOGY | Facility: CLINIC | Age: 60
End: 2025-01-08

## 2025-01-08 DIAGNOSIS — D69.6 THROMBOCYTOPENIA (HCC): ICD-10-CM

## 2025-01-08 DIAGNOSIS — R16.1 SPLENOMEGALY: ICD-10-CM

## 2025-01-08 LAB
BASOPHILS # BLD AUTO: 0.05 THOUSANDS/ΜL (ref 0–0.1)
BASOPHILS NFR BLD AUTO: 1 % (ref 0–1)
EOSINOPHIL # BLD AUTO: 0.15 THOUSAND/ΜL (ref 0–0.61)
EOSINOPHIL NFR BLD AUTO: 2 % (ref 0–6)
ERYTHROCYTE [DISTWIDTH] IN BLOOD BY AUTOMATED COUNT: 13.2 % (ref 11.6–15.1)
HCT VFR BLD AUTO: 43.6 % (ref 36.5–49.3)
HGB BLD-MCNC: 14.5 G/DL (ref 12–17)
IMM GRANULOCYTES # BLD AUTO: 0.04 THOUSAND/UL (ref 0–0.2)
IMM GRANULOCYTES NFR BLD AUTO: 1 % (ref 0–2)
LYMPHOCYTES # BLD AUTO: 1.9 THOUSANDS/ΜL (ref 0.6–4.47)
LYMPHOCYTES NFR BLD AUTO: 23 % (ref 14–44)
MCH RBC QN AUTO: 28.6 PG (ref 26.8–34.3)
MCHC RBC AUTO-ENTMCNC: 33.3 G/DL (ref 31.4–37.4)
MCV RBC AUTO: 86 FL (ref 82–98)
MONOCYTES # BLD AUTO: 0.87 THOUSAND/ΜL (ref 0.17–1.22)
MONOCYTES NFR BLD AUTO: 10 % (ref 4–12)
NEUTROPHILS # BLD AUTO: 5.38 THOUSANDS/ΜL (ref 1.85–7.62)
NEUTS SEG NFR BLD AUTO: 63 % (ref 43–75)
NRBC BLD AUTO-RTO: 0 /100 WBCS
PLATELET # BLD AUTO: 116 THOUSANDS/UL (ref 149–390)
PMV BLD AUTO: 11.7 FL (ref 8.9–12.7)
RBC # BLD AUTO: 5.07 MILLION/UL (ref 3.88–5.62)
WBC # BLD AUTO: 8.39 THOUSAND/UL (ref 4.31–10.16)

## 2025-01-08 PROCEDURE — 85025 COMPLETE CBC W/AUTO DIFF WBC: CPT

## 2025-01-08 PROCEDURE — 36415 COLL VENOUS BLD VENIPUNCTURE: CPT

## 2025-01-08 NOTE — TELEPHONE ENCOUNTER
Call completed with assistance of [employer]  # [phone]    Advised patient  Jerad Westbrook's recommendation as noted below.  Patient verbalized understanding.       Lelo Westbrook PA-C to Me        1/8/25  2:00 PM  Result Note  Platelets remain good, 116, 000 Repeat CBC just prior to visit in March

## 2025-01-22 DIAGNOSIS — L21.9 SEBORRHEIC DERMATITIS: ICD-10-CM

## 2025-01-22 RX ORDER — KETOCONAZOLE 20 MG/ML
SHAMPOO, SUSPENSION TOPICAL
Qty: 120 ML | Refills: 0 | Status: SHIPPED | OUTPATIENT
Start: 2025-01-22

## 2025-01-24 ENCOUNTER — OFFICE VISIT (OUTPATIENT)
Dept: FAMILY MEDICINE CLINIC | Facility: CLINIC | Age: 60
End: 2025-01-24

## 2025-01-24 VITALS
BODY MASS INDEX: 31.57 KG/M2 | HEART RATE: 71 BPM | RESPIRATION RATE: 18 BRPM | OXYGEN SATURATION: 98 % | DIASTOLIC BLOOD PRESSURE: 72 MMHG | HEIGHT: 66 IN | SYSTOLIC BLOOD PRESSURE: 124 MMHG | TEMPERATURE: 97.2 F | WEIGHT: 196.4 LBS

## 2025-01-24 DIAGNOSIS — E11.9 TYPE 2 DIABETES MELLITUS WITHOUT COMPLICATION, WITHOUT LONG-TERM CURRENT USE OF INSULIN (HCC): ICD-10-CM

## 2025-01-24 DIAGNOSIS — I87.2 STASIS DERMATITIS OF BOTH LEGS: Primary | ICD-10-CM

## 2025-01-24 PROCEDURE — 99213 OFFICE O/P EST LOW 20 MIN: CPT | Performed by: FAMILY MEDICINE

## 2025-01-24 NOTE — LETTER
January 26, 2025     Patient: Raulito Hallman  YOB: 1965  Date of Visit: 1/24/2025      To Whom it May Concern:    Raulito Hallman is under my professional care. Raulito was seen in my office on 1/24/2025. Raulito may return to work without limitations on 1/27/25.    If you have any questions or concerns, please don't hesitate to call.         Sincerely,          Roselia Hallman MD        CC: No Recipients

## 2025-01-24 NOTE — ASSESSMENT & PLAN NOTE
Lab Results   Component Value Date    HGBA1C 7.7 (A) 2024     Fasting B-129  Home regimen: Jardiance 25 mg daily.  DFE today wnl.  A:C ratio 24: 24.  Continue Jardiance.  Refrain from consuming processed sugars.  Encouraged consumption of whole foods and having small portions.  Repeat A1c 2025.    Orders:    Diabetic foot exam; Future    Ambulatory Referral to Podiatry; Future

## 2025-01-24 NOTE — PROGRESS NOTES
"Name: Raulito Hallman      : 1965      MRN: [id number]  Encounter Provider: Roselia Hallman MD  Encounter Date: 2025   Encounter department: [facility] [place]  :  Assessment & Plan  Stasis dermatitis of both legs  D/t CVI.  Resolved with use of compression socks and loosening of ankle monitor.   Work note to be collected on Monday.  Type 2 diabetes mellitus without complication, without long-term current use of insulin ([facility])    Lab Results   Component Value Date    HGBA1C 7.7 (A) 2024     Fasting B-129  Home regimen: Jardiance 25 mg daily.  DFE today wnl.  A:C ratio 24: 24.  Continue Jardiance.  Refrain from consuming processed sugars.  Encouraged consumption of whole foods and having small portions.  Repeat A1c 2025.    Orders:    Diabetic foot exam; Future    Ambulatory Referral to Podiatry; Future           History of Present Illness   Raulito Hallman is a pleasant 59 y.o. male with a history of HTN, T2DM, HLD presents today for follow-up of lower extremity edema.      Review of Systems   Constitutional:  Negative for chills and fatigue.   HENT:  Negative for sore throat and trouble swallowing.    Eyes:  Negative for visual disturbance.   Respiratory:  Negative for cough, chest tightness and shortness of breath.    Cardiovascular:  Negative for chest pain and palpitations.   Gastrointestinal:  Negative for abdominal pain.   Musculoskeletal:  Negative for arthralgias, joint swelling and myalgias.   Skin:  Negative for color change and rash.   Neurological:  Negative for dizziness, weakness, light-headedness and headaches.       Objective   /72 (BP Location: Right arm, Patient Position: Sitting, Cuff Size: Large)   Pulse 71   Temp (!) 97.2 °F (36.2 °C) (Temporal)   Resp 18   Ht 5' 6\" (1.676 m)   Wt 89.1 kg (196 lb 6.4 oz)   SpO2 98%   BMI 31.70 kg/m²      Physical Exam  Vitals reviewed.   Constitutional:       Appearance: Normal " appearance.   HENT:      Nose: Nose normal.      Mouth/Throat:      Mouth: Mucous membranes are moist.   Eyes:      Extraocular Movements: Extraocular movements intact.   Cardiovascular:      Rate and Rhythm: Normal rate and regular rhythm.      Pulses: Normal pulses. no weak pulses.           Dorsalis pedis pulses are 2+ on the right side and 2+ on the left side.        Posterior tibial pulses are 2+ on the right side and 2+ on the left side.      Heart sounds: Normal heart sounds.   Pulmonary:      Effort: Pulmonary effort is normal.   Abdominal:      General: Bowel sounds are normal. There is no distension.      Palpations: Abdomen is soft.      Tenderness: There is no abdominal tenderness.   Musculoskeletal:         General: No tenderness. Normal range of motion.      Right lower leg: No edema.      Left lower leg: No edema.   Feet:      Right foot:      Skin integrity: Dry skin present. No ulcer, skin breakdown, erythema, warmth or callus.      Left foot:      Skin integrity: Dry skin present. No ulcer, skin breakdown, erythema, warmth or callus.   Skin:     General: Skin is warm.      Capillary Refill: Capillary refill takes less than 2 seconds.      Findings: No bruising or erythema.   Neurological:      Mental Status: He is alert and oriented to person, place, and time.       Diabetic Foot Exam    Patient's shoes and socks removed.    Right Foot/Ankle   Right Foot Inspection  Skin Exam: skin normal, skin intact and dry skin. No warmth, no callus, no erythema, no maceration, no abnormal color, no pre-ulcer, no ulcer and no callus.     Toe Exam: ROM and strength within normal limits.     Sensory   Proprioception: intact  Monofilament testing: intact    Vascular  Capillary refills: < 3 seconds  The right DP pulse is 2+. The right PT pulse is 2+.     Left Foot/Ankle  Left Foot Inspection  Skin Exam: skin normal, skin intact and dry skin. No warmth, no erythema, no maceration, normal color, no pre-ulcer, no ulcer  and no callus.     Toe Exam: ROM and strength within normal limits.     Sensory   Proprioception: intact  Monofilament testing: intact    Vascular  Capillary refills: < 3 seconds  The left DP pulse is 2+. The left PT pulse is 2+.     Assign Risk Category  No deformity present  No loss of protective sensation  No weak pulses  Risk: 0

## 2025-01-29 ENCOUNTER — TELEPHONE (OUTPATIENT)
Dept: FAMILY MEDICINE CLINIC | Facility: CLINIC | Age: 60
End: 2025-01-29

## 2025-01-29 NOTE — TELEPHONE ENCOUNTER
Patient notified form is ready for . Form scanned into patient chart  Form placed in  bin under letter M       posterior

## 2025-02-11 ENCOUNTER — APPOINTMENT (OUTPATIENT)
Dept: LAB | Facility: HOSPITAL | Age: 60
End: 2025-02-11
Payer: COMMERCIAL

## 2025-02-11 DIAGNOSIS — R16.1 SPLENOMEGALY: ICD-10-CM

## 2025-02-11 DIAGNOSIS — D69.6 THROMBOCYTOPENIA (HCC): ICD-10-CM

## 2025-02-11 LAB
BASOPHILS # BLD AUTO: 0.05 THOUSANDS/ΜL (ref 0–0.1)
BASOPHILS NFR BLD AUTO: 1 % (ref 0–1)
EOSINOPHIL # BLD AUTO: 0.14 THOUSAND/ΜL (ref 0–0.61)
EOSINOPHIL NFR BLD AUTO: 2 % (ref 0–6)
ERYTHROCYTE [DISTWIDTH] IN BLOOD BY AUTOMATED COUNT: 13.2 % (ref 11.6–15.1)
HCT VFR BLD AUTO: 41.8 % (ref 36.5–49.3)
HGB BLD-MCNC: 13.7 G/DL (ref 12–17)
IMM GRANULOCYTES # BLD AUTO: 0.03 THOUSAND/UL (ref 0–0.2)
IMM GRANULOCYTES NFR BLD AUTO: 0 % (ref 0–2)
LYMPHOCYTES # BLD AUTO: 1.88 THOUSANDS/ΜL (ref 0.6–4.47)
LYMPHOCYTES NFR BLD AUTO: 21 % (ref 14–44)
MCH RBC QN AUTO: 28.1 PG (ref 26.8–34.3)
MCHC RBC AUTO-ENTMCNC: 32.8 G/DL (ref 31.4–37.4)
MCV RBC AUTO: 86 FL (ref 82–98)
MONOCYTES # BLD AUTO: 0.55 THOUSAND/ΜL (ref 0.17–1.22)
MONOCYTES NFR BLD AUTO: 6 % (ref 4–12)
NEUTROPHILS # BLD AUTO: 6.28 THOUSANDS/ΜL (ref 1.85–7.62)
NEUTS SEG NFR BLD AUTO: 70 % (ref 43–75)
NRBC BLD AUTO-RTO: 0 /100 WBCS
PLATELET # BLD AUTO: 141 THOUSANDS/UL (ref 149–390)
PMV BLD AUTO: 11.3 FL (ref 8.9–12.7)
RBC # BLD AUTO: 4.87 MILLION/UL (ref 3.88–5.62)
WBC # BLD AUTO: 8.93 THOUSAND/UL (ref 4.31–10.16)

## 2025-02-11 PROCEDURE — 36415 COLL VENOUS BLD VENIPUNCTURE: CPT

## 2025-02-11 PROCEDURE — 85025 COMPLETE CBC W/AUTO DIFF WBC: CPT

## 2025-02-21 ENCOUNTER — RA CDI HCC (OUTPATIENT)
Dept: OTHER | Facility: HOSPITAL | Age: 60
End: 2025-02-21

## 2025-02-21 NOTE — PROGRESS NOTES
HCC coding opportunities          Chart Reviewed number of suggestions sent to Provider: 1   E11.65  This is a reminder to address (resolve/update/assess) ALL HCC (risk adjustment) codes as found on active problem list for 2025 as patient scores reset to zero NAIF.    Patients Insurance        Commercial Insurance: [employer]

## 2025-02-24 DIAGNOSIS — L21.9 SEBORRHEIC DERMATITIS: ICD-10-CM

## 2025-02-26 RX ORDER — KETOCONAZOLE 20 MG/ML
SHAMPOO, SUSPENSION TOPICAL
Qty: 120 ML | Refills: 2 | OUTPATIENT
Start: 2025-02-26

## 2025-02-27 ENCOUNTER — TELEPHONE (OUTPATIENT)
Dept: FAMILY MEDICINE CLINIC | Facility: CLINIC | Age: 60
End: 2025-02-27

## 2025-02-28 ENCOUNTER — TELEPHONE (OUTPATIENT)
Dept: FAMILY MEDICINE CLINIC | Facility: CLINIC | Age: 60
End: 2025-02-28

## 2025-02-28 NOTE — TELEPHONE ENCOUNTER
Patient called office stating that Jardiance needs prior authorization. I do not see medication on pt list. Please advise. Thank you!

## 2025-03-03 DIAGNOSIS — E11.9 TYPE 2 DIABETES MELLITUS WITHOUT COMPLICATION, WITHOUT LONG-TERM CURRENT USE OF INSULIN (HCC): ICD-10-CM

## 2025-03-11 ENCOUNTER — TELEPHONE (OUTPATIENT)
Dept: FAMILY MEDICINE CLINIC | Facility: CLINIC | Age: 60
End: 2025-03-11

## 2025-03-17 ENCOUNTER — OFFICE VISIT (OUTPATIENT)
Dept: FAMILY MEDICINE CLINIC | Facility: CLINIC | Age: 60
End: 2025-03-17

## 2025-03-17 VITALS
HEIGHT: 66 IN | RESPIRATION RATE: 18 BRPM | DIASTOLIC BLOOD PRESSURE: 76 MMHG | SYSTOLIC BLOOD PRESSURE: 116 MMHG | BODY MASS INDEX: 30.94 KG/M2 | TEMPERATURE: 97.7 F | OXYGEN SATURATION: 97 % | WEIGHT: 192.5 LBS | HEART RATE: 80 BPM

## 2025-03-17 DIAGNOSIS — I10 PRIMARY HYPERTENSION: ICD-10-CM

## 2025-03-17 DIAGNOSIS — E78.49 OTHER HYPERLIPIDEMIA: ICD-10-CM

## 2025-03-17 DIAGNOSIS — L21.9 SEBORRHEIC DERMATITIS: ICD-10-CM

## 2025-03-17 DIAGNOSIS — E11.9 TYPE 2 DIABETES MELLITUS WITHOUT COMPLICATION, WITHOUT LONG-TERM CURRENT USE OF INSULIN (HCC): Primary | ICD-10-CM

## 2025-03-17 LAB — SL AMB POCT HEMOGLOBIN AIC: 7.1 (ref ?–6.5)

## 2025-03-17 PROCEDURE — 99214 OFFICE O/P EST MOD 30 MIN: CPT

## 2025-03-17 PROCEDURE — 83036 HEMOGLOBIN GLYCOSYLATED A1C: CPT

## 2025-03-17 RX ORDER — ATORVASTATIN CALCIUM 40 MG/1
40 TABLET, FILM COATED ORAL DAILY
Qty: 90 TABLET | Refills: 3 | Status: SHIPPED | OUTPATIENT
Start: 2025-03-17

## 2025-03-17 RX ORDER — KETOCONAZOLE 20 MG/ML
1 SHAMPOO, SUSPENSION TOPICAL 2 TIMES WEEKLY
Qty: 120 ML | Refills: 0 | Status: SHIPPED | OUTPATIENT
Start: 2025-03-17

## 2025-03-17 RX ORDER — AMLODIPINE AND VALSARTAN 5; 320 MG/1; MG/1
1 TABLET ORAL DAILY
Qty: 90 TABLET | Refills: 3 | Status: SHIPPED | OUTPATIENT
Start: 2025-03-17

## 2025-03-17 NOTE — ASSESSMENT & PLAN NOTE
Orders:    ketoconazole (NIZORAL) 2 % shampoo; Apply 1 Application topically 2 (two) times a week

## 2025-03-17 NOTE — ASSESSMENT & PLAN NOTE
Lab Results   Component Value Date    HGBA1C 7.1 (A) 03/17/2025   At goal. Continue metformin 1000 mg daily & Jardiance  25 mg daily    Orders:    POCT hemoglobin A1c    Empagliflozin 25 MG TABS; Take 1 tablet (25 mg total) by mouth daily    metFORMIN (GLUCOPHAGE) 1000 MG tablet; Take 1 tablet (1,000 mg total) by mouth 2 (two) times a day with meals

## 2025-03-17 NOTE — ASSESSMENT & PLAN NOTE
BP Readings from Last 3 Encounters:   03/17/25 116/76   01/24/25 124/72   12/13/24 124/78     At goal. Continue Exforge daily.   Orders:    amLODIPine-valsartan (EXFORGE) 5-320 MG per tablet; Take 1 tablet by mouth daily

## 2025-03-17 NOTE — PROGRESS NOTES
Name: Raulito Hallman      : 1965      MRN: [id number]  Encounter Provider: HARISH Noble  Encounter Date: 3/17/2025   Encounter department: [facility]  :  Assessment & Plan  Type 2 diabetes mellitus without complication, without long-term current use of insulin (HCC)    Lab Results   Component Value Date    HGBA1C 7.1 (A) 2025   At goal. Continue metformin 1000 mg daily & Jardiance  25 mg daily    Orders:    POCT hemoglobin A1c    Empagliflozin 25 MG TABS; Take 1 tablet (25 mg total) by mouth daily    metFORMIN (GLUCOPHAGE) 1000 MG tablet; Take 1 tablet (1,000 mg total) by mouth 2 (two) times a day with meals    Other hyperlipidemia  Lab Results   Component Value Date    CHOLESTEROL 89 2024    CHOLESTEROL 119 10/29/2022    CHOLESTEROL 117 2021     Lab Results   Component Value Date    HDL 54 2024    HDL 39 (L) 10/29/2022    HDL 36 (L) 2021     Lab Results   Component Value Date    TRIG 74 2024    TRIG 84 10/29/2022    TRIG 99 2021     Lab Results   Component Value Date    NONHDLC 35 2024     Lab Results   Component Value Date    LDLCALC 20 2024     At goal. Continue Lipitor 40 mg daily.   Orders:    atorvastatin (LIPITOR) 40 mg tablet; Take 1 tablet (40 mg total) by mouth daily    Primary hypertension  BP Readings from Last 3 Encounters:   25 116/76   25 124/72   24 124/78     At goal. Continue Exforge daily.   Orders:    amLODIPine-valsartan (EXFORGE) 5-320 MG per tablet; Take 1 tablet by mouth daily    Seborrheic dermatitis    Orders:    ketoconazole (NIZORAL) 2 % shampoo; Apply 1 Application topically 2 (two) times a week           History of Present Illness   Raulito Hallman is a 59 y.o. male  has a past medical history of Diabetes mellitus (HCC), Glaucoma, and Hypertension.  has a past surgical history that includes Hernia repair; Carpal tunnel release; and IR biopsy bone marrow  "(8/28/2024).    Patient is a 59 year old male presenting for diabetes and requesting accomodation for sleeping on lower bed due to experiencing cramps. He needs to get off bed and stretch his legs. He reports that he is going away to [place] for about 3 months. Patient states his blood glucose 100-120, and patient has lost 10 lb since December due to dieting and exercising.      Review of Systems   Constitutional:  Negative for chills and fever.   HENT:  Negative for ear pain and sore throat.    Eyes:  Negative for pain and visual disturbance.   Respiratory:  Negative for cough.    Gastrointestinal:  Negative for abdominal pain and vomiting.   Genitourinary:  Negative for dysuria and hematuria.   Musculoskeletal:  Negative for arthralgias and back pain.   Skin:  Negative for color change and rash.   Neurological:  Negative for seizures and syncope.   All other systems reviewed and are negative.      Objective   /76 (BP Location: Left arm, Patient Position: Sitting, Cuff Size: Standard)   Pulse 80   Temp 97.7 °F (36.5 °C) (Temporal)   Resp 18   Ht 5' 6\" (1.676 m)   Wt 87.3 kg (192 lb 8 oz)   SpO2 97%   BMI 31.07 kg/m²      Physical Exam  Vitals and nursing note reviewed.   Constitutional:       General: He is not in acute distress.     Appearance: He is well-developed. He is obese.   HENT:      Head: Normocephalic and atraumatic.      Right Ear: External ear normal.      Left Ear: External ear normal.      Nose: Nose normal.   Eyes:      Conjunctiva/sclera: Conjunctivae normal.   Cardiovascular:      Rate and Rhythm: Normal rate and regular rhythm.      Pulses: Normal pulses.      Heart sounds: Normal heart sounds. No murmur heard.  Pulmonary:      Effort: Pulmonary effort is normal. No respiratory distress.      Breath sounds: Normal breath sounds.   Abdominal:      Palpations: Abdomen is soft.      Tenderness: There is no abdominal tenderness.   Musculoskeletal:         General: No swelling. Normal " range of motion.      Cervical back: Normal range of motion and neck supple.   Skin:     General: Skin is warm and dry.      Capillary Refill: Capillary refill takes less than 2 seconds.   Neurological:      General: No focal deficit present.      Mental Status: He is alert and oriented to person, place, and time. Mental status is at baseline.   Psychiatric:         Mood and Affect: Mood normal.         Behavior: Behavior normal.         Thought Content: Thought content normal.         Judgment: Judgment normal.

## 2025-03-17 NOTE — LETTER
March 17, 2025     Patient: Raulito Hallman  YOB: 1965  Date of Visit: 3/17/2025      To Whom it May Concern:    Raulito Hallman is under my professional care. Raulito was seen in my office on 3/17/2025. Raulito needs to have the lower bunk bed due to muscle spasms.    If you have any questions or concerns, please don't hesitate to call.         Sincerely,          HARISH Noble        CC: No Recipients

## 2025-03-19 ENCOUNTER — TELEPHONE (OUTPATIENT)
Dept: HEMATOLOGY ONCOLOGY | Facility: CLINIC | Age: 60
End: 2025-03-19

## 2025-03-19 NOTE — TELEPHONE ENCOUNTER
Called pt through  regarding missed follow up appt today.  Informed that pt is currently out of the country and will call to reschedule when he is back.      Please call [employer] phone# [phone] to reschedule

## 2025-03-24 DIAGNOSIS — E78.49 OTHER HYPERLIPIDEMIA: ICD-10-CM

## 2025-03-24 DIAGNOSIS — I10 PRIMARY HYPERTENSION: ICD-10-CM

## 2025-03-24 RX ORDER — AMLODIPINE AND VALSARTAN 5; 320 MG/1; MG/1
TABLET ORAL
Qty: 90 TABLET | Refills: 10 | OUTPATIENT
Start: 2025-03-24

## 2025-03-24 RX ORDER — ATORVASTATIN CALCIUM 40 MG/1
40 TABLET, FILM COATED ORAL DAILY
Qty: 90 TABLET | Refills: 10 | OUTPATIENT
Start: 2025-03-24